# Patient Record
Sex: FEMALE | Race: WHITE | Employment: FULL TIME | URBAN - NONMETROPOLITAN AREA
[De-identification: names, ages, dates, MRNs, and addresses within clinical notes are randomized per-mention and may not be internally consistent; named-entity substitution may affect disease eponyms.]

---

## 2017-04-22 ENCOUNTER — HISTORY (OUTPATIENT)
Dept: EMERGENCY MEDICINE | Facility: OTHER | Age: 47
End: 2017-04-22

## 2017-05-25 ENCOUNTER — OFFICE VISIT - GICH (OUTPATIENT)
Dept: OBGYN | Facility: OTHER | Age: 47
End: 2017-05-25

## 2017-05-25 ENCOUNTER — HISTORY (OUTPATIENT)
Dept: OBGYN | Facility: OTHER | Age: 47
End: 2017-05-25

## 2017-05-25 DIAGNOSIS — R10.2 PELVIC AND PERINEAL PAIN: ICD-10-CM

## 2017-05-25 DIAGNOSIS — N92.0 EXCESSIVE AND FREQUENT MENSTRUATION WITH REGULAR CYCLE: ICD-10-CM

## 2017-05-25 LAB
PROLACTIN - HISTORICAL: 12.19 NG/ML
T3,FREE - HISTORICAL: 3.37 PG/ML (ref 2.5–3.9)
T4 FREE SERPL-MCNC: 1.04 NG/DL (ref 0.58–1.64)
TSH - HISTORICAL: 1.56 UIU/ML (ref 0.34–5.6)

## 2017-06-13 ENCOUNTER — AMBULATORY - GICH (OUTPATIENT)
Dept: FAMILY MEDICINE | Facility: OTHER | Age: 47
End: 2017-06-13

## 2017-06-13 ENCOUNTER — HISTORY (OUTPATIENT)
Dept: FAMILY MEDICINE | Facility: OTHER | Age: 47
End: 2017-06-13

## 2017-06-13 DIAGNOSIS — Z23 ENCOUNTER FOR IMMUNIZATION: ICD-10-CM

## 2017-06-13 LAB
ABSOLUTE BASOPHILS - HISTORICAL: 0 THOU/CU MM
ABSOLUTE EOSINOPHILS - HISTORICAL: 0.2 THOU/CU MM
ABSOLUTE LYMPHOCYTES - HISTORICAL: 1.8 THOU/CU MM (ref 0.9–2.9)
ABSOLUTE MONOCYTES - HISTORICAL: 0.5 THOU/CU MM
ABSOLUTE NEUTROPHILS - HISTORICAL: 4 THOU/CU MM (ref 1.7–7)
BASOPHILS # BLD AUTO: 0.6 %
EOSINOPHIL NFR BLD AUTO: 2.6 %
ERYTHROCYTE [DISTWIDTH] IN BLOOD BY AUTOMATED COUNT: 13.7 % (ref 11.5–15.5)
HCT VFR BLD AUTO: 39.3 % (ref 33–51)
HEMOGLOBIN: 12.9 G/DL (ref 12–16)
LYMPHOCYTES NFR BLD AUTO: 27.7 % (ref 20–44)
MCH RBC QN AUTO: 30 PG (ref 26–34)
MCHC RBC AUTO-ENTMCNC: 32.8 G/DL (ref 32–36)
MCV RBC AUTO: 91 FL (ref 80–100)
MONOCYTES NFR BLD AUTO: 8.1 %
NEUTROPHILS NFR BLD AUTO: 60.8 % (ref 42–72)
PLATELET # BLD AUTO: 189 THOU/CU MM (ref 140–440)
PMV BLD: 11.2 FL (ref 6.5–11)
RED BLOOD COUNT - HISTORICAL: 4.3 MIL/CU MM (ref 4–5.2)
WHITE BLOOD COUNT - HISTORICAL: 6.6 THOU/CU MM (ref 4.5–11)

## 2017-06-14 ENCOUNTER — COMMUNICATION - GICH (OUTPATIENT)
Dept: OBGYN | Facility: OTHER | Age: 47
End: 2017-06-14

## 2017-06-26 ENCOUNTER — COMMUNICATION - GICH (OUTPATIENT)
Dept: OBGYN | Facility: OTHER | Age: 47
End: 2017-06-26

## 2017-06-28 ENCOUNTER — SURGERY (OUTPATIENT)
Dept: SURGERY | Facility: OTHER | Age: 47
End: 2017-06-28

## 2017-06-28 ENCOUNTER — HOSPITAL ENCOUNTER (OUTPATIENT)
Dept: MEDSURG UNIT | Facility: OTHER | Age: 47
Discharge: HOME | End: 2017-06-29
Attending: OBSTETRICS & GYNECOLOGY | Admitting: OBSTETRICS & GYNECOLOGY

## 2017-06-28 ENCOUNTER — HISTORY (OUTPATIENT)
Dept: SURGERY | Facility: OTHER | Age: 47
End: 2017-06-28

## 2017-06-28 DIAGNOSIS — N80.9 ENDOMETRIOSIS: ICD-10-CM

## 2017-06-28 DIAGNOSIS — N92.0 EXCESSIVE AND FREQUENT MENSTRUATION WITH REGULAR CYCLE: ICD-10-CM

## 2017-06-28 LAB
ABORH - HISTORICAL: NORMAL
ANTIBODY SCREEN - HISTORICAL: NEGATIVE
HCG UR QL: NEGATIVE
HEMOGLOBIN: 13.3 G/DL (ref 12–16)
MCV RBC AUTO: 91 FL (ref 80–100)
SPECIMEN EXPIRATION DATE/TIME - HISTORICAL: NORMAL

## 2017-06-29 LAB
CREAT SERPL-MCNC: 0.75 MG/DL (ref 0.7–1.3)
GFR IF NOT AFRICAN AMERICAN - HISTORICAL: >60 ML/MIN/1.73M2
HEMOGLOBIN: 11.6 G/DL (ref 12–16)
MCV RBC AUTO: 89 FL (ref 80–100)

## 2017-06-30 ENCOUNTER — COMMUNICATION - GICH (OUTPATIENT)
Dept: OBGYN | Facility: OTHER | Age: 47
End: 2017-06-30

## 2017-07-05 ENCOUNTER — COMMUNICATION - GICH (OUTPATIENT)
Dept: OBGYN | Facility: OTHER | Age: 47
End: 2017-07-05

## 2017-07-07 ENCOUNTER — COMMUNICATION - GICH (OUTPATIENT)
Dept: OBGYN | Facility: OTHER | Age: 47
End: 2017-07-07

## 2017-07-07 ENCOUNTER — AMBULATORY - GICH (OUTPATIENT)
Dept: FAMILY MEDICINE | Facility: OTHER | Age: 47
End: 2017-07-07

## 2017-07-07 ENCOUNTER — HISTORY (OUTPATIENT)
Dept: EMERGENCY MEDICINE | Facility: OTHER | Age: 47
End: 2017-07-07

## 2017-07-07 DIAGNOSIS — K52.9 NONINFECTIVE GASTROENTERITIS AND COLITIS: ICD-10-CM

## 2017-07-11 ENCOUNTER — HISTORY (OUTPATIENT)
Dept: RADIOLOGY | Facility: OTHER | Age: 47
End: 2017-07-11

## 2017-07-11 ENCOUNTER — HISTORY (OUTPATIENT)
Dept: OBGYN | Facility: OTHER | Age: 47
End: 2017-07-11

## 2017-07-11 ENCOUNTER — OFFICE VISIT - GICH (OUTPATIENT)
Dept: OBGYN | Facility: OTHER | Age: 47
End: 2017-07-11

## 2017-07-11 ENCOUNTER — HOSPITAL ENCOUNTER (OUTPATIENT)
Dept: RADIOLOGY | Facility: OTHER | Age: 47
End: 2017-07-11
Attending: FAMILY MEDICINE

## 2017-07-11 DIAGNOSIS — A09 INFECTIOUS GASTROENTERITIS AND COLITIS: ICD-10-CM

## 2017-07-11 DIAGNOSIS — K52.9 NONINFECTIVE GASTROENTERITIS AND COLITIS: ICD-10-CM

## 2017-07-11 DIAGNOSIS — Z23 ENCOUNTER FOR IMMUNIZATION: ICD-10-CM

## 2017-07-11 LAB
ABSOLUTE BASOPHILS - HISTORICAL: 0.1 THOU/CU MM
ABSOLUTE EOSINOPHILS - HISTORICAL: 0.5 THOU/CU MM
ABSOLUTE IMMATURE GRANULOCYTES(METAS,MYELOS,PROS) - HISTORICAL: 0 THOU/CU MM
ABSOLUTE LYMPHOCYTES - HISTORICAL: 1.9 THOU/CU MM (ref 0.9–2.9)
ABSOLUTE MONOCYTES - HISTORICAL: 0.5 THOU/CU MM
ABSOLUTE NEUTROPHILS - HISTORICAL: 4.3 THOU/CU MM (ref 1.7–7)
BASOPHILS # BLD AUTO: 1.2 %
CAMPYLOBACTER EIA - HISTORICAL: NEGATIVE
EOSINOPHIL NFR BLD AUTO: 6.7 %
ERYTHROCYTE [DISTWIDTH] IN BLOOD BY AUTOMATED COUNT: 13.8 % (ref 11.5–15.5)
HCT VFR BLD AUTO: 38.9 % (ref 33–51)
HEMOGLOBIN: 13.1 G/DL (ref 12–16)
IMMATURE GRANULOCYTES(METAS,MYELOS,PROS) - HISTORICAL: 0.1 %
LYMPHOCYTES NFR BLD AUTO: 26.4 % (ref 20–44)
MCH RBC QN AUTO: 30 PG (ref 26–34)
MCHC RBC AUTO-ENTMCNC: 33.7 G/DL (ref 32–36)
MCV RBC AUTO: 89 FL (ref 80–100)
MONOCYTES NFR BLD AUTO: 6.8 %
NEUTROPHILS NFR BLD AUTO: 58.8 % (ref 42–72)
PLATELET # BLD AUTO: 266 THOU/CU MM (ref 140–440)
PMV BLD: 10.4 FL (ref 6.5–11)
RED BLOOD COUNT - HISTORICAL: 4.37 MIL/CU MM (ref 4–5.2)
SHIGA TOXIN 1 - HISTORICAL: NEGATIVE
SHIGA TOXIN 2 - HISTORICAL: NEGATIVE
WHITE BLOOD COUNT - HISTORICAL: 7.3 THOU/CU MM (ref 4.5–11)

## 2017-07-13 LAB
CULTURE - HISTORICAL: NORMAL
METHOD O&P - HISTORICAL: NORMAL
OVA/PARASITE EXAM - HISTORICAL: NORMAL

## 2017-07-18 ENCOUNTER — OFFICE VISIT - GICH (OUTPATIENT)
Dept: OBGYN | Facility: OTHER | Age: 47
End: 2017-07-18

## 2017-07-18 DIAGNOSIS — Z09 ENCOUNTER FOR FOLLOW-UP EXAMINATION AFTER COMPLETED TREATMENT FOR CONDITIONS OTHER THAN MALIGNANT NEOPLASM: ICD-10-CM

## 2017-08-08 ENCOUNTER — OFFICE VISIT - GICH (OUTPATIENT)
Dept: OBGYN | Facility: OTHER | Age: 47
End: 2017-08-08

## 2017-08-08 ENCOUNTER — HISTORY (OUTPATIENT)
Dept: OBGYN | Facility: OTHER | Age: 47
End: 2017-08-08

## 2017-08-08 DIAGNOSIS — Z09 ENCOUNTER FOR FOLLOW-UP EXAMINATION AFTER COMPLETED TREATMENT FOR CONDITIONS OTHER THAN MALIGNANT NEOPLASM: ICD-10-CM

## 2017-09-18 ENCOUNTER — COMMUNICATION - GICH (OUTPATIENT)
Dept: OBGYN | Facility: OTHER | Age: 47
End: 2017-09-18

## 2017-12-27 NOTE — PROGRESS NOTES
Patient Information     Patient Name MRN Kerrie Barrett [id number] Female 1970      Progress Notes by Mickie Martinez RT at 2017  7:44 AM     Author:  Mickie Martinez RT Service:  (none) Author Type:  RT- Respiratory Therapist     Filed:  2017  7:44 AM Date of Service:  2017  7:44 AM Status:  Signed     :  Mickie Martinez RT (RT- Respiratory Therapist)            Kerrie Patel WAS INSTRUCTED ON THE USE OF IS TODAY.  PATIENT ACHIEVED 1250 MLS OUT OF THE PREDICTED 1750 MLS BASED ON AGE AND HEIGHT.  PATIENT WILL USE IS, 10 BREATHS EVERY HOUR WHILE AWAKE FOLLOWED BY A STRONG COUGH TO KEEP LUNGS OPEN AND CLEAR WHILE HERE IN THE HOSPITAL.  PATIENT WAS ALSO INSTRUCTED TO SPLINT THEIR INCISION IF INDICATED.  PATIENT WILL CONTINUE TO USE IS UNTIL ABLE TO RESUME NORMAL DAILY ACTIVITIES.

## 2017-12-27 NOTE — PROGRESS NOTES
Patient Information     Patient Name MRN Sex Kerrie Sanderson [id number] Female 1970      Progress Notes by Loretta Childs RN at 2017 10:00 AM     Author:  Loretta Childs RN Service:  (none) Author Type:  NURS- Registered Nurse     Filed:  2017 10:01 AM Date of Service:  2017 10:00 AM Status:  Signed     :  Loretta Childs RN (NURS- Registered Nurse)            Problem: PAIN  Goal: VERBALIZES/DISPLAYS ADEQUATE COMFORT LEVEL OR BASELINE COMFORT LEVEL  Pain is well controlled with PRN medications.     Problem: INTEGUMENTARY  Goal: INCISION(S), WOUNDS(S) OR DRAIN SITE(S) HEALING WITHOUT S/S OF INFECTION  2 abdominal incisions to abdomen have steri strips without drainage. Scant dark bloody drainage noted in carlos enrique pad.     Problem: URINARY  Goal: MAINTAIN OR PROMOTE RETURN TO OPTIMAL URINARY FUNCTION  Garcia removed at 0530. Passing urine without issues.      Loretta Childs RN ....................  2017   10:00 AM

## 2017-12-27 NOTE — PROGRESS NOTES
"Patient Information     Patient Name MRN Kerrie Barrett [id number] Female 1970      Progress Notes by Gael Hassan MD at 2017  7:25 AM     Author:  Gael Hassan MD Service:  (none) Author Type:  Physician     Filed:  2017  7:26 AM Date of Service:  2017  7:25 AM Status:  Signed     :  Gael Hassan MD (Physician)            PROGRESS NOTE    SUBJECTIVE:  Doing well, no flatus yet, eating, voiding, pain OK.    OBJECTIVE:  /57  Pulse 87  Temp 99  F (37.2  C)  Resp 16  Ht 1.549 m (5' 1\")  Wt 73.8 kg (162 lb 11.2 oz)  LMP 06/10/2017  SpO2 97%  Breastfeeding? No  BMI 30.74 kg/m2    Temp (24hrs), Av.6  F (37  C), Min:96.6  F (35.9  C), Max:99.5  F (37.5  C)      Patient Vitals for the past 72 hrs:   Weight   17 1100 73.8 kg (162 lb 11.2 oz)     Intake/Output Summary (Last 24 hours) at 17 0725  Last data filed at 17 0644   Gross per 24 hour   Intake             3552 ml   Output             2375 ml   Net             1177 ml       PHYSICAL EXAM:  ABdomen: moderately distended, tympanitic, incisions dressed and dry.  Recent Labs       17   0424   CREATININE  0.75     Recent Labs         17   0424  17   1145  17   1323   WBC   --    --   6.6   HGB  11.6 L  13.3  12.9   PLT   --    --   189     Active Problems:    Menorrhagia with regular cycle    Intramural leiomyoma of uterus    Pelvic pain    Endometriosis determined by laparoscopy    ASSESSMENT & PLAN: Doing very well, home later today.    Gael Hassan MD FACOG  7:26 AM 2017           "

## 2017-12-28 NOTE — PROGRESS NOTES
Patient Information     Patient Name MRN Sex Kerrie Sanderson [id number] Female 1970      Progress Notes by Hope Santos MD at 2017 12:57 PM     Author:  Hope Santos MD Service:  (none) Author Type:  Physician     Filed:  2017  1:44 PM Encounter Date:  2017 Status:  Signed     :  Hope Santos MD (Physician)              PREOPERATIVE CLEARANCE  Date of Exam: 2017    Nursing Notes:   Zaynab Ventura  2017 12:56 PM  Signed  This patient presents today for a Preoperative exam for this procedure: Laparoscopic Hysterectomy   Date of Surgery: 2017   Surgeon:  Dr. Hassan  Facility:  [facility] LPN............................ 2017 12:49 PM           HPI:  Kerrie Patel is a 47 y.o. Female with ongoing menorrhagia and pelvic pain with menses and even between presents for preoperative clearance for above procedure. She has no chronic health issues and is otherwise healthy.  Recent chart notes and procedures as well as US noted. Pap normal and EMB benign.    Currently works out at [employer] and has lost about 30# in the past year.     Problem List:   Patient Active Problem List     Diagnosis  Code     FH DIABETES Z83.3     FIBROCYSTIC BREAST DISEASE N60.19      Histories:  Past Medical History:     Diagnosis  Date     Back pain, thoracic      FH: diabetes mellitus      Fibrocystic breast disease      GERD (gastroesophageal reflux disease)       Past Surgical History:      Procedure  Laterality Date      SECTION  96, 99     CHOLECYSTECTOMY       PREMALIG/BENIGN SKIN LESION EXCISION  2012    BREAST LESION       Social History     Social History        Marital status:       Spouse name: N/A     Number of children:  2     Years of education:  N/A     Occupational History      Not on file.     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    Spouse, Ruperto, teaches people how to fight fires and has even done this internationally.    .    Has 2 sons, Chuckie, 1996, Jordy, 1999.              Obstetric History       T2      L2     SAB0   TAB0   Ectopic0   Multiple0   Live Births2      Family History       Problem   Relation Age of Onset     Diabetes  Father      Type 2        Allergies: Amoxicillin and Lactose   Latex Allergy: no    Current Medications:    Medications have been reviewed by me and are current to the best of my knowledge and ability.    Recent use of: no recent use of aspirin (ASA), NSAIDS or steroids    HABITS:   Social History     Substance Use Topics       Smoking status: Never Smoker     Smokeless tobacco: Never Used     Alcohol use No   Tetanus up to date: Done today.     Proposed anesthesia: General  Anesthesia Complications: None  History of abnormal bleeding : None  History of Blood Transfusions: No    Health Care Directive or Living Will: yes  Preoperative Evaluation: Obstructive Sleep Apnea screening    S: Snore -  Do you snore loudly? (louder than talking or loud enough to be heard through closed doors)(NO)  T: Tired - Do you often feel tired, fatigued, or sleepy during the daytime?(NO)  O: Observed - Has anyone ever observed you stop breathing during your sleep?(NO)  P: Pressure - Do you have or are you being treated for high blood pressure?(NO)  B: BMI - BMI greater than 35kg/m2?(NO)  A: Age - Age over 50 years old?(NO)  N: Neck - Neck  "circumference greater than 40 cm?(NO)  G: Gender - Gender: Male?(NO)    Total number of \"YES\" responses:  1    Scoring: Low risk of AURELIA 0-2  At Risk of AURELIA: >3 High Risk of AURELIA: 5-8    REVIEW OF SYSTEMS:  A comprehensive review of systems was negative except for items noted in HPI/Subjective.    Contraception-vasectomy     EXAM:   /72  Pulse 60  Temp 97.1  F (36.2  C)  Resp 18  Ht 1.549 m (5' 1\")  Wt 73.5 kg (162 lb)  LMP 06/10/2017  SpO2 98%  Breastfeeding? No  BMI 30.61 kg/m2 Body mass index is 30.61 kg/(m^2).  General Appearance: Pleasant, alert, appropriate appearance for age. No acute distress  Head Exam: Normal. Normocephalic, atraumatic.  Ear Exam: Normal TM's bilaterally. Normal auditory canals and external ears. Non-tender.  OroPharynx Exam: Dental hygiene adequate. Normal buccal mucosa. Normal pharynx.  Neck Exam: Supple, no masses or nodes.  Thyroid Exam: No nodules or enlargement.  Chest/Respiratory Exam: Normal chest wall and respirations. Clear to auscultation.  Cardiovascular Exam: Regular rate and rhythm. S1, S2, no murmur, click, gallop, or rubs.  Musculoskeletal Exam: Back is straight and non-tender, full ROM of upper and lower extremities.  Skin: Normal. and no rash or abnormalities  Neurologic Exam: Normal.    DIAGNOSTICS:   1. EKG: EKG FINDINGS - Not indicated  2. CXR: Not indicated  3. Labs:   Results for orders placed or performed in visit on 06/13/17      CBC WITH AUTO DIFFERENTIAL      Result  Value Ref Range    WHITE BLOOD COUNT         6.6 4.5 - 11.0 thou/cu mm    RED BLOOD COUNT           4.30 4.00 - 5.20 mil/cu mm    HEMOGLOBIN                12.9 12.0 - 16.0 g/dL    HEMATOCRIT                39.3 33.0 - 51.0 %    MCV                       91 80 - 100 fL    MCH                       30.0 26.0 - 34.0 pg    MCHC                      32.8 32.0 - 36.0 g/dL    RDW                       13.7 11.5 - 15.5 %    PLATELET COUNT            189 140 - 440 thou/cu mm    MPV               "         11.2 (H) 6.5 - 11.0 fL    NEUTROPHILS               60.8 42.0 - 72.0 %    LYMPHOCYTES               27.7 20.0 - 44.0 %    MONOCYTES                 8.1 <12.0 %    EOSINOPHILS               2.6 <8.0 %    BASOPHILS                 0.6 <3.0 %    ABSOLUTE NEUTROPHILS      4.0 1.7 - 7.0 thou/cu mm    ABSOLUTE LYMPHOCYTES      1.8 0.9 - 2.9 thou/cu mm    ABSOLUTE MONOCYTES        0.5 <0.9 thou/cu mm    ABSOLUTE EOSINOPHILS      0.2 <0.5 thou/cu mm    ABSOLUTE BASOPHILS        0.0 <0.3 thou/cu mm     I have personally reviewed the labs listed above.      4. Pre-op urine for pregnancy (for 12 yrs and older or menstruating): will be done at facility at time of surgery    IMPRESSION:      For above listed surgery and anesthesia:   Patient is low risk for perioperative complications.    RECOMMENDATIONS: proceed without further diagnostic evaluation    Electronically Signed by:    Hope Santos MD  1:32 PM 6/13/2017

## 2017-12-28 NOTE — PROGRESS NOTES
"Patient Information     Patient Name MRKerrie Malloy [id number] Female 1970      Progress Notes by Elfego Krause PharmD at 2017  8:33 AM     Author:  Elfego Krause PharmD Service:  (none) Author Type:  PHARM- Pharmacist     Filed:  2017  8:33 AM Date of Service:  2017  8:33 AM Status:  Signed     :  Elfego Krause PharmD (PHARM- Pharmacist)            Pharmacy: Discharge Counseling and Medication Reconciliation    Kerrie Patel  [address]  [facility] [address]    Home Phone [phone]   Work Phone [phone]     47 y.o. female  PCP:No Pcp    Allergies     Allergen  Reactions     Amoxicillin Nausea And Vomiting     Lactose GI Bleeding       Discharge Counseling:    Pharmacist met with patient (and/or family) today to review the medication portion of the After Visit Summary (with an emphasis on NEW medications) and to address patient's questions/concerns.     Summary of Education: PharmD met with patient to discuss new medications after discharge. Reviewed indication, expected duration, potential side effects and proper use of each.    Materials Provided:   MedCounselor sheets printed from Clinical Pharmacology on: \"Docusate\", \"Oxycodone/APAP\", \"Ibuprofen\", \"Northethindrone\"    Discharge Medication Reconciliation:    Elfego Krause PharmD has reviewed the patient's discharge medication orders and has compared them to the inpatient medication administration record and to what the patient was taking prior to admission- any discrepancies have been resolved.     It has been determined that the patient has an adequate supply of medications available or which can be obtained from the patient's preferred pharmacy.    Thank you for the consult.     Elfego Krause PharmD ....................  2017   8:33 AM          "

## 2017-12-28 NOTE — OR ANESTHESIA
Patient Information     Patient Name MRN Sex Kerrie Sanderson [id number] Female 1970      OR Anesthesia by Justin Herr DO at 2017  1:34 PM     Author:  Justin Herr DO Service:  (none) Author Type:  PHYS- Anesthesiologist     Filed:  2017  1:34 PM Date of Service:  2017  1:34 PM Status:  Signed     :  Justin Herr DO (PHYS- Anesthesiologist)                                                           ANESTHESIA ASSESSMENT    Date: 17 Time: 1:34 PM      Patient:  Kerrie Patel    Procedure(s) (LRB):  LAPAROSCOPIC HYSTERECTOMY (N/A)    Past Medical History:     Diagnosis  Date     Back pain, thoracic      FH: diabetes mellitus      Fibrocystic breast disease      GERD (gastroesophageal reflux disease)        Past Surgical History:      Procedure  Laterality Date      SECTION  96, 99     CHOLECYSTECTOMY       PREMALIG/BENIGN SKIN LESION EXCISION  2012    BREAST LESION         Family History       Problem   Relation Age of Onset     Diabetes  Father      Type 2         Patient Active Problem List     Diagnosis  Code     FH DIABETES Z83.3     FIBROCYSTIC BREAST DISEASE N60.19     Menorrhagia with regular cycle N92.0     Intramural leiomyoma of uterus D25.1     Pelvic pain R10.2       Prescriptions Prior to Admission       Medication  Sig Dispense Refill     ibuprofen (ADVIL; MOTRIN) 200 mg cap Take 200 mg by mouth. Indications: Pain         Allergies:  Allergies     Allergen  Reactions     Amoxicillin Nausea And Vomiting     Lactose GI Bleeding       Review of Systems:  GERD: No  Chest pain: No  Shortness of breath: No  Recent fever: No  Poor exercise tolerance: No  Bleeding tendency: No  Pregnant: No  Anesthesia Complications: None      History    Smoking Status      Never Smoker   Smokeless Tobacco      Never Used     Social History     Social History        Marital status:       Spouse name: N/A     Number of children:  2     Years of education:  N/A  "    Social History Main Topics        Smoking status:  Never Smoker     Smokeless tobacco:  Never Used     Alcohol use  No     Drug use:  No     Sexual activity:  Yes     Partners: Male     Other Topics   Concern      Service  No     Blood Transfusions  No     Caffeine Concern  No      2 cups      Occupational Exposure  No     Hobby Hazards  No     Sleep Concern  No     Stress Concern  No     Weight Concern  Yes     Special Diet  No     Exercise  Yes     Spartan      Seat Belt  Yes     Self-Exams  No     Social History Narrative     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    Spouse, Ruperto, teaches people how to fight fires and has even done this internationally.    .    Has 2 sons, Canaan, 1/25/1996, Jordy, 2/23/1999.               Physical Examination:  /79  Pulse 61  Temp 99  F (37.2  C)  Resp 16  Ht 1.549 m (5' 1\")  Wt 73.8 kg (162 lb 11.2 oz)  LMP 06/10/2017  SpO2 99%  Breastfeeding? No  BMI 30.74 kg/m2 Body mass index is 30.74 kg/(m^2). Body surface area is 1.78 meters squared.  Dental Condition: Good     Mallampati Score (Airway): II  Cardiovascular: Normal  Pulmonary: Normal  Other: N/A    [facility]    Recent Labs        06/28/17   1145  06/28/17   1142   HGB  13.3   --    ABORH  A Rh Positive   --    PREGURINE   --   Negative             Assessment/Plan:  ASA Class: II  Risk of dental injury discussed: Yes  NPO status confirmed: Yes  Anesthetic Plan:  General (TAP Block for POPC)  Risk/Benefit/Alt discussed: Yes  Questions answered: Yes  Emergency Case?: No  Labs/ECG/Radiology Reviewed?: Yes      H&P Reviewed.  Patient Examined.      Provider Electronic Signature:  Justin Herr, DO                "

## 2017-12-28 NOTE — OR ANESTHESIA
Patient Information     Patient Name MRN Sex     Rajendra Starr [id number] Female 1970      OR Anesthesia by Justin Herr DO at 2017  3:54 PM     Author:  Justin Herr DO Service:  (none) Author Type:  PHYS- Anesthesiologist     Filed:  2017  3:56 PM Date of Service:  2017  3:54 PM Status:  Signed     :  Justin Herr DO (PHYS- Anesthesiologist)            Bilateral Transversus abdominus plane (TAP) block for post-op pain management. Ultrasound guidance used and images saved.    Patient: RAJENDRA STARR  Patient : 1970  Date: 2017  Procedure time:  1515 to 1525  Location: Operating Room  Diagnosis: abdominal pain.  Indication: Surgeon requests block for post-op pain management.    The procedure, potential benefits, risks, and alternatives were discussed during the preoperative interview with the patient. The patient voiced understanding of the information and agreed to proceed.    A pause was conducted to verify correct patient ID and surgical site utilizing the written consent form and patient feedback.    Anesthesia: GA  Block was performed pre-op in OR following Induction of GA    The right abdomen in the midaxillary line was prepped with chlorhexidine. A 22G 3.5 inch spinal needle was inserted within the triangle of petit and advanced with ultrasound guidance until the needle tip was visualized between the fascial layers of the internal oblique and transversus abdominus. Injection of a small amount of fluid was utilized to confirm correct placement. 25ml of 0.25% bupivacaine with 2mg of preservative free decadron was injected incrementally, with confirmation of negative aspiration and visualization of hydro-dissection of local anesthetic between the muscle layers.     The left abdomen in the midaxillary line was prepped with chlorhexidine. A 22G 3.5 inch spinal needle was inserted within the triangle of petit and advanced with ultrasound guidance until the  needle tip was visualized between the fascial layers of the internal oblique and transversus abdominus. Injection of a small amount of fluid was utilized to confirm correct placement. 25ml of 0.25% bupivacaine with 2mg of preservative free decadron was injected incrementally, with confirmation of negative aspiration and visualization of hydro-dissection of local anesthetic between the muscle layers.     An image was saved to the ultrasound machine and prints were made for the chart.    Electronically signed by  Justin Herr DO   6/28/2017  3:54 PM

## 2017-12-28 NOTE — PROGRESS NOTES
Patient Information     Patient Name MRN Kerrie Barrett [id number] Female 1970      Progress Notes by Dana Miranda RN at 2017  6:03 AM     Author:  Dana Miranda RN Service:  (none) Author Type:  NURS- Registered Nurse     Filed:  2017  6:04 AM Date of Service:  2017  6:03 AM Status:  Signed     :  Dana Miranda RN (NURS- Registered Nurse)            Patient tolerating oral liquids and pain meds. Patient's IV saline locked. Garcia cath removed. Patient independent in room. Drsgs (3) on abd dry and intact. Minimal vaginal blood on carlos enrique-pad. Dana Miranda RN ....................  2017   6:04 AM

## 2017-12-28 NOTE — OR ANESTHESIA
Patient Information     Patient Name MRN Sex Kerrie Daley [id number] Female 1970      OR Anesthesia by Kofi Herrera CRNA at 2017  6:48 PM     Author:  Kofi Herrera CRNA Service:  (none) Author Type:  NURS- Nurse Anesthetist     Filed:  2017  6:49 PM Date of Service:  2017  6:48 PM Status:  Signed     :  Kofi Herrera CRNA (NURS- Nurse Anesthetist)            Anesthesia Post Operative Care Note    Name: Kerrie Patel  MRN:   [id number]  :    1970       Procedure Done:  See Surgeon Note   Case Cancelled for Anesthetic Reason:  No      Anesthesia Technique    Anesthetic Type:  General       Airway Management:  ET Tube         Intubation:  Easy     Oral Trauma:  No    Intraoperative Course   Hemodynamics:  Stable    Ventilation Normal:  Yes Lung Sounds:  Normal      PACU Course    Airway Status:  Extubated     Nondepolarizer Used: Yes        Reversed: Yes    Reintubation:  No   Hemodynamics:  Stable      Hydration: Euvolemic   Temperature:  36.1 - 38.3      Mental Status:  Awake, alert, follows commands   Pain Management:  Adequate     Regional Block:  Yes     Regional Block Outcome:  Adequate   Anesthesia Complications:  None      Vital Signs:  Temp: 97.7  F (36.5  C)  Pulse: 61  BP: 109/56  Resp: 16  SpO2: 94 %    O2 Device: Room Air    NON-VERBAL PAIN SCALE  Face: No particular expression or smile  Activity (Movement): Lying quietly, normal position  Guarding: Lying quietly  Physiologic I (Vital Signs): Stable vital signs/no change 4 hrs  Physiologic II: Warm, dry, skin  Total Score: 0    Level of Nausea: None        Active Lines:  Patient Lines/Drains/Airways Status    Active Line     Name: Placement date: Placement time: Site: Days:    PERIPHERAL VAD Right Hand 20 17   1230   Hand   less than 1                Intake & Output:  Date  17 1500 - 17 0659(Not Admitted)   17 0700 - 17 0659      Shift  0333-5252 4509-2579 24  Hour Total 5891-5368 6258-5486 9061-1333 24 Hour Total   I  N  T  A  K  E   Intravenous     2000 2000       +I/O+  Maint IV (lactated Ringers infusion)     2000 2000    Shift Total     2000 2000   O  U  T  P  U  T   Shift Total          NET      2000 2000   Weight (kg)     73.8 73.8 73.8 73.8         Labs:  No results for input(s): RF6KLPGNAIO, EFF3TTHWVXYL, PHARTERIAL, VNV4ACVKINTA, V7HBGNZQMKCW in the last 24 hours.    No results for input(s): MAGNESIUM in the last 24 hours.    No results for input(s): GLUCOSEMETER in the last 720 hours.        Kofi Herrera CRNA ....................  6/28/2017   6:48 PM

## 2017-12-28 NOTE — PROGRESS NOTES
Patient Information     Patient Name MRN Sex Kerrie Sanderson [id number] Female 1970      Progress Notes by Dana Miranda RN at 2017  7:19 PM     Author:  Dana Miranda RN Service:  (none) Author Type:  NURS- Registered Nurse     Filed:  2017  7:20 PM Date of Service:  2017  7:19 PM Status:  Signed     :  Dana Miranda RN (NURS- Registered Nurse)            Admission Note    Data:  Kerrie Patel admitted to [facility] from PACU via bed.      Action:  Family and Dr. Hassan have been notified of admission.      Response:  Patient tolerated transfer.      Dana Miranda RN ....................  2017   7:20 PM

## 2017-12-28 NOTE — OR NURSING
Patient Information     Patient Name MRN Sex Kerrie Sanderson [id number] Female 1970      OR Nursing by Denise Farmer RN at 2017  3:53 PM     Author:  Denise Farmer RN Service:  (none) Author Type:  NURS- Registered Nurse     Filed:  2017  3:53 PM Date of Service:  2017  3:53 PM Status:  Signed     :  Denise Farmer RN (NURS- Registered Nurse)            Hands off report received from Mary Cabrera RN before transfer to Operating Room.

## 2017-12-28 NOTE — TELEPHONE ENCOUNTER
"Patient Information     Patient Name MRN Kerrie Barrett [id number] Female 1970      Telephone Encounter by Heidi Bueno RN at 2017  2:25 PM     Author:  Heidi Bueno RN Service:  (none) Author Type:  NURS- Registered Nurse     Filed:  2017  2:28 PM Encounter Date:  2017 Status:  Signed     :  Heidi Bueno RN (NURS- Registered Nurse)            Patient states that she is having \"minimal\" symptoms of hot flashes but these are must worse at night. Patient states she will start her Norethindrone which she has not taken at all and will come in to be seen if this does not suppress the symptoms. Patient will try #90 of the Norethindrone and request a refill if this works well for her otherwise will present to be seen.  Heidi Bueno RN............. 2017 2:28 PM         "

## 2017-12-28 NOTE — TELEPHONE ENCOUNTER
Patient Information     Patient Name MRN Sex Kerrie Sanderson [id number] Female 1970      Telephone Encounter by Johanna Vela at 2017  4:01 PM     Author:  Johanna Vela Service:  (none) Author Type:  NURS- Registered Nurse     Filed:  2017  4:19 PM Encounter Date:  2017 Status:  Signed     :  Johanna Vela (NURS- Registered Nurse)            Patient had a LAPAROSCOPIC HYSTERECTOMY on 2017 is having some discomfort in shoulder and arm right side this could be from the gas from surgery.  Also having some swelling in right side of neck patient will go to ED to have neck check.  Patient understands this.  Johanna Vela RN .............. 2017  4:19 PM

## 2017-12-28 NOTE — TELEPHONE ENCOUNTER
Patient Information     Patient Name MRN Kerrie Barrett [id number] Female 1970      Telephone Encounter by Heidi Bueno RN at 2017  1:12 PM     Author:  Heidi Bueno RN Service:  (none) Author Type:  NURS- Registered Nurse     Filed:  2017  1:17 PM Encounter Date:  2017 Status:  Signed     :  Heidi Bueno RN (NURS- Registered Nurse)            Patient was calling as she could not remember what type of anesthesia was discussed, spinal vs general or if she would have a choice. Patient informed that per scheduling form she would have a choice between both general and spinal during the procedure. Patient informed of this and was happy. Will prepare questions for anesthesia the day of surgery regarding anesthesia type.  Heidi Bueno RN............. 2017 1:17 PM

## 2017-12-28 NOTE — INTERVAL H&P NOTE
Patient Information     Patient Name MRN Kerrie Barrett [id number] Female 1970      Interval H&P Note by Gael Hassan MD at 2017  1:16 PM     Author:  Gael Hassan MD Service:  (none) Author Type:  Physician     Filed:  2017  1:17 PM Date of Service:  2017  1:16 PM Status:  Signed     :  Gael Hassan MD (Physician)            History and Physical Update    The history and physical has been reviewed and the patient has been examined.  There are no interim changes to the patient's history or physical condition.    Gael Hassan MD          Source Note     Author:  Hope Santos MD Service:  (none) Author Type:  Physician    Filed:  2017  1:44 PM Date of Service:  2017 12:45 PM Status:  Signed    :  Hope Santos MD (Physician)                PREOPERATIVE CLEARANCE  Date of Exam: 2017    Nursing Notes:   Zaynab Ventura  2017 12:56 PM  Signed  This patient presents today for a Preoperative exam for this procedure: Laparoscopic Hysterectomy   Date of Surgery: 2017   Surgeon:  Dr. Hassan  Facility:  [facility]............................ 2017 12:49 PM           HPI:  Kerrie Patel is a 47 y.o. Female with ongoing menorrhagia and pelvic pain with menses and even between presents for preoperative clearance for above procedure. She has no chronic health issues and is otherwise healthy.  Recent chart notes and procedures as well as US noted. Pap normal and EMB benign.    Currently works out at [employer] and has lost about 30# in the past year.     Problem List:   Patient Active Problem List     Diagnosis  Code     FH DIABETES Z83.3     FIBROCYSTIC BREAST DISEASE N60.19      Histories:  Past Medical History:     Diagnosis  Date     Back pain, thoracic      FH: diabetes mellitus      Fibrocystic breast disease      GERD (gastroesophageal reflux disease)       Past Surgical History:      Procedure  Laterality Date       SECTION  96, 99     CHOLECYSTECTOMY  2003     PREMALIG/BENIGN SKIN LESION EXCISION  2012    BREAST LESION       Social History     Social History        Marital status:       Spouse name: N/A     Number of children:  2     Years of education:  N/A     Occupational History      Not on file.     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    Spouse, Ruperto, teaches people how to fight fires and has even done this internationally.    .    Has 2 sons, Chuckie, 1996, Jordy, 1999.              Obstetric History       T2      L2     SAB0   TAB0   Ectopic0   Multiple0   Live Births2      Family History       Problem   Relation Age of Onset     Diabetes  Father      Type 2        Allergies: Amoxicillin and Lactose   Latex Allergy: no    Current Medications:    Medications have been reviewed by me and are current to the best of my knowledge and ability.    Recent use of: no recent use of aspirin (ASA), NSAIDS or steroids    HABITS:   Social History     Substance Use Topics       Smoking status: Never Smoker     Smokeless tobacco: Never Used     Alcohol use No   Tetanus up to date: Done today.     Proposed anesthesia: General  Anesthesia Complications: None  History of abnormal bleeding : None  History of Blood Transfusions: No    Health Care Directive or Living Will: yes  Preoperative Evaluation: Obstructive Sleep Apnea  "screening    S: Snore -  Do you snore loudly? (louder than talking or loud enough to be heard through closed doors)(NO)  T: Tired - Do you often feel tired, fatigued, or sleepy during the daytime?(NO)  O: Observed - Has anyone ever observed you stop breathing during your sleep?(NO)  P: Pressure - Do you have or are you being treated for high blood pressure?(NO)  B: BMI - BMI greater than 35kg/m2?(NO)  A: Age - Age over 50 years old?(NO)  N: Neck - Neck circumference greater than 40 cm?(NO)  G: Gender - Gender: Male?(NO)    Total number of \"YES\" responses:  1    Scoring: Low risk of AURELIA 0-2  At Risk of AURELIA: >3 High Risk of AURELIA: 5-8    REVIEW OF SYSTEMS:  A comprehensive review of systems was negative except for items noted in HPI/Subjective.    Contraception-vasectomy     EXAM:   /72  Pulse 60  Temp 97.1  F (36.2  C)  Resp 18  Ht 1.549 m (5' 1\")  Wt 73.5 kg (162 lb)  LMP 06/10/2017  SpO2 98%  Breastfeeding? No  BMI 30.61 kg/m2 Body mass index is 30.61 kg/(m^2).  General Appearance: Pleasant, alert, appropriate appearance for age. No acute distress  Head Exam: Normal. Normocephalic, atraumatic.  Ear Exam: Normal TM's bilaterally. Normal auditory canals and external ears. Non-tender.  OroPharynx Exam: Dental hygiene adequate. Normal buccal mucosa. Normal pharynx.  Neck Exam: Supple, no masses or nodes.  Thyroid Exam: No nodules or enlargement.  Chest/Respiratory Exam: Normal chest wall and respirations. Clear to auscultation.  Cardiovascular Exam: Regular rate and rhythm. S1, S2, no murmur, click, gallop, or rubs.  Musculoskeletal Exam: Back is straight and non-tender, full ROM of upper and lower extremities.  Skin: Normal. and no rash or abnormalities  Neurologic Exam: Normal.    DIAGNOSTICS:   1. EKG: EKG FINDINGS - Not indicated  2. CXR: Not indicated  3. Labs:   Results for orders placed or performed in visit on 06/13/17      CBC WITH AUTO DIFFERENTIAL      Result  Value Ref Range    WHITE BLOOD " COUNT         6.6 4.5 - 11.0 thou/cu mm    RED BLOOD COUNT           4.30 4.00 - 5.20 mil/cu mm    HEMOGLOBIN                12.9 12.0 - 16.0 g/dL    HEMATOCRIT                39.3 33.0 - 51.0 %    MCV                       91 80 - 100 fL    MCH                       30.0 26.0 - 34.0 pg    MCHC                      32.8 32.0 - 36.0 g/dL    RDW                       13.7 11.5 - 15.5 %    PLATELET COUNT            189 140 - 440 thou/cu mm    MPV                       11.2 (H) 6.5 - 11.0 fL    NEUTROPHILS               60.8 42.0 - 72.0 %    LYMPHOCYTES               27.7 20.0 - 44.0 %    MONOCYTES                 8.1 <12.0 %    EOSINOPHILS               2.6 <8.0 %    BASOPHILS                 0.6 <3.0 %    ABSOLUTE NEUTROPHILS      4.0 1.7 - 7.0 thou/cu mm    ABSOLUTE LYMPHOCYTES      1.8 0.9 - 2.9 thou/cu mm    ABSOLUTE MONOCYTES        0.5 <0.9 thou/cu mm    ABSOLUTE EOSINOPHILS      0.2 <0.5 thou/cu mm    ABSOLUTE BASOPHILS        0.0 <0.3 thou/cu mm     I have personally reviewed the labs listed above.      4. Pre-op urine for pregnancy (for 12 yrs and older or menstruating): will be done at facility at time of surgery    IMPRESSION:      For above listed surgery and anesthesia:   Patient is low risk for perioperative complications.    RECOMMENDATIONS: proceed without further diagnostic evaluation    Electronically Signed by:    Hope Santos MD  1:32 PM 6/13/2017

## 2017-12-28 NOTE — PROGRESS NOTES
"Patient Information     Patient Name MRN Sex Kerrie Sanderson [id number] Female 1970      Progress Notes by Gael Hassan MD at 2017 10:00 AM     Author:  Gael Hassan MD Service:  (none) Author Type:  Physician     Filed:  2017 12:44 PM Encounter Date:  2017 Status:  Signed     :  Gael Hassan MD (Physician)            Kerrie Patel presents today for a postop checkup at 6 weeks after total lap hysterectomy and BSO    S: Bowels and bladder are functioning normally, no abnormal bleeding or pain. No concerns about the incision(s). Some residual burning and numbness over her left hip. Very tolerable at this point.    Current Outpatient Prescriptions on File Prior to Visit       Medication  Sig Dispense Refill     norethindrone (NORLUTATE) 5 mg tablet Take 1 tablet by mouth once daily. 90 tablet 0     No current facility-administered medications on file prior to visit.      Allergies     Allergen  Reactions     Amoxicillin Nausea And Vomiting     Lactose GI Bleeding     Past Medical History:     Diagnosis  Date     Back pain, thoracic      FH: diabetes mellitus      Fibrocystic breast disease      GERD (gastroesophageal reflux disease)      Past Surgical History:      Procedure  Laterality Date      SECTION  96, 99     CHOLECYSTECTOMY  2003     [place] TLH W TUBES/OVAR 250 G OR LESS  2017            PREMALIG/BENIGN SKIN LESION EXCISION  2012    BREAST LESION         COMPLETE REVIEW OF SYSTEMS: see HPI        O: /70  Pulse 60  Temp 98.5  F (36.9  C) (Tympanic)   Ht 1.549 m (5' 1\")  Wt 73.8 kg (162 lb 12.8 oz)  LMP 06/10/2017 (Exact Date)  Breastfeeding? No  BMI 30.76 kg/m2 Body mass index is 30.76 kg/(m^2).    EXAM:  General Appearance: Pleasant, alert, appropriate appearance for age. No acute distress  Vaginal cuff well supported. Some v-lock suture still present at the apex.      I/P:  Stable postop    Continue oral estrogen replacement, rtc prn.  No longer " requires screening pelvic exams or pap smears.    Based on what occurred in the visit today:  Previous medication(s) were discontinued or altered? No  Previous medication(s) were suspended pending consultation? No  New medication(s) started? No        Gael Hassan MD FACOG  12:42 PM 8/8/2017

## 2017-12-28 NOTE — TELEPHONE ENCOUNTER
Patient Information     Patient Name MRN Kerrie Barrett [id number] Female 1970      Telephone Encounter by Heidi Bueno RN at 2017  1:55 PM     Author:  Heidi Bueno RN Service:  (none) Author Type:  NURS- Registered Nurse     Filed:  2017  1:57 PM Encounter Date:  2017 Status:  Signed     :  Heidi Bueno RN (NURS- Registered Nurse)            Per [facility] - patient has only been refilling her ([facility]) Docusate 100 mg since her surgery, also was prescribed Norethindrone 5 mg. Patient was given #90 of these however has not been taking them per patient. Await return call from patient to discuss medication and if she is currently symptomatic.  Heidi Bueno RN............. 2017 1:57 PM

## 2017-12-28 NOTE — TELEPHONE ENCOUNTER
Patient Information     Patient Name MRN Sex Kerrie Sanderson [id number] Female 1970      Telephone Encounter by Sue Barton at 2017  3:48 PM     Author:  Sue Barton Service:  (none) Author Type:  (none)     Filed:  2017  3:50 PM Encounter Date:  2017 Status:  Signed     :  Sue Barton called and is wondering if she needs to be seen and have her incisions checked.  Please give her a call.  Sue Barton ....................  2017   3:49 PM

## 2017-12-28 NOTE — PROCEDURES
Patient Information     Patient Name MRN Sex Kerrie Sanderson [id number] Female 1970      Procedures by Gael Hassan MD at 2017  5:39 PM     Author:  Gael Hassan MD Service:  (none) Author Type:  Physician     Filed:  2017  5:47 PM Date of Service:  2017  5:39 PM Status:  Signed     :  Gael Hassan MD (Physician)        Pre-procedure Diagnoses:    1. Menorrhagia with regular cycle [N92.0]    2. Intramural leiomyoma of uterus [D25.1]           Post-procedure Diagnoses:    1. Endometriosis determined by laparoscopy [N80.9]    2. Intramural leiomyoma of uterus [D25.1]           Procedures:    1. [place] TLH W TUBES/OVAR 250 G OR LESS [99384.0]               Gynecological Procedure Note  Preoperative Diagnosis:  Menorrhagia, fibroid uterus  Postoperative Diagnosis:  Same, endometriosis  Procedure:  Total laparoscopic hysterectomy and bilateral salpingo-oophorectomy  Surgeon:  Sonya  Anesthesia:  General Block  Findings:  Endometriosis causing adhesions of left tube and ovary to sigmoid colon and left pelvic sidewall.    Description of Operative Procedure:  After consent was obtained for total laparoscopic hysterectomy and proceed as indicated, she was taken to the operative suite and placed under general anesthesia. She was sterilely prepped and draped in the synchronous position in Faisal stirrups and a time-out was performed. I inserted the VCare uterine manipulator. The balloon was inserted and inflated with 5 cc of air. The VCare cup was sewn to the cervix at 3 o'clock and 9 o'clock and the vaginal occluding cup was positioned and the manipulator tightened. I then changed gloves and proceeded with the laparoscopic portion of the case.     A 10 mm infraumbilical semi-lunar incision was made, the Veress needle was inserted. The abdomen was insufflated with CO2. A 10/12 mm trocar was inserted. The laparoscope was introduced and no internal injuries were noted.  We inserted two  additional 5 mm ports one lateral and left of the infraumbilical port and one in the left lower quadrant, all under direct  visualization, avoiding abdominal vasculature. No injuries were noted after. Marcaine 0.25% with dilute epinephrine 1:200,000 was used at each trocar site prior to insertion of the trocars. There were adhesions of the omentum to the anterior abdominal wall on the left, and the tube and ovary, and sigmoid colon were adherent to one another and both were adherent to the left pelvic sidewall and abdomen. These were taken down with sharp dissection being careful not to damage the sigmoid colon. There were some adhesions anterior to the uterus from a prior  section at the level of the bladder flap. The upper abdomen appeared normal. The uterus was diverted to the right side and LigaSure device  used to cauterize across the left round ligament. The ureter was seen throughout its course and was well clear of the dissection. The IP pedicle on the left was crossclamped, cauterized and divided with the LigaSure device. Sequentially, the cardinal ligament complex was taken down with the LigaSure device  to the level of the bladder flap. The same was carried out on the right, cauterizing and cutting through the right round ligament, exposing the internal portion of the broad ligament. IP ligament on the right at that point was taken down with the LigaSure device. The ureter was seen throughout its pelvic course on the right free of the dissection. Bladder flap was created sharply and with use of monopolar cautery anterior to the uterus. Both cardinal ligaments were taken down sharply as well as with cautery to the level of the internal cervical os. I then entered the vaginal space over the VCare cup and dissecting with monopolar cautery, circumscribed the cervix. This was accomplished with excellent hemostasis, and the uterus was removed vaginally with the VCare cup. No significant bleeding was  noted at that time. A 2-0 V-lock suture was placed within the abdomen through the 10 mm port. We then closed the vaginal cuff incorporating both uterosacral ligaments into the vaginal cuff in a running noninterlocking fashion with use of the Endoclose device. Excellent hemostasis was  noted. The pelvis was irrigated with saline and at that point estimated blood loss was approximately 150 cc. Appendix was visualized and normal.  We desufflated the abdomen. At that point, no further bleeding was noted. All the trocars were removed under direct visualization. The 10 mm infraumbilical port was closed at the level of the fascia with an 0 Vicryl. The skin was closed with 3-0 Monocryl and the additional two 5 mm ports were closed with 3-0 Monocryl as well. The patient was subsequently taken to the postanesthesia care unit in stable condition. There were no  complications. The Garcia catheter was left in place.      Specimen:  Uterus,tubes, and ovaries  EBL:150 ml    Gael Hassan MD FACOG  5:46 PM 6/28/2017

## 2017-12-28 NOTE — PROGRESS NOTES
Patient Information     Patient Name MRN Sex Kerrie Sanderson [id number] Female 1970      Progress Notes by Loretta Childs RN at 2017  1:00 PM     Author:  Loretta Childs RN Service:  (none) Author Type:  NURS- Registered Nurse     Filed:  2017  1:01 PM Date of Service:  2017  1:00 PM Status:  Signed     :  Loretta Childs RN (NURS- Registered Nurse)            Discharge Note    Data:  Kerrie Patel has been discharged home at 1237 via wheelchair accompanied by Nursing Assistant and Family.      Action:  Written discharge/follow-up instructions were provided to patient. Prescriptions were written and sent with patient and were sent to patients pharmacy.  Belongings sent with patient. Equipment none .     Response:  Patient verbalized understanding of discharge instructions, reason for discharge, and necessary follow-up appointments.     Loretta Childs RN ....................  2017   1:01 PM

## 2017-12-28 NOTE — PROGRESS NOTES
Patient Information     Patient Name MRN Sex Kerrie Sanderson [id number] Female 1970      Progress Notes by Aleyda Devries R.T. (ARRT) at 2017  9:41 AM     Author:  Aleyda Devries R.T. (ARRT) Service:  (none) Author Type:  (none)     Filed:  2017  9:41 AM Date of Service:  2017  9:41 AM Status:  Signed     :  Aleyda Devries R.T. (JACKIT) ([facility] - Registered Radiologic Technologist)            Falls Risk Criteria:    Age 65 and older or under age 4        Sensory deficits    Poor vision    Use of ambulatory aides    Impaired judgment    Unable to walk independently    Meets High Risk criteria for falls:  no

## 2017-12-28 NOTE — PATIENT INSTRUCTIONS
Patient Information     Patient Name MRN Kerrie Barrett [id number] Female 1970      Patient Instructions by Hope Santos MD at 2017  1:13 PM     Author:  Hope Santos MD  Service:  (none) Author Type:  Physician     Filed:  2017  1:13 PM  Encounter Date:  2017 Status:  Addendum     :  Hope Santos MD (Physician)        Related Notes: Original Note by Hope Santos MD (Physician) filed at 2017  1:13 PM               Index Indonesian All languages Related topics   Uterus Removal by Laparoscopically Assisted Vaginal Hysterectomy   ________________________________________________________________________  KEY POINTS    A vaginal hysterectomy with laparoscopy is surgery that uses a lighted tube with a camera placed through a small cut near your belly button. This allows your healthcare provider to see organs. The uterus is removed through a cut in the vagina. You may have this surgery to treat fibroids, cancer, pelvic pain, or abnormal vaginal bleeding.    Ask your healthcare provider how long it will take to recover and how to take care of yourself at home.    Make sure you know what symptoms or problems you should watch for and what to do if you have them.  ________________________________________________________________________  What is a laparoscopically assisted vaginal hysterectomy (LAVH)?  A vaginal hysterectomy is surgery to remove the uterus through the vagina. It is a way to take the uterus out through a cut in the vagina instead of through a cut in your belly. The ovaries or fallopian tubes (other female organs) may also be removed when the uterus is removed.  The uterus (womb) is the muscular organ at the top of the vagina. Babies grow in the uterus, and menstrual blood comes from the uterus. If you were having menstrual periods before the surgery, you will no longer have them after the operation. Without your uterus you will not be able to get  pregnant.  When a vaginal hysterectomy is assisted with laparoscopy, your healthcare provider uses a tool called a laparoscope to help with the removal. A laparoscope is a lighted tube with a camera that is placed through a small cut near your belly button. This allows your healthcare provider to see organs. When laparoscopy is used, you will have only small cuts in your belly. This means you will probably have less pain after this operation than if your uterus was removed through a larger cut in your belly, and recovery is usually faster. Some healthcare providers may use a robot to help with this type of hysterectomy.  This procedure does not leave a large visible scar. You may have very small scars from the 2 or 3 tiny cuts in your belly that were used to place tools into your belly.  When is it used?  There are many reasons why your healthcare provider may recommend surgery to remove your uterus. Some of the problems that may be treated with a hysterectomy are:    Abnormal vaginal bleeding that has not been controlled with other treatments    Noncancerous growths in the uterus called fibroids    Abnormal growth of uterine tissue outside the uterus (endometriosis)    A uterus that has dropped down into the vagina and is causing a problem    Precancerous or cancerous cells on the cervix (opening to the womb) or in the uterus    Pelvic pain that has not been controlled with other treatments  Ask your healthcare provider about your choices for treatment and the risks.  How do I prepare for this procedure?     Make plans for your care and recovery after you have the procedure. Find someone to give you a ride home after the procedure. Allow for time to rest and try to find other people to help with your day-to-day tasks while you recover.    Follow your healthcare provider's instructions about not smoking before and after the procedure. Smokers may have more breathing problems during the procedure and heal more  slowly. It is best to quit 6 to 8 weeks before surgery.    You may or may not need to take your regular medicines the day of the procedure. Tell your healthcare provider about all medicines and supplements that you take. Some products may increase your risk of side effects. Ask your healthcare provider if you need to avoid taking any medicine or supplements before the procedure.    Tell your healthcare provider if you have any food, medicine, or other allergies such as latex.    Your healthcare provider will tell you when to stop eating and drinking before the procedure. This helps to keep you from vomiting during the procedure.    Your healthcare provider may ask you to take an enema or medicine to clean out your bowel before the procedure.    Follow any other instructions your healthcare provider gives you.    Ask any questions you have before the procedure. You should understand what your healthcare provider is going to do. You have the right to make decisions about your healthcare and to give permission for any tests or procedures.  What happens during the procedure?  The procedure is usually done at the hospital.  You will be given a regional or general anesthetic to keep you from feeling pain. A regional anesthetic numbs the lower part of your body while you stay awake. General anesthesia relaxes your muscles and puts you into a deep sleep.  Your healthcare provider will make a small cut near your bellybutton. Your belly will be inflated with carbon dioxide gas. This helps your provider see your organs. Your healthcare provider will put a laparoscope through the cut. The scope is used to see the uterus and guide other tools through other small cuts in your belly. Your provider will make a cut in the vagina and remove the uterus through the vagina. The ovaries or fallopian tubes (other female organs) may also be removed. The laparoscope and other tools are then removed and the cuts in your belly are closed.    If ligaments and other tissue around the vagina have stretched from aging or childbearing, your provider may also repair the walls of the vagina. The top of the vagina is then sewn closed.  What happens after the procedure?  You may sometimes go home the same day as your surgery, or you may stay in the hospital for 1 to 3 days.  You may need to go home with a catheter in your bladder until your bladder is working normally again. Your healthcare provider will decide when the catheter can be removed during a follow-up visit.  You may have some pain, nausea, or vomiting right after the procedure. Your healthcare provider may give you medicine to help these problems.  Sometimes the gas used to inflate your belly will cause pain in your right shoulder. It usually goes away after a day or two of bed rest.  Eating fruits and vegetables and drinking extra fluids may help you avoid constipation. Constipation is common after surgery because of some medicines and inactivity. If diet and extra fluids are not enough to avoid constipation, your provider may recommend a stool softener or a laxative. Check with your healthcare provider if constipation keeps being a problem.  If your ovaries are removed, menopause will start right away if you haven t already had menopause. Your healthcare provider may prescribe medicine, such as hormone therapy, to help relieve some of the symptoms of menopause. Be sure to discuss any concerns you have about these effects and treatments with your provider before the surgery.  Ask your healthcare provider:    How long it will take to recover    If there are activities you should avoid, including how much weight you can lift, and when you can return to your normal activities    How to take care of yourself at home    What symptoms or problems you should watch for and what to do if you have them  Make sure you know when you should come back for a checkup. Keep all appointments for provider visits or  tests.  What are the risks of this procedure?  Every procedure or treatment has risks. Some possible risks of this procedure include:    You may have problems with anesthesia.    You may have infection, bleeding, or blood clots.    Other parts of your body may be injured during the procedure.  Ask your healthcare provider how these risks apply to you. Be sure to discuss any other questions or concerns that you may have.  Developed by [employer].  Adult Advisor 2016.3 published by [employer].  Last modified: 2016-03-21  Last reviewed: 2016-03-21  This content is reviewed periodically and is subject to change as new health information becomes available. The information is intended to inform and educate and is not a replacement for medical evaluation, advice, diagnosis or treatment by a healthcare professional.  References   Adult Advisor 2016.3 Index    Copyright   2016 [employer], a division of [employer] All rights reserved.

## 2017-12-28 NOTE — TELEPHONE ENCOUNTER
Patient Information     Patient Name MRN Sex Kerrie Sanderson [id number] Female 1970      Telephone Encounter by Johanna Vela at 2017  4:11 PM     Author:  Johanna Vela Service:  (none) Author Type:  NURS- Registered Nurse     Filed:  2017  4:21 PM Encounter Date:  2017 Status:  Signed     :  Johanna Vela (NURS- Registered Nurse)            Patient incision is good, small amount of blood after shower no concern had stopped after wiping it.  Patient is questioning with bowel movement lower abdomen discomfort with initial start of bowel movement soft stool when bowel movement is done discomfort goes away.  She is wondering if this is normal due to problem with ovary twisted in intestine states patient.  OK for Thursday.  Johanna Vela RN .............. 2017  4:18 PM

## 2017-12-28 NOTE — TELEPHONE ENCOUNTER
Patient Information     Patient Name MRN Sex Kerrie Sanderson [id number] Female 1970      Telephone Encounter by Johanna Vela at 2017 10:18 AM     Author:  Johanna Vela Service:  (none) Author Type:  NURS- Registered Nurse     Filed:  2017 10:22 AM Encounter Date:  2017 Status:  Signed     :  Johanna Vela (NURS- Registered Nurse)            Patient is currently 9 days post-op is having vomiting and diarrhea and sweats.  Patient will go to ED to be evaluated.  Johanna Vela RN .............. 2017  10:22 AM

## 2017-12-28 NOTE — TELEPHONE ENCOUNTER
"Patient Information     Patient Name MRN Kerrie Barrett [id number] Female 1970      Telephone Encounter by Heidi Bueno RN at 2017  1:13 PM     Author:  Heidi Bueno RN Service:  (none) Author Type:  NURS- Registered Nurse     Filed:  2017  1:22 PM Encounter Date:  2017 Status:  Signed     :  Heidi Bueno RN (NURS- Registered Nurse)            Call placed to patient - patient is calling as she is unsure if she should continue taking the prescription that was prescribed by Dr Gael Hassan MD, FACOG. Patient was asked what prescription exactly she is talking about. Patient states that the medication says \" mg tablets\" on the bottle. Currently, patient's medication list only shows her taking Norethindrone 5 mg tablets. Patient states she will go home, collect her medications, and call this nurse back. Nothing needed until patient returns phone call.  Heidi Bueno RN............. 2017 1:22 PM         "

## 2017-12-28 NOTE — PROGRESS NOTES
Patient Information     Patient Name MRN Sex Kerrie Sanderson [id number] Female 1970      Progress Notes by Gael Hassan MD at 2017 10:15 AM     Author:  Gael Hassan MD Service:  (none) Author Type:  Physician     Filed:  2017 12:56 PM Encounter Date:  2017 Status:  Signed     :  Gael Hassan MD (Physician)            SUBJECTIVE:    Kerrie Patel is a 47 y.o. female who presents for postop checkuck    HPI  She is almost two weeks from Total lap hysterectomy and BSO. She developed fever with nausea, vomiting and diarrhea last week. CT was done in ED and showed colitis. She was started on Levaquin and Flagyl. She still has diarrhea. No fever. Bloated and tired.  Some nausea when eating. No vaginal bleeding or foul discharge.    Allergies     Allergen  Reactions     Amoxicillin Nausea And Vomiting     Lactose GI Bleeding   ,   Current Outpatient Prescriptions on File Prior to Visit       Medication  Sig Dispense Refill     docusate (COLACE) 100 mg capsule Take 1 capsule by mouth once daily. 100 capsule 0     ibuprofen (ADVIL; MOTRIN) 200 mg tablet Take 1-3 tablets by mouth every 6 hours if needed for Pain. 100 tablet 0     levoFLOXacin (LEVAQUIN) 750 mg tablet Take 1 tablet by mouth once daily for 10 days. 10 tablet 0     metroNIDAZOLE (FLAGYL) 500 mg tablet Take 1 tablet by mouth 2 times daily for 10 days. 20 tablet 0     norethindrone (NORLUTATE) 5 mg tablet Take 1 tablet by mouth once daily. 90 tablet 0     ondansetron (ZOFRAN ODT) 4 mg disintegrating tablet Place 1 tablet on the tongue every 8 hours if needed for Nausea/Vomiting. 20 tablet 0     No current facility-administered medications on file prior to visit.    ,   Past Medical History:     Diagnosis  Date     Back pain, thoracic      FH: diabetes mellitus      Fibrocystic breast disease      GERD (gastroesophageal reflux disease)    ,   Past Surgical History:      Procedure  Laterality Date      SECTION  96, 99      CHOLECYSTECTOMY  2003     HI TLH W TUBES/OVAR 250 G OR LESS  6/28/2017            PREMALIG/BENIGN SKIN LESION EXCISION  8/24/2012    BREAST LESION      and   Social History     Substance Use Topics       Smoking status: Never Smoker     Smokeless tobacco: Never Used     Alcohol use No       REVIEW OF SYSTEMS:  Review of Systems   Constitutional: Positive for malaise/fatigue. Negative for chills and fever.   Respiratory: Negative.    Cardiovascular: Negative.    Gastrointestinal: Positive for abdominal pain, diarrhea and nausea. Negative for blood in stool, constipation and vomiting.   Genitourinary: Negative for dysuria, frequency, hematuria and urgency.   Endo/Heme/Allergies: Negative.        OBJECTIVE:  /82  Pulse 64  Temp 98.4  F (36.9  C) (Tympanic)  Wt 71.8 kg (158 lb 6.4 oz)  LMP 06/10/2017  BMI 29.93 kg/m2    EXAM:   Physical Exam   Abdominal: There is no tenderness.   Incisions intact and dry. Small blister on the LLQ incision where steri strip was removed.  Mildly distended abdomen.   Vitals reviewed.      ASSESSMENT/PLAN:    ICD-10-CM    1. Infectious diarrhea A09 CLOSTRIDIUM DIFFICILE TOXIN PCR      STOOL CULTURE PANEL      GIARDIA ANTIGEN      OVA / PARASITE EXAM      CBC WITH DIFFERENTIAL      CBC WITH DIFFERENTIAL      CBC WITH AUTO DIFFERENTIAL      Results for orders placed or performed in visit on 07/11/17      CBC WITH AUTO DIFFERENTIAL      Result  Value Ref Range    WHITE BLOOD COUNT         7.3 4.5 - 11.0 thou/cu mm    RED BLOOD COUNT           4.37 4.00 - 5.20 mil/cu mm    HEMOGLOBIN                13.1 12.0 - 16.0 g/dL    HEMATOCRIT                38.9 33.0 - 51.0 %    MCV                       89 80 - 100 fL    MCH                       30.0 26.0 - 34.0 pg    MCHC                      33.7 32.0 - 36.0 g/dL    RDW                       13.8 11.5 - 15.5 %    PLATELET COUNT            266 140 - 440 thou/cu mm    MPV                       10.4 6.5 - 11.0 fL    NEUTROPHILS                58.8 42.0 - 72.0 %    LYMPHOCYTES               26.4 20.0 - 44.0 %    MONOCYTES                 6.8 <12.0 %    EOSINOPHILS               6.7 <8.0 %    BASOPHILS                 1.2 <3.0 %    IMMATURE GRANULOCYTES(METAS,MYELOS,PROS) 0.1 %    ABSOLUTE NEUTROPHILS      4.3 1.7 - 7.0 thou/cu mm    ABSOLUTE LYMPHOCYTES      1.9 0.9 - 2.9 thou/cu mm    ABSOLUTE MONOCYTES        0.5 <0.9 thou/cu mm    ABSOLUTE EOSINOPHILS      0.5 (H) <0.5 thou/cu mm    ABSOLUTE BASOPHILS        0.1 <0.3 thou/cu mm    ABSOLUTE IMMATURE GRANULOCYTES(METAS,MYELOS,PROS) 0.0 <=0.3 thou/cu mm       Plan:  Will work her up for infectious diarrhea, favor C. Diff.  Continue po flagyl and Levaquin. Recheck in a week.    Gael Hassan MD FACOG  12:55 PM 7/11/2017

## 2017-12-28 NOTE — H&P
Patient Information     Patient Name MRN Sex Kerrie Sanderson [id number] Female 1970      H&P (View-Only) by Hope Santos MD at 2017 12:45 PM     Author:  Hope Santos MD Service:  (none) Author Type:  Physician     Filed:  2017  1:44 PM Date of Service:  2017 12:45 PM Status:  Signed     :  Hope Santos MD (Physician)              PREOPERATIVE CLEARANCE  Date of Exam: 2017    Nursing Notes:   Zaynab Ventura  2017 12:56 PM  Signed  This patient presents today for a Preoperative exam for this procedure: Laparoscopic Hysterectomy   Date of Surgery: 2017   Surgeon:  Dr. Hassan  Facility:  [facility]............................ 2017 12:49 PM           HPI:  Kerrie Patel is a 47 y.o. Female with ongoing menorrhagia and pelvic pain with menses and even between presents for preoperative clearance for above procedure. She has no chronic health issues and is otherwise healthy.  Recent chart notes and procedures as well as US noted. Pap normal and EMB benign.    Currently works out at [employer] and has lost about 30# in the past year.     Problem List:   Patient Active Problem List     Diagnosis  Code     FH DIABETES Z83.3     FIBROCYSTIC BREAST DISEASE N60.19      Histories:  Past Medical History:     Diagnosis  Date     Back pain, thoracic      FH: diabetes mellitus      Fibrocystic breast disease      GERD (gastroesophageal reflux disease)       Past Surgical History:      Procedure  Laterality Date      SECTION  96, 99     CHOLECYSTECTOMY       PREMALIG/BENIGN SKIN LESION EXCISION  2012    BREAST LESION       Social History     Social History        Marital status:       Spouse name: N/A     Number of children:  2     Years of education:  N/A     Occupational History      Not on file.     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    Spouse, Ruperto, teaches people how to fight fires and has even done this internationally.    .    Has 2 sons, Chuckie, 1996, Jordy, 1999.              Obstetric History       T2      L2     SAB0   TAB0   Ectopic0   Multiple0   Live Births2      Family History       Problem   Relation Age of Onset     Diabetes  Father      Type 2        Allergies: Amoxicillin and Lactose   Latex Allergy: no    Current Medications:    Medications have been reviewed by me and are current to the best of my knowledge and ability.    Recent use of: no recent use of aspirin (ASA), NSAIDS or steroids    HABITS:   Social History     Substance Use Topics       Smoking status: Never Smoker     Smokeless tobacco: Never Used     Alcohol use No   Tetanus up to date: Done today.     Proposed anesthesia: General  Anesthesia Complications: None  History of abnormal bleeding : None  History of Blood Transfusions: No    Health Care Directive or Living Will: yes  Preoperative Evaluation: Obstructive Sleep Apnea screening    S: Snore -  Do you snore loudly? (louder than talking or loud enough to be heard through closed doors)(NO)  T: Tired - Do you often feel tired, fatigued, or sleepy during the daytime?(NO)  O: Observed - Has anyone ever observed you stop breathing during your sleep?(NO)  P: Pressure - Do you have or are you being treated for high blood pressure?(NO)  B: BMI - BMI greater than 35kg/m2?(NO)  A: Age - Age over 50 years old?(NO)  N:  "Neck - Neck circumference greater than 40 cm?(NO)  G: Gender - Gender: Male?(NO)    Total number of \"YES\" responses:  1    Scoring: Low risk of AURELIA 0-2  At Risk of AURELIA: >3 High Risk of AURELIA: 5-8    REVIEW OF SYSTEMS:  A comprehensive review of systems was negative except for items noted in HPI/Subjective.    Contraception-vasectomy     EXAM:   /72  Pulse 60  Temp 97.1  F (36.2  C)  Resp 18  Ht 1.549 m (5' 1\")  Wt 73.5 kg (162 lb)  LMP 06/10/2017  SpO2 98%  Breastfeeding? No  BMI 30.61 kg/m2 Body mass index is 30.61 kg/(m^2).  General Appearance: Pleasant, alert, appropriate appearance for age. No acute distress  Head Exam: Normal. Normocephalic, atraumatic.  Ear Exam: Normal TM's bilaterally. Normal auditory canals and external ears. Non-tender.  OroPharynx Exam: Dental hygiene adequate. Normal buccal mucosa. Normal pharynx.  Neck Exam: Supple, no masses or nodes.  Thyroid Exam: No nodules or enlargement.  Chest/Respiratory Exam: Normal chest wall and respirations. Clear to auscultation.  Cardiovascular Exam: Regular rate and rhythm. S1, S2, no murmur, click, gallop, or rubs.  Musculoskeletal Exam: Back is straight and non-tender, full ROM of upper and lower extremities.  Skin: Normal. and no rash or abnormalities  Neurologic Exam: Normal.    DIAGNOSTICS:   1. EKG: EKG FINDINGS - Not indicated  2. CXR: Not indicated  3. Labs:   Results for orders placed or performed in visit on 06/13/17      CBC WITH AUTO DIFFERENTIAL      Result  Value Ref Range    WHITE BLOOD COUNT         6.6 4.5 - 11.0 thou/cu mm    RED BLOOD COUNT           4.30 4.00 - 5.20 mil/cu mm    HEMOGLOBIN                12.9 12.0 - 16.0 g/dL    HEMATOCRIT                39.3 33.0 - 51.0 %    MCV                       91 80 - 100 fL    MCH                       30.0 26.0 - 34.0 pg    MCHC                      32.8 32.0 - 36.0 g/dL    RDW                       13.7 11.5 - 15.5 %    PLATELET COUNT            189 140 - 440 thou/cu mm    MPV   "                     11.2 (H) 6.5 - 11.0 fL    NEUTROPHILS               60.8 42.0 - 72.0 %    LYMPHOCYTES               27.7 20.0 - 44.0 %    MONOCYTES                 8.1 <12.0 %    EOSINOPHILS               2.6 <8.0 %    BASOPHILS                 0.6 <3.0 %    ABSOLUTE NEUTROPHILS      4.0 1.7 - 7.0 thou/cu mm    ABSOLUTE LYMPHOCYTES      1.8 0.9 - 2.9 thou/cu mm    ABSOLUTE MONOCYTES        0.5 <0.9 thou/cu mm    ABSOLUTE EOSINOPHILS      0.2 <0.5 thou/cu mm    ABSOLUTE BASOPHILS        0.0 <0.3 thou/cu mm     I have personally reviewed the labs listed above.      4. Pre-op urine for pregnancy (for 12 yrs and older or menstruating): will be done at facility at time of surgery    IMPRESSION:      For above listed surgery and anesthesia:   Patient is low risk for perioperative complications.    RECOMMENDATIONS: proceed without further diagnostic evaluation    Electronically Signed by:    Hope Santos MD  1:32 PM 6/13/2017

## 2017-12-28 NOTE — OR NURSING
Patient Information     Patient Name MRN Sex Kerrie Sanderson [id number] Female 1970      OR Nursing by Ilsa Shah RN at 2017  7:07 PM     Author:  Ilsa Shah RN Service:  (none) Author Type:  NURS- Registered Nurse     Filed:  2017  7:09 PM Date of Service:  2017  7:07 PM Status:  Signed     :  Ilsa Shah RN (NURS- Registered Nurse)            PACU Respiratory Event Documentation     1) Episodes of Apnea greater than or equal to 10 seconds: no    2) Bradypnea - less than 8 breaths per minute: no    3) Pain score on 0 to 10 scale: 0/10    4) Pain-sedation mismatch (yes or no): no    5) Repeated 02 desaturation less than 90% (yes or no): no    Anesthesia notified? (yes or no): no    Any of the above events occuring repeatedly in separate 30 minute intervals may be considered recurrent PACU respiratory events.    PACU Transfer Note    Kerrie Patel transferred to [facility] via bed.  Equipment used for transport:  Oxygen, Nasal Cannula.  Accompanied by:  Registered Nurse report to Dana ALCANTAR    Patient stable and meets phase 1 discharge criteria for transport from PACU.

## 2017-12-28 NOTE — TELEPHONE ENCOUNTER
Patient Information     Patient Name MRN Sex Kerrie Sanderson [id number] Female 1970      Telephone Encounter by Johanna Vela at 2017  8:12 AM     Author:  Johanna Vela Service:  (none) Author Type:  NURS- Registered Nurse     Filed:  2017  8:14 AM Encounter Date:  2017 Status:  Signed     :  Johanna Vela (NURS- Registered Nurse)            Called patient per Dr. Hassan in regards to discomfort probably related to surgery and pain medications if increased pain patient will come in.  Johanna Vela RN .............. 2017  8:14 AM

## 2017-12-28 NOTE — TELEPHONE ENCOUNTER
Patient Information     Patient Name MRN Kerrie Barrett [id number] Female 1970      Telephone Encounter by Heidi Bueno RN at 2017  8:22 AM     Author:  Heidi Bueno RN Service:  (none) Author Type:  NURS- Registered Nurse     Filed:  2017  8:29 AM Encounter Date:  2017 Status:  Signed     :  Heidi Bueno RN (NURS- Registered Nurse)            Notified patient that FMLA paperwork is completed and at the [address] window for .  Heidi Bueno RN............. 2017 8:29 AM

## 2017-12-28 NOTE — PROGRESS NOTES
Patient Information     Patient Name MRN Sex Kerrie Sanderson [id number] Female 1970      Progress Notes by Gael Hassan MD at 2017  1:15 PM     Author:  Gael Hassan MD Service:  (none) Author Type:  Physician     Filed:  2017  2:20 PM Encounter Date:  2017 Status:  Signed     :  Gael Hassan MD (Physician)            Kerrie Patel presents today for a postop checkup at 3 weeks after total lap hysterectomy    S: Bowels and bladder are functioning normally, no abnormal bleeding or pain. No concerns about the incision(s). She had recovered from her colitis, finished antibiotics this past weekend.  Has some numbness over her left hip, suspicious for nerve injury, or entrapment from laparoscopic incision site.    Current Outpatient Prescriptions on File Prior to Visit       Medication  Sig Dispense Refill     norethindrone (NORLUTATE) 5 mg tablet Take 1 tablet by mouth once daily. 90 tablet 0     No current facility-administered medications on file prior to visit.      Allergies     Allergen  Reactions     Amoxicillin Nausea And Vomiting     Lactose GI Bleeding     Past Medical History:     Diagnosis  Date     Back pain, thoracic      FH: diabetes mellitus      Fibrocystic breast disease      GERD (gastroesophageal reflux disease)      Past Surgical History:      Procedure  Laterality Date      SECTION  96, 99     CHOLECYSTECTOMY       CO TLH W TUBES/OVAR 250 G OR LESS  2017            PREMALIG/BENIGN SKIN LESION EXCISION  2012    BREAST LESION         COMPLETE REVIEW OF SYSTEMS: see HPI        O: /88  Temp 99.6  F (37.6  C) (Tympanic)   Wt 72.3 kg (159 lb 6.4 oz)  LMP 06/10/2017  BMI 30.12 kg/m2 Body mass index is 30.12 kg/(m^2).    EXAM:  General Appearance: Pleasant, alert, appropriate appearance for age. No acute distress      I/P:  Stable postop  Recheck in one month.    Based on what occurred in the visit today:  Previous medication(s) were  discontinued or altered? No  Previous medication(s) were suspended pending consultation? No  New medication(s) started? No        Gael Hassan MD FACOG  2:18 PM 7/18/2017

## 2017-12-29 NOTE — DISCHARGE SUMMARY
"Patient Information     Patient Name MRN Kerrie Barrett [id number] Female 1970      Discharge Summaries by Gael Hassan MD at 2017  7:29 AM     Author:  Gael Hassan MD Service:  (none) Author Type:  Physician     Filed:  2017  7:30 AM Date of Service:  2017  7:29 AM Status:  Signed     :  Gael Hassan MD (Physician)            HOSPITAL DISCHARGE SUMMARY    Patient Name: Kerrie Patel  YOB: 1970  Age: 47 y.o.  Medical Record Number: [id number]  Primary Physician: No Pcp  Phone: None  Admission Date: 2017  Discharge Date: 2017    She will be discharged from [facility] to home.    PRINCIPAL DISCHARGE DIAGNOSIS: total lap hysterectomy and bilateral salpingo-oophorectomy    Active Problems:    Menorrhagia with regular cycle    Intramural leiomyoma of uterus    Pelvic pain    Endometriosis determined by laparoscopy    BRIEF HOSPITAL COURSE: This 47 y.o. female had an uncomplicated procedure and postop course.    PROCEDURES PERFORMED DURING HOSPITALIZATION: Total lap hysterectomy with salpingo-oophorectomy bilaterally    COMPLICATIONS IN HOSPITAL: None    PERTINENT FINDINGS/RESULTS AT DISCHARGE: /57  Pulse 87  Temp 99  F (37.2  C)  Resp 16  Ht 1.549 m (5' 1\")  Wt 73.8 kg (162 lb 11.2 oz)  LMP 06/10/2017  SpO2 97%  Breastfeeding? No  BMI 30.74 kg/m2   Patient Vitals for the past 72 hrs:   Weight   17 1100 73.8 kg (162 lb 11.2 oz)    None    Latest Laboratory Results:  Chem:  Recent Labs       17   0424   CREATININE  0.75     WBC/Hgb:  Recent Labs         17   0424  17   1145  17   1323   WBC   --    --   6.6   HGB  11.6 L  13.3  12.9     INR:  No results for input(s): INR in the last 720 hours.      IMPORTANT PENDING TEST RESULTS:       These Lab Items may not have been resulted by the time the patient was discharged:   (Through now)            Start     Ordered    17 1730  " PATH TISSUE EXAM  ONE TIME,   TODAY        Question Answer Comment   Enter clinical information pelvic pain, irregular menses    Enter Specimen Source A Uterus, Cervix, Bilateral Fallopian Tubes and Ovaries    Enter time tissue removed specimen A 5:18 PM    Enter specimen A instructions IntraOP        06/28/17 1718          CONDITION AT DISCHARGE:    Improving    DISCHARGE ORDERS     Your Home Medicines      START taking these medicines       Instructions    docusate 100 mg capsule   For diagnoses:  Menorrhagia with regular cycle   Commonly known as:  COLACE    Take 1 capsule by mouth once daily.       ibuprofen 200 mg tablet   For diagnoses:  Menorrhagia with regular cycle   Commonly known as:  ADVIL; MOTRIN   Replaces:  ibuprofen 200 mg Cap    Take 1-3 tablets by mouth every 6 hours if needed for Pain.       norethindrone 5 mg tablet   For diagnoses:  Endometriosis determined by laparoscopy   Commonly known as:  NORLUTATE    Take 1 tablet by mouth once daily.       oxyCODONE-acetaminophen (5-325 mg) 5-325 mg per tablet   For diagnoses:  Menorrhagia with regular cycle   Commonly known as:  PERCOCET    Take 1-2 tablets by mouth every 4 hours if needed  for Pain Max acetaminophen dose: 4000mg in 24 hrs.         STOP taking these medicines          ibuprofen 200 mg Cap   Commonly known as:  ADVIL; MOTRIN   Replaced by:  ibuprofen 200 mg tablet            Where to get your medicines      These medications were sent to [id number] - [place], [place] - [address] - [phone]  [address] [facility] [address]     Phone:  [phone]      docusate 100 mg capsule     ibuprofen 200 mg tablet     norethindrone 5 mg tablet          You have received printed prescription(s) for these medicines or supplies. Take these to your preferred pharmacy.     Bring a paper prescription for each of these medications      oxyCODONE-acetaminophen (5-325 mg) 5-325 mg per tablet            After Discharge Orders and Instructions     Additional information about your medicines:       You have been prescribed pain medicine;   - Do not drive any motor vehicles while taking pain medicines that make you sleepy.   - Do not mix any prescribed pain medicine with alcohol.  ACETOMINOPHEN SAFETY:  -  Do not take more than 4,000 milligrams (4 grams) of acetaminophen in 24 hours.  More than that could damage your liver.  - Acetaminophen is also found in cough and cold medicines.         Caring for your incision:       If you have STERI STRIPS , leave the bandages on for one week.  After that, just keep the area clean and dry.          Follow up appointment:       Return to clinic for follow up in 2 weeks.       Moving around after your hospital visit:       Get regular activity and try to walk for a total of 30 minutes per day.  Start by walking for 5 to 10 minutes at one time and slowly build to walking for 30 minutes one time.    Slowly return to your regular level of activity. Save your energy by spreading out activities that make you tired.  Rest as needed.         Other activities - Total  laparoscopic or vaginal hysterectomy:       -  you may resume driving in 3 to 7 days.  Before you drive again, be sure you do not have severe pain and you are no longer taking prescription pain medicines  -  you can return to work in 6 weeks  -  you can resume sexual activity in 8 weeks  -  you may shower  -  you may take a tub bath after 1 week  -  do not lift more than 20 pounds for 6 weeks (a full gallon of milk is 9 lbs).       Primary Care Provider Follow-Up Appointment Within 5 Days or Less       No Pcp.              Readmission Score:        32                    What you may eat and drink after your hospital stay:       Resume your regular diet.       When should you be concerned?       Call your doctor if:  - you develop a temperature of 101 degrees Fahrenheit  - you have nausea and vomiting that will not  stop  - you have increased pain that cannot be relieved with rest or pain medicine  - you have bright red vaginal bleeding that saturates one pad or more per hour. (It is normal to have some vaginal discharge for several weeks. It may vary in color from red to pink to brown to tan.)  -  your incision becomes red, more tender, has increased drainage, or signs of infection (pain, swelling, redness, odor, warmth, green or yellow discharge)  - you have hives (itchy raised rash)  - you have any new pain or swelling in your legs  - you have problems breathing  - you have chest pain that gets worse with deep breathing or coughing  - you have any change in movement (such as new weakness or inability to move as usual)   - you are unable to urinate or have pain or burning when you urinate  - you have any questions of concerns.  In an emergency, call 911 or have someone take you to the nearest hospital Emergency Department.       Why were you at the hospital?       Total Laparoscopic Hysterectomy                 FOLLOW-UP: She should see Dr. Hassan in 2 weeks.    Specialty follow-up: None    Total time spent for discharge on date of discharge: 10 minutes  I saw the patient on the date of discharge.

## 2017-12-29 NOTE — H&P
Patient Information     Patient Name MRN Sex Kerrie Sanderson [id number] Female 1970      H&P by Hope Santos MD at 2017 12:45 PM     Author:  Hope Santos MD Service:  (none) Author Type:  Physician     Filed:  2017  1:44 PM Encounter Date:  2017 Status:  Signed     :  Hope Santos MD (Physician)              PREOPERATIVE CLEARANCE  Date of Exam: 2017    Nursing Notes:   Zaynab Ventura  2017 12:56 PM  Signed  This patient presents today for a Preoperative exam for this procedure: Laparoscopic Hysterectomy   Date of Surgery: 2017   Surgeon:  Dr. Hassan  Facility:  [facility] L[facility]............................ 2017 12:49 PM           HPI:  Kerrie Patel is a 47 y.o. Female with ongoing menorrhagia and pelvic pain with menses and even between presents for preoperative clearance for above procedure. She has no chronic health issues and is otherwise healthy.  Recent chart notes and procedures as well as US noted. Pap normal and EMB benign.    Currently works out at [employer] and has lost about 30# in the past year.     Problem List:   Patient Active Problem List     Diagnosis  Code     FH DIABETES Z83.3     FIBROCYSTIC BREAST DISEASE N60.19      Histories:  Past Medical History:     Diagnosis  Date     Back pain, thoracic      FH: diabetes mellitus      Fibrocystic breast disease      GERD (gastroesophageal reflux disease)       Past Surgical History:      Procedure  Laterality Date      SECTION  96, 99     CHOLECYSTECTOMY       PREMALIG/BENIGN SKIN LESION EXCISION  2012    BREAST LESION       Social History     Social History        Marital status:       Spouse name: N/A     Number of children:  2     Years of education:  N/A     Occupational History      Not on file.     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    Spouse, Ruperto, teaches people how to fight fires and has even done this internationally.    .    Has 2 sons, Chuckie, 1996, Jordy, 1999.              Obstetric History       T2      L2     SAB0   TAB0   Ectopic0   Multiple0   Live Births2      Family History       Problem   Relation Age of Onset     Diabetes  Father      Type 2        Allergies: Amoxicillin and Lactose   Latex Allergy: no    Current Medications:    Medications have been reviewed by me and are current to the best of my knowledge and ability.    Recent use of: no recent use of aspirin (ASA), NSAIDS or steroids    HABITS:   Social History     Substance Use Topics       Smoking status: Never Smoker     Smokeless tobacco: Never Used     Alcohol use No   Tetanus up to date: Done today.     Proposed anesthesia: General  Anesthesia Complications: None  History of abnormal bleeding : None  History of Blood Transfusions: No    Health Care Directive or Living Will: yes  Preoperative Evaluation: Obstructive Sleep Apnea screening    S: Snore -  Do you snore loudly? (louder than talking or loud enough to be heard through closed doors)(NO)  T: Tired - Do you often feel tired, fatigued, or sleepy during the daytime?(NO)  O: Observed - Has anyone ever observed you stop breathing during your sleep?(NO)  P: Pressure - Do you have or are you being treated for high blood pressure?(NO)  B: BMI - BMI greater than 35kg/m2?(NO)  A: Age - Age over 50 years old?(NO)  N: Neck - Neck  "circumference greater than 40 cm?(NO)  G: Gender - Gender: Male?(NO)    Total number of \"YES\" responses:  1    Scoring: Low risk of AURELIA 0-2  At Risk of AURELIA: >3 High Risk of AURELIA: 5-8    REVIEW OF SYSTEMS:  A comprehensive review of systems was negative except for items noted in HPI/Subjective.    Contraception-vasectomy     EXAM:   /72  Pulse 60  Temp 97.1  F (36.2  C)  Resp 18  Ht 1.549 m (5' 1\")  Wt 73.5 kg (162 lb)  LMP 06/10/2017  SpO2 98%  Breastfeeding? No  BMI 30.61 kg/m2 Body mass index is 30.61 kg/(m^2).  General Appearance: Pleasant, alert, appropriate appearance for age. No acute distress  Head Exam: Normal. Normocephalic, atraumatic.  Ear Exam: Normal TM's bilaterally. Normal auditory canals and external ears. Non-tender.  OroPharynx Exam: Dental hygiene adequate. Normal buccal mucosa. Normal pharynx.  Neck Exam: Supple, no masses or nodes.  Thyroid Exam: No nodules or enlargement.  Chest/Respiratory Exam: Normal chest wall and respirations. Clear to auscultation.  Cardiovascular Exam: Regular rate and rhythm. S1, S2, no murmur, click, gallop, or rubs.  Musculoskeletal Exam: Back is straight and non-tender, full ROM of upper and lower extremities.  Skin: Normal. and no rash or abnormalities  Neurologic Exam: Normal.    DIAGNOSTICS:   1. EKG: EKG FINDINGS - Not indicated  2. CXR: Not indicated  3. Labs:   Results for orders placed or performed in visit on 06/13/17      CBC WITH AUTO DIFFERENTIAL      Result  Value Ref Range    WHITE BLOOD COUNT         6.6 4.5 - 11.0 thou/cu mm    RED BLOOD COUNT           4.30 4.00 - 5.20 mil/cu mm    HEMOGLOBIN                12.9 12.0 - 16.0 g/dL    HEMATOCRIT                39.3 33.0 - 51.0 %    MCV                       91 80 - 100 fL    MCH                       30.0 26.0 - 34.0 pg    MCHC                      32.8 32.0 - 36.0 g/dL    RDW                       13.7 11.5 - 15.5 %    PLATELET COUNT            189 140 - 440 thou/cu mm    MPV               "         11.2 (H) 6.5 - 11.0 fL    NEUTROPHILS               60.8 42.0 - 72.0 %    LYMPHOCYTES               27.7 20.0 - 44.0 %    MONOCYTES                 8.1 <12.0 %    EOSINOPHILS               2.6 <8.0 %    BASOPHILS                 0.6 <3.0 %    ABSOLUTE NEUTROPHILS      4.0 1.7 - 7.0 thou/cu mm    ABSOLUTE LYMPHOCYTES      1.8 0.9 - 2.9 thou/cu mm    ABSOLUTE MONOCYTES        0.5 <0.9 thou/cu mm    ABSOLUTE EOSINOPHILS      0.2 <0.5 thou/cu mm    ABSOLUTE BASOPHILS        0.0 <0.3 thou/cu mm     I have personally reviewed the labs listed above.      4. Pre-op urine for pregnancy (for 12 yrs and older or menstruating): will be done at facility at time of surgery    IMPRESSION:      For above listed surgery and anesthesia:   Patient is low risk for perioperative complications.    RECOMMENDATIONS: proceed without further diagnostic evaluation    Electronically Signed by:    Hope Santos MD  1:32 PM 6/13/2017

## 2018-01-05 ENCOUNTER — OFFICE VISIT - GICH (OUTPATIENT)
Dept: OBGYN | Facility: OTHER | Age: 48
End: 2018-01-05

## 2018-01-05 DIAGNOSIS — Z92.29 PERSONAL HISTORY OF OTHER DRUG THERAPY (CODE): ICD-10-CM

## 2018-01-05 NOTE — ADDENDUM NOTE
Patient Information     Patient Name MRN Sex Kerrie Sanderson [id number] Female 1970      Addendum Note by Candace Teague at 2017  4:32 PM     Author:  Candace Teague Service:  (none) Author Type:  (none)     Filed:  2017  4:32 PM Encounter Date:  2017 Status:  Signed     :  Candace Teague       Addended by: CANDACE TEAGUE on: 2017 04:32 PM        Modules accepted: Orders

## 2018-01-05 NOTE — PROGRESS NOTES
Patient Information     Patient Name MRN Kerrie Barrett [id number] Female 1970      Progress Notes by Gael Hassan MD at 2017  1:00 PM     Author:  Gael Hassan MD Service:  (none) Author Type:  Physician     Filed:  2017  4:23 PM Encounter Date:  2017 Status:  Signed     :  Gael Hassan MD (Physician)            SUBJECTIVE:    Kerrie Patel is a 47 y.o. female who presents for cyclical pelvic pain and heavy periods.    Vaginal Bleeding   The patient's primary symptoms include pelvic pain and vaginal bleeding. This is a chronic problem. The current episode started more than 1 month ago. The problem occurs intermittently. The problem has been gradually worsening. Associated symptoms include abdominal pain and joint pain. Pertinent negatives include no constipation, dysuria, fever, flank pain or nausea. The vaginal bleeding is heavier than menses. She has been passing clots. She is sexually active. No, her partner does not have an STD. She uses vasectomy for contraception. Her menstrual history has been regular. Her past medical history is significant for an abdominal surgery, a  section, endometriosis and menorrhagia.       Allergies     Allergen  Reactions     Amoxicillin Nausea And Vomiting     Lactose GI Bleeding   ,   Family History      Problem  Relation Age of Onset     Diabetes Father      Heart Disease Maternal Grandfather    ,   No current outpatient prescriptions on file prior to visit.     No current facility-administered medications on file prior to visit.    ,   Past Medical History:     Diagnosis  Date     Back pain, thoracic      FH: diabetes mellitus      Fibrocystic breast disease      GERD (gastroesophageal reflux disease)    ,   Past Surgical History:      Procedure  Laterality Date      SECTION  96, 99     CHOLECYSTECTOMY       PREMALIG/BENIGN SKIN LESION EXCISION  2012    BREAST LESION      and   Social History     Substance Use  "Topics       Smoking status: Never Smoker     Smokeless tobacco: Never Used     Alcohol use No       REVIEW OF SYSTEMS:  Review of Systems   Constitutional: Negative.  Negative for fever and weight loss.   Respiratory: Negative.    Cardiovascular: Negative.    Gastrointestinal: Positive for abdominal pain. Negative for constipation and nausea.   Genitourinary: Positive for menorrhagia, pelvic pain and vaginal bleeding. Negative for dysuria and flank pain.   Musculoskeletal: Positive for joint pain.   Endo/Heme/Allergies: Negative.        OBJECTIVE:  /90  Pulse 88  Ht 1.549 m (5' 1\")  Wt 73.5 kg (162 lb)  LMP 05/16/2017  Breastfeeding? No  BMI 30.61 kg/m2    EXAM:   Physical Exam   Constitutional: She is well-developed, well-nourished, and in no distress.   Cardiovascular: Normal rate and regular rhythm.    Pulmonary/Chest: Effort normal and breath sounds normal.   Abdominal: Soft. She exhibits no distension. There is no tenderness. There is no rebound and no guarding.   Genitourinary: Vulva normal. Uterus is enlarged and tender. Uterus is not fixed. Right adnexum displays no mass and no tenderness. Left adnexum displays no mass and no tenderness.   Genitourinary Comments: After consent was obtained, the cervix was prepped with betadine. The cervix was grasped with a tenaculum. She sounded to 8 cm and three passes were performed productive of modest amount of tissue. The cervix was released and no bleeding noted. Patient tolerated the procedure well.     US reviewed. Fibroid uterus noted.  Results for orders placed or performed in visit on 05/25/17      TSH      Result  Value Ref Range    TSH 1.56 0.34 - 5.60 uIU/mL   T3,FREE      Result  Value Ref Range    T3,FREE 3.37 2.50 - 3.90 pg/mL   T4,FREE      Result  Value Ref Range    T4,FREE 1.04 0.58 - 1.64 ng/dL   PROLACTIN      Result  Value Ref Range    PROLACTIN 12.19 ng/mL         ASSESSMENT/PLAN:    ICD-10-CM    1. Menorrhagia with regular cycle N92.0 " TSH      T3,FREE      T4,FREE      PROLACTIN      pathology specimen      AL BIOPSY OF UTERUS LINING      TSH      T3,FREE      T4,FREE      PROLACTIN      pathology specimen   2. Pelvic pain R10.2         Plan:  Discussed options to manage heavy bleeding and fibroids. This includes hormonal manipulation, resection of fibroids, use of Lupron, US ablation, uterine artery embolization, laparoscopic hysterectomy.  She is leaning toward total lap hysterectomy. We discussed possible removal of ovaries and tubes if she has recurrent endometriosis for definitive management.  WIll inform her of biopsy results when available. May proceed with surgery if benign.

## 2018-01-27 VITALS
SYSTOLIC BLOOD PRESSURE: 134 MMHG | WEIGHT: 158.4 LBS | HEART RATE: 64 BPM | BODY MASS INDEX: 29.93 KG/M2 | DIASTOLIC BLOOD PRESSURE: 82 MMHG | TEMPERATURE: 98.4 F

## 2018-01-27 VITALS
SYSTOLIC BLOOD PRESSURE: 112 MMHG | WEIGHT: 162 LBS | BODY MASS INDEX: 30.58 KG/M2 | BODY MASS INDEX: 30.12 KG/M2 | BODY MASS INDEX: 30.58 KG/M2 | TEMPERATURE: 99.6 F | WEIGHT: 162 LBS | HEIGHT: 61 IN | HEART RATE: 88 BPM | DIASTOLIC BLOOD PRESSURE: 88 MMHG | TEMPERATURE: 97.1 F | OXYGEN SATURATION: 98 % | DIASTOLIC BLOOD PRESSURE: 72 MMHG | SYSTOLIC BLOOD PRESSURE: 148 MMHG | DIASTOLIC BLOOD PRESSURE: 90 MMHG | RESPIRATION RATE: 18 BRPM | SYSTOLIC BLOOD PRESSURE: 138 MMHG | WEIGHT: 159.4 LBS | HEIGHT: 61 IN | HEART RATE: 60 BPM

## 2018-01-27 VITALS
HEIGHT: 61 IN | BODY MASS INDEX: 30.73 KG/M2 | DIASTOLIC BLOOD PRESSURE: 70 MMHG | SYSTOLIC BLOOD PRESSURE: 128 MMHG | WEIGHT: 162.8 LBS | HEART RATE: 60 BPM | TEMPERATURE: 98.5 F

## 2018-01-28 ENCOUNTER — TRANSFERRED RECORDS (OUTPATIENT)
Dept: HEALTH INFORMATION MANAGEMENT | Facility: CLINIC | Age: 48
End: 2018-01-28

## 2018-02-07 ENCOUNTER — DOCUMENTATION ONLY (OUTPATIENT)
Dept: FAMILY MEDICINE | Facility: OTHER | Age: 48
End: 2018-02-07

## 2018-02-07 PROBLEM — Z83.3 FAMILY HISTORY OF DIABETES MELLITUS: Status: ACTIVE | Noted: 2018-02-07

## 2018-02-07 PROBLEM — R10.2 PELVIC PAIN: Status: ACTIVE | Noted: 2017-06-28

## 2018-02-09 VITALS
BODY MASS INDEX: 31.25 KG/M2 | SYSTOLIC BLOOD PRESSURE: 132 MMHG | WEIGHT: 165.4 LBS | HEART RATE: 74 BPM | DIASTOLIC BLOOD PRESSURE: 86 MMHG

## 2018-02-12 NOTE — PROGRESS NOTES
Patient Information     Patient Name MRN Sex Kerrie Sanderson [id number] Female 1970      Progress Notes by Gael Hassan MD at 2018 10:00 AM     Author:  Gael Hassan MD Service:  (none) Author Type:  Physician     Filed:  2018 11:00 AM Encounter Date:  2018 Status:  Signed     :  Gael Hassan MD (Physician)            SUBJECTIVE:    Kerrie Patel is a 47 y.o. female who presents for discussion of hormone replacement.    ZEYAD Sy had a hysterectomy and BSO in  of this past year. She has stopped having hot flashes and is no longer taking any hormones. She gets about one hot flash a month at this point. She has questions regarding safety of going on estrogen and what reason she should consider it. She has no other concerns and is otherwise in good health.    Allergies     Allergen  Reactions     Amoxicillin Nausea And Vomiting     Lactose GI Bleeding   ,   No current outpatient prescriptions on file prior to visit.     No current facility-administered medications on file prior to visit.    ,   Past Medical History:     Diagnosis  Date     Back pain, thoracic      FH: diabetes mellitus      Fibrocystic breast disease      GERD (gastroesophageal reflux disease)     and   Past Surgical History:      Procedure  Laterality Date      SECTION  96, 99     CHOLECYSTECTOMY       AR TLH W TUBES/OVAR 250 G OR LESS  2017            PREMALIG/BENIGN SKIN LESION EXCISION  2012    BREAST LESION         REVIEW OF SYSTEMS:  Review of Systems   All other systems reviewed and are negative.      OBJECTIVE:  /86 (Cuff Site: Right Arm, Position: Sitting, Cuff Size: Adult Large)  Pulse 74  Wt 75 kg (165 lb 6.4 oz)  LMP 06/10/2017 (Exact Date)  BMI 31.25 kg/m2    EXAM:   Physical Exam   Constitutional: She is well-developed, well-nourished, and in no distress.       ASSESSMENT/PLAN:  No diagnosis found.     Plan:  We had a discussion regarding risks benefits and alternatives  to hormone therapy for hot flashes versus preventative treatment. For her eye would not recommend estrogen at this time and she is very minimally bothered by hot flashes and no other hormonal type concerns today. We did discuss having her get a baseline bone density study within the next few years as well as watching her cholesterol. If she develops genitourinary atrophic symptoms then could consider resumption of low-dose estrogen or topical estrogen.

## 2018-07-23 NOTE — PROGRESS NOTES
Patient Information     Patient Name  Kerrie Patel MRN  [id number] Sex  Female   1970      Letter by Gael Hassan MD at      Author:  Gael Hassan MD Service:  (none) Author Type:  (none)    Filed:   Encounter Date:  2017 Status:  (Other)           Kerrie Patel  [address]  [facility] [address]          May 31, 2017    Dear Ms. Patel:    The result from the Pap test(s) you had done at your recent clinic visit came back as normal.     We recommend that you have an adult physical exam each year. Depending on your Pap test history, you may or may not need a Pap test at these visits.  You and your health care provider will decide what is right for you.    If you have any further questions or concerns, please call [phone]. You may also contact us by using medical messaging if you have MyChart.    Thank you for choosing [facility] to participate in your healthcare needs.    Sincerely,    Dr Gael Hassan MD, FACOG  Johanna Vela RN        The [place] Screening Program is a statewide comprehensive breast and cervical cancer screening program that provides free screening and follow-up services (including colposcopy) to uninsured and underinsured women. For more information call toll free [phone] ([phone])

## 2018-07-23 NOTE — PROGRESS NOTES
Patient Information     Patient Name  Kerrie Patel MRN  [id number] Sex  Female   1970      Letter by Gael Hassan MD at      Author:  Gael Hassan MD Service:  (none) Author Type:  (none)    Filed:   Encounter Date:  2017 Status:  (Other)           Kerrie Patel  [address]  [facility] [address]          2017      CERTIFICATE TO RETURN TO WORK OR SCHOOL      Kerrie Patel has been under my care through 2017 and is able to return to work / school on 17.      Remarks: No lifting over 20 lbs until six weeks after surgery.    Sincerely,      Gael Hassan MD FACOG  1:33 PM 2017

## 2018-11-01 ENCOUNTER — OFFICE VISIT (OUTPATIENT)
Dept: FAMILY MEDICINE | Facility: OTHER | Age: 48
End: 2018-11-01
Attending: NURSE PRACTITIONER
Payer: COMMERCIAL

## 2018-11-01 VITALS
WEIGHT: 172.8 LBS | TEMPERATURE: 98.1 F | HEART RATE: 84 BPM | BODY MASS INDEX: 32.65 KG/M2 | SYSTOLIC BLOOD PRESSURE: 138 MMHG | DIASTOLIC BLOOD PRESSURE: 82 MMHG | RESPIRATION RATE: 18 BRPM

## 2018-11-01 DIAGNOSIS — R10.32 LLQ ABDOMINAL PAIN: Primary | ICD-10-CM

## 2018-11-01 LAB
ALBUMIN SERPL-MCNC: 4.3 G/DL (ref 3.5–5.7)
ALP SERPL-CCNC: 73 U/L (ref 34–104)
ALT SERPL W P-5'-P-CCNC: 42 U/L (ref 7–52)
ANION GAP SERPL CALCULATED.3IONS-SCNC: 6 MMOL/L (ref 3–14)
AST SERPL W P-5'-P-CCNC: 26 U/L (ref 13–39)
BASOPHILS # BLD AUTO: 0.1 10E9/L (ref 0–0.2)
BASOPHILS NFR BLD AUTO: 0.7 %
BILIRUB SERPL-MCNC: 0.3 MG/DL (ref 0.3–1)
BUN SERPL-MCNC: 18 MG/DL (ref 7–25)
CALCIUM SERPL-MCNC: 9.7 MG/DL (ref 8.6–10.3)
CHLORIDE SERPL-SCNC: 106 MMOL/L (ref 98–107)
CO2 SERPL-SCNC: 29 MMOL/L (ref 21–31)
CREAT SERPL-MCNC: 0.93 MG/DL (ref 0.6–1.2)
CRP SERPL HS-MCNC: 1.39 MG/L (ref 0.2–160)
DIFFERENTIAL METHOD BLD: NORMAL
EOSINOPHIL # BLD AUTO: 0.3 10E9/L (ref 0–0.7)
EOSINOPHIL NFR BLD AUTO: 3.6 %
ERYTHROCYTE [DISTWIDTH] IN BLOOD BY AUTOMATED COUNT: 13.1 % (ref 10–15)
ERYTHROCYTE [SEDIMENTATION RATE] IN BLOOD BY WESTERGREN METHOD: 9 MM/H (ref 1–15)
GFR SERPL CREATININE-BSD FRML MDRD: 65 ML/MIN/1.7M2
GLUCOSE SERPL-MCNC: 87 MG/DL (ref 70–105)
HCT VFR BLD AUTO: 44.9 % (ref 35–47)
HGB BLD-MCNC: 14.6 G/DL (ref 11.7–15.7)
IMM GRANULOCYTES # BLD: 0 10E9/L (ref 0–0.4)
IMM GRANULOCYTES NFR BLD: 0.1 %
LYMPHOCYTES # BLD AUTO: 2.1 10E9/L (ref 0.8–5.3)
LYMPHOCYTES NFR BLD AUTO: 28.2 %
MCH RBC QN AUTO: 30.5 PG (ref 26.5–33)
MCHC RBC AUTO-ENTMCNC: 32.5 G/DL (ref 31.5–36.5)
MCV RBC AUTO: 94 FL (ref 78–100)
MONOCYTES # BLD AUTO: 0.5 10E9/L (ref 0–1.3)
MONOCYTES NFR BLD AUTO: 7.3 %
NEUTROPHILS # BLD AUTO: 4.4 10E9/L (ref 1.6–8.3)
NEUTROPHILS NFR BLD AUTO: 60.1 %
PLATELET # BLD AUTO: 198 10E9/L (ref 150–450)
POTASSIUM SERPL-SCNC: 4.7 MMOL/L (ref 3.5–5.1)
PROT SERPL-MCNC: 7.1 G/DL (ref 6.4–8.9)
RBC # BLD AUTO: 4.79 10E12/L (ref 3.8–5.2)
SODIUM SERPL-SCNC: 141 MMOL/L (ref 134–144)
WBC # BLD AUTO: 7.4 10E9/L (ref 4–11)

## 2018-11-01 PROCEDURE — 36415 COLL VENOUS BLD VENIPUNCTURE: CPT | Performed by: NURSE PRACTITIONER

## 2018-11-01 PROCEDURE — 86141 C-REACTIVE PROTEIN HS: CPT | Performed by: NURSE PRACTITIONER

## 2018-11-01 PROCEDURE — 85025 COMPLETE CBC W/AUTO DIFF WBC: CPT | Performed by: NURSE PRACTITIONER

## 2018-11-01 PROCEDURE — 85652 RBC SED RATE AUTOMATED: CPT | Performed by: NURSE PRACTITIONER

## 2018-11-01 PROCEDURE — 83516 IMMUNOASSAY NONANTIBODY: CPT | Performed by: NURSE PRACTITIONER

## 2018-11-01 PROCEDURE — 80053 COMPREHEN METABOLIC PANEL: CPT | Performed by: NURSE PRACTITIONER

## 2018-11-01 PROCEDURE — 99214 OFFICE O/P EST MOD 30 MIN: CPT | Performed by: NURSE PRACTITIONER

## 2018-11-01 RX ORDER — CALCIUM CARBONATE 300MG(750)
TABLET,CHEWABLE ORAL
COMMUNITY
End: 2019-01-23

## 2018-11-01 ASSESSMENT — PAIN SCALES - GENERAL: PAINLEVEL: MILD PAIN (3)

## 2018-11-01 NOTE — PATIENT INSTRUCTIONS
ASSESSMENT/PLAN:     1. LLQ abdominal pain  Uncertain etiology at this time, will obtain labs today and referral placed for Gastroenterology for further workup as deemed necessary.     - CBC with platelets differential; Future  - Comprehensive metabolic panel; Future  - C-Reactive Protein high sensitivity GH; Future  - Sedimentation Rate (ESR); Future  - GASTROENTEROLOGY ADULT REF CONSULT ONLY  - Tissue transglutaminase Ab IgA and IgG; Future    Complete workup with gastroenterology. If no improvement or worsening of symptoms, return to clinic.     Belkys Hernandez NP  [facility]

## 2018-11-01 NOTE — NURSING NOTE
Patient is here for female issues. States had hysterectomy a year ago in June, during proceduce had colon damage, since then is having pain, burning feeling, food sensitivities. Patient states recently noticed an exercise she was doing was triggering pain. Patient states during spartan never lost any weight.  Tara Kasper LPN .............11/1/2018     1:53 PM      No LMP recorded. Patient has had a hysterectomy.  Medication Reconciliation: complete    Tara Kasper LPN  11/1/2018 2:01 PM

## 2018-11-01 NOTE — MR AVS SNAPSHOT
After Visit Summary   11/1/2018    Kerrie Patel    MRN: [id number]           Patient Information     Date Of Birth          1970        Visit Information        Provider Department      11/1/2018 2:00 PM Belkys Hernandez NP [facility]        Today's Diagnoses     LLQ abdominal pain    -  1      Care Instructions    ASSESSMENT/PLAN:     1. LLQ abdominal pain  Uncertain etiology at this time, will obtain labs today and referral placed for Gastroenterology for further workup as deemed necessary.     - CBC with platelets differential; Future  - Comprehensive metabolic panel; Future  - C-Reactive Protein high sensitivity GH; Future  - Sedimentation Rate (ESR); Future  - GASTROENTEROLOGY ADULT REF CONSULT ONLY  - Tissue transglutaminase Ab IgA and IgG; Future    Complete workup with gastroenterology. If no improvement or worsening of symptoms, return to clinic.     Belkys Hernandez NP  [facility]          Follow-ups after your visit        Additional Services     GASTROENTEROLOGY ADULT REF CONSULT ONLY       Preferred Location: [facility] [phone]      Please be aware that coverage of these services is subject to the terms and limitations of your health insurance plan.  Call member services at your health plan with any benefit or coverage questions.  Any procedures must be performed at a [facility] facility OR coordinated by your clinic's referral office.    Please bring the following with you to your appointment:    (1) Any X-Rays, CTs or MRIs which have been performed.  Contact the facility where they were done to arrange for  prior to your scheduled appointment.    (2) List of current medications   (3) This referral request   (4) Any documents/labs given to you for this referral                  Future tests that were ordered for you today     Open Future Orders        Priority Expected Expires Ordered    CBC with platelets differential Routine   11/1/2019 11/1/2018    Comprehensive metabolic panel Routine 11/1/2018 1/30/2019 11/1/2018    C-Reactive Protein high sensitivity GH Routine  11/1/2019 11/1/2018    Sedimentation Rate (ESR) Routine  11/1/2019 11/1/2018    Tissue transglutaminase Ab IgA and IgG Routine  11/1/2019 11/1/2018            Who to contact     If you have questions or need follow up information about today's clinic visit or your schedule please contact [facility] directly at [phone].  Normal or non-critical lab and imaging results will be communicated to you by MyChart, letter or phone within 4 business days after the clinic has received the results. If you do not hear from us within 7 days, please contact the clinic through MyChart or phone. If you have a critical or abnormal lab result, we will notify you by phone as soon as possible.  Submit refill requests through "[employer] or call your pharmacy and they will forward the refill request to us. Please allow 3 business days for your refill to be completed.          Additional Information About Your Visit        Care EveryWhere ID     This is your Care EveryWhere ID. This could be used by other organizations to access your [facility] medical records  [id number]        Your Vitals Were     Pulse Temperature Respirations Breastfeeding? BMI (Body Mass Index)       84 98.1  F (36.7  C) (Tympanic) 18 No 32.65 kg/m2        Blood Pressure from Last 3 Encounters:   11/01/18 138/82   01/05/18 132/86   08/08/17 128/70    Weight from Last 3 Encounters:   11/01/18 172 lb 12.8 oz (78.4 kg)   01/05/18 165 lb 6.4 oz (75 kg)   08/08/17 162 lb 12.8 oz (73.8 kg)              We Performed the Following     GASTROENTEROLOGY ADULT REF CONSULT ONLY        Primary Care Provider Fax #    Physician No Ref-Primary [phone]       No address on file        Equal Access to Services     SARA GARCIA : Gagan Chandler, segunod delgadillo, letha hunt  brenda socorrosherri mary annreagan laabimael ah. So [facility] [phone].    ATENCIÓN: Si merila dasia, tiene a james disposición servicios gratuitos de asistencia lingüística. Gerald al [phone].    We comply with applicable federal civil rights laws and [place] laws. We do not discriminate on the basis of race, color, national origin, age, disability, sex, sexual orientation, or gender identity.            Thank you!     Thank you for choosing [facility]  for your care. Our goal is always to provide you with excellent care. Hearing back from our patients is one way we can continue to improve our services. Please take a few minutes to complete the written survey that you may receive in the mail after your visit with us. Thank you!             Your Updated Medication List - Protect others around you: Learn how to safely use, store and throw away your medicines at www.disposemymeds.org.          This list is accurate as of 11/1/18  3:00 PM.  Always use your most recent med list.                   Brand Name Dispense Instructions for use Diagnosis    D 88317 52287 units Caps   Generic drug:  Cholecalciferol      Take 10,000 Units by mouth daily        Magnesium 400 MG Tabs           UNABLE TO FIND      MEDICATION NAME: seravital : youthfulness, wrinkles, mood, weight loss        UNABLE TO FIND      MEDICATION NAME: triphala guggula : immune system 600 mg 1 at night

## 2018-11-01 NOTE — LETTER
November 2, 2018      Kerrie Patel  [address]  [facility] [address]        Dear ,    We are writing to inform you of your test results.    All of your test results are in the normal range, indicating there is no inflammatory or infectious process that is the cause of your symptoms at this time. The test for celiac disease has not been returned as of this time. This will give gastroenterology a starting place for what testing they may want to do at your referral. I sure hope they will be able to shed some light on what you are experiencing!    Resulted Orders   CBC with platelets differential   Result Value Ref Range    WBC 7.4 4.0 - 11.0 10e9/L    RBC Count 4.79 3.8 - 5.2 10e12/L    Hemoglobin 14.6 11.7 - 15.7 g/dL    Hematocrit 44.9 35.0 - 47.0 %    MCV 94 78 - 100 fl    MCH 30.5 26.5 - 33.0 pg    MCHC 32.5 31.5 - 36.5 g/dL    RDW 13.1 10.0 - 15.0 %    Platelet Count 198 150 - 450 10e9/L    Diff Method Automated Method     % Neutrophils 60.1 %    % Lymphocytes 28.2 %    % Monocytes 7.3 %    % Eosinophils 3.6 %    % Basophils 0.7 %    % Immature Granulocytes 0.1 %    Absolute Neutrophil 4.4 1.6 - 8.3 10e9/L    Absolute Lymphocytes 2.1 0.8 - 5.3 10e9/L    Absolute Monocytes 0.5 0.0 - 1.3 10e9/L    Absolute Eosinophils 0.3 0.0 - 0.7 10e9/L    Absolute Basophils 0.1 0.0 - 0.2 10e9/L    Abs Immature Granulocytes 0.0 0 - 0.4 10e9/L   Comprehensive metabolic panel   Result Value Ref Range    Sodium 141 134 - 144 mmol/L    Potassium 4.7 3.5 - 5.1 mmol/L    Chloride 106 98 - 107 mmol/L    Carbon Dioxide 29 21 - 31 mmol/L    Anion Gap 6 3 - 14 mmol/L    Glucose 87 70 - 105 mg/dL    Urea Nitrogen 18 7 - 25 mg/dL    Creatinine 0.93 0.60 - 1.20 mg/dL    GFR Estimate 65 >60 mL/min/1.7m2    GFR Estimate If Black 78 >60 mL/min/1.7m2    Calcium 9.7 8.6 - 10.3 mg/dL    Bilirubin Total 0.3 0.3 - 1.0 mg/dL    Albumin 4.3 3.5 - 5.7 g/dL    Protein Total 7.1 6.4 - 8.9 g/dL    Alkaline Phosphatase 73 34 - 104 U/L    ALT 42  7 - 52 U/L    AST 26 13 - 39 U/L   C-Reactive Protein high sensitivity GH   Result Value Ref Range    C-Reactive Protein High Sensitivity 1.385 0.200 - 160.000 mg/L   Sedimentation Rate (ESR)   Result Value Ref Range    Sed Rate 9 1 - 15 mm/h       If you have any questions or concerns, please call the clinic at the number listed above.       Sincerely,        Belkys Hernandez NP

## 2018-11-01 NOTE — PROGRESS NOTES
SUBJECTIVE:   Kerrie Patel is a 48 year old female who presents to clinic today for the following health issues:    ABDOMINAL PAIN     Onset: 2017    Description:   Character: Burning and throbbing  Location: left lower quadrant  Radiation: Did radiate around to lower/mid back, does not any longer    Intensity: severe    Progression of Symptoms:  Intermittent, dependant upon food and activity though does come at random times as well    Accompanying Signs & Symptoms:  Fever/Chills?: no   Gas/Bloating: YES- both, extremely bloated at times  Nausea: YES- occassional  Vomitting: no   Diarrhea?: no   Constipation:YES- once in a great while  Dysuria or Hematuria: no    History:   Trauma: no   Previous similar pain: YES- has been ongoing since lap hyst with pt reported complications regarding L tube and ovary wrapped around intestine that caused some sort of intestinal repair in 2017, OB aware and attributed to scar tissue stretching   Previous tests done: none    Precipitating factors:   Does the pain change with:     Food: YES- has not consumed gluten, corn or soy for the last 6 years; this past July noted pain precipitated by dairy, coffee, oats, rice, peanut butter, chickpea/lentil pasta, and pineapple, has since avoided; in the last 3 months, noted red meat, greens, bananas, and red wine is also a trigger     BM: YES- upon first sensations of needing to have a BM, burning will start and intensify as the urge to defecate builds, after defecation the burning stops    Urination: no     Alleviating factors:  time    Therapies Tried and outcome: supplements, heating pad, ice pack - no difference at all    LMP:  not applicable       Problem list and histories reviewed & adjusted, as indicated.  Additional history: none    Patient Active Problem List   Diagnosis     Family history of diabetes mellitus     Fibrocystic breast disease     Pelvic pain     Past Surgical History:   Procedure Laterality Date       SECTION      96, 99     CHOLECYSTECTOMY      2003     OTHER SURGICAL HISTORY      8/24/2012,PEY292,PREMALIG/BENIGN SKIN LESION EXCISION,BREAST LESION     OTHER SURGICAL HISTORY      6/28/2017,96344.0,ME TLH W TUBES/OVAR 250 G OR LESS       Social History   Substance Use Topics     Smoking status: Never Smoker     Smokeless tobacco: Never Used     Alcohol use No      Comment: rare     Family History   Problem Relation Age of Onset     Diabetes Father      Diabetes,Type 2     Breast Cancer No family hx of      Cancer-breast         Current Outpatient Prescriptions   Medication Sig Dispense Refill     Cholecalciferol (D 53934) 45392 UNITS CAPS Take 10,000 Units by mouth daily       Magnesium 400 MG TABS        UNABLE TO FIND MEDICATION NAME: seravital : youthfulness, wrinkles, mood, weight loss       UNABLE TO FIND MEDICATION NAME: triphala guggula : immune system 600 mg 1 at night       Allergies   Allergen Reactions     Amoxicillin Nausea and Vomiting     Lactose      Other reaction(s): GI Bleeding       Reviewed and updated as needed this visit by clinical staff  Tobacco  Allergies  Meds  Med Hx  Surg Hx  Fam Hx  Soc Hx      Reviewed and updated as needed this visit by Provider  Meds         ROS:  CONSTITUTIONAL:POSITIVE  for fatigue and weight gain and NEGATIVE  for anorexia, arthralgias and myalgias    OBJECTIVE:     /82 (BP Location: Right arm, Patient Position: Sitting, Cuff Size: Adult Regular)  Pulse 84  Temp 98.1  F (36.7  C) (Tympanic)  Resp 18  Wt 172 lb 12.8 oz (78.4 kg)  LMP   Breastfeeding? No  BMI 32.65 kg/m2  Body mass index is 32.65 kg/(m^2).  GENERAL: healthy, alert and no distress  RESP: lungs clear to auscultation - no rales, rhonchi or wheezes  CV: regular rate and rhythm, normal S1 S2, no S3 or S4, no murmur, click or rub, no peripheral edema and peripheral pulses strong  ABDOMEN: tenderness LLQ, bowel sounds normal, no palpable or pulsatile masses and well-healed incision in  umbilicus and RUQ    Diagnostic Test Results:  No results found for this or any previous visit (from the past 24 hour(s)).    ASSESSMENT/PLAN:     1. LLQ abdominal pain  Uncertain etiology at this time, will obtain labs today and referral placed for Gastroenterology for further workup as deemed necessary.     - CBC with platelets differential; Future  - Comprehensive metabolic panel; Future  - C-Reactive Protein high sensitivity GH; Future  - Sedimentation Rate (ESR); Future  - GASTROENTEROLOGY ADULT REF CONSULT ONLY  - Tissue transglutaminase Ab IgA and IgG; Future    Complete workup with gastroenterology. If no improvement or worsening of symptoms, return to clinic.     Belkys Hernandez NP  [facility]

## 2018-11-05 ENCOUNTER — NURSE TRIAGE (OUTPATIENT)
Dept: OBGYN | Facility: OTHER | Age: 48
End: 2018-11-05

## 2018-11-05 ENCOUNTER — TELEPHONE (OUTPATIENT)
Dept: OBGYN | Facility: OTHER | Age: 48
End: 2018-11-05

## 2018-11-05 ENCOUNTER — TELEPHONE (OUTPATIENT)
Dept: FAMILY MEDICINE | Facility: OTHER | Age: 48
End: 2018-11-05

## 2018-11-05 LAB
TTG IGA SER-ACNC: <1 U/ML
TTG IGG SER-ACNC: 1 U/ML

## 2018-11-05 NOTE — TELEPHONE ENCOUNTER
Please call patient and let her know that all of her lab results are normal, I did send out a letter with all results listed that she should be receiving soon. Thank her for history clarification, but at this time I want her to keep her appt with gastroenterology. I do not feel she has diverticulitis (an inflammation of an outpouching of her colon) at this time due to length of time of symptoms, lack of increased white blood cell count (which would indicate infection) and physical exam without very concerning findings. If any other concerning symptoms should develop, she should return to clinic or present to [facility] or ER for evaluation. Thanks!!    Belkys

## 2018-11-05 NOTE — TELEPHONE ENCOUNTER
Kerrie called and said that she is having some complications from her surgery last year and would like to see Dr. Hassan.  She is having quite a bit of pain and is not sure she can wait until his next opening on 11/19/18.  She said that she did see a nurse practitioner and that did not go well.  She would like to talk to the nurse about her symptoms and see if she can get in sooner.  Sue Barton on 11/5/2018 at 3:34 PM

## 2018-11-05 NOTE — TELEPHONE ENCOUNTER
"Spoke to patient and she relayed the following information below:  Experiencing left abd pain 7 tolerable bloating up her back right shoulder blade and chills, Constipation, Nausea, Frequent urination, Decrease appetite because of bloating,eliminated  Dairy out of her diet, denies fever, only able to eat scrambled eggs and chicken soup, and Hands/feet swollen. Patient just saw Belkys Hernandez NP on 11/01/18 and was referred to a gastroenterologist in [place]. States she is having same symptoms as when she saw NP. Since the clinic is almost closed she can either go to the emergency room if her symptoms worsen or since there is a plan in place with Belkys Hernandez NP that would be the best route to take. We are unable to work her in our schedule at this time because her symptoms sound more so GI related. Patient is able to wait and be seen at next available appointment as well. The patient indicates understanding of these issues and agrees with the plan.    Latha Lewis RN on 11/5/2018 at 4:30 PM    Reason for Disposition    [1] MILD (e.g., does not interfere with normal activities) AND [2] pain comes and goes (cramps) AND [3] present > 48 hours    Additional Information    Negative: Shock suspected (e.g., cold/pale/clammy skin, too weak to stand, low BP, rapid pulse)    Negative: Difficult to awaken or acting confused  (e.g., disoriented, slurred speech)    Negative: Passed out (i.e., lost consciousness, collapsed and was not responding)    Negative: Sounds like a life-threatening emergency to the triager    Negative: Chest pain    Negative: Pain is mainly in upper abdomen  (if needed ask: \"is it mainly above the belly button?\")    Negative: Followed an abdomen (stomach) injury    Negative: [1] Abdominal pain AND [2] pregnant < 20 weeks    Negative: [1] Abdominal pain AND [2] pregnant > 20 weeks    Negative: [1] Abdominal pain AND [2] postpartum < 1 month since delivery    Negative: [1] SEVERE pain (e.g., " "excruciating) AND [2] present > 1 hour    Negative: [1] SEVERE pain AND [2] age > 60    Negative: [1] Vomiting AND [2] contains red blood or black (\"coffee ground\") material  (Exception: few red streaks in vomit that only happened once)    Negative: Blood in bowel movements   (Exception: blood on surface of BM with constipation)    Negative: Black or tarry bowel movements  (Exception: chronic-unchanged  black-grey bowel movements AND is taking iron pills or Pepto-bismol)    Negative: Patient sounds very sick or weak to the triager    Negative: [1] MILD-MODERATE pain AND [2] constant AND [3] present > 2 hours    Negative: [1] Vomiting AND [2] abdomen looks much more swollen than usual    Negative: [1] Vomiting AND [2] contains bile (green color)    Negative: White of the eyes have turned yellow (i.e., jaundice)    Negative: Fever > 103 F (39.4 C)    Negative: [1] Fever > 101 F (38.3 C) AND [2] age > 60    Negative: [1] Fever > 101 F (38.3 C) AND [2] bedridden (e.g., nursing home patient, CVA, chronic illness, recovering from surgery)    Negative: [1] Fever > 100.5 F (38.1 C) AND [2] diabetes mellitus or weak immune system (e.g., HIV positive, cancer chemo, splenectomy, organ transplant, chronic steroids)    Negative: [1] SEVERE abdominal pain AND [2] present < 1 hour  (all triage questions negative)    Negative: [1] MODERATE (e.g., interferes with normal activities) AND [2] pain comes and goes (cramps) AND [3] present > 24 hours  (Exception: pain with Vomiting or Diarrhea - see that Guideline)    Protocols used: ABDOMINAL PAIN - FEMALE-ADULT-      "

## 2018-11-05 NOTE — TELEPHONE ENCOUNTER
Family history of father had Diverticulitis age 47 yrs old.  He had a weight gain same as Kerrie.   She is also asking for her lab results.  Norma J. Gosselin LPN......  11/5/2018   9:10 AM

## 2018-11-05 NOTE — TELEPHONE ENCOUNTER
After verifying patient's name and date of birth, patient was given the below information.  She has not heard from gastroenterology yet. New cell number [phone].  Norma J. Gosselin....11/5/2018 10:52 AM

## 2018-11-29 ENCOUNTER — TRANSFERRED RECORDS (OUTPATIENT)
Dept: HEALTH INFORMATION MANAGEMENT | Facility: OTHER | Age: 48
End: 2018-11-29

## 2019-01-17 ENCOUNTER — TRANSFERRED RECORDS (OUTPATIENT)
Dept: HEALTH INFORMATION MANAGEMENT | Facility: CLINIC | Age: 49
End: 2019-01-17

## 2019-01-18 ENCOUNTER — APPOINTMENT (OUTPATIENT)
Dept: MRI IMAGING | Facility: OTHER | Age: 49
End: 2019-01-18
Payer: COMMERCIAL

## 2019-01-18 ENCOUNTER — HOSPITAL ENCOUNTER (EMERGENCY)
Facility: OTHER | Age: 49
Discharge: ANOTHER HOSPITAL | End: 2019-01-18
Attending: PHYSICIAN ASSISTANT | Admitting: PHYSICIAN ASSISTANT
Payer: COMMERCIAL

## 2019-01-18 ENCOUNTER — TRANSFERRED RECORDS (OUTPATIENT)
Dept: HEALTH INFORMATION MANAGEMENT | Facility: OTHER | Age: 49
End: 2019-01-18

## 2019-01-18 VITALS
HEIGHT: 61 IN | TEMPERATURE: 98.2 F | BODY MASS INDEX: 32.77 KG/M2 | WEIGHT: 173.6 LBS | SYSTOLIC BLOOD PRESSURE: 173 MMHG | DIASTOLIC BLOOD PRESSURE: 82 MMHG | OXYGEN SATURATION: 100 % | RESPIRATION RATE: 14 BRPM

## 2019-01-18 DIAGNOSIS — H46.9 OPTIC NEURITIS: ICD-10-CM

## 2019-01-18 LAB
ALBUMIN SERPL-MCNC: 4.1 G/DL (ref 3.5–5.7)
ALP SERPL-CCNC: 68 U/L (ref 34–104)
ALT SERPL W P-5'-P-CCNC: 21 U/L (ref 7–52)
ANION GAP SERPL CALCULATED.3IONS-SCNC: 6 MMOL/L (ref 3–14)
APTT PPP: 34 SEC (ref 26–39)
AST SERPL W P-5'-P-CCNC: 16 U/L (ref 13–39)
BASOPHILS # BLD AUTO: 0 10E9/L (ref 0–0.2)
BASOPHILS NFR BLD AUTO: 0.6 %
BILIRUB SERPL-MCNC: 0.3 MG/DL (ref 0.3–1)
BUN SERPL-MCNC: 17 MG/DL (ref 7–25)
CALCIUM SERPL-MCNC: 9.5 MG/DL (ref 8.6–10.3)
CHLORIDE SERPL-SCNC: 108 MMOL/L (ref 98–107)
CO2 SERPL-SCNC: 28 MMOL/L (ref 21–31)
CREAT SERPL-MCNC: 0.84 MG/DL (ref 0.6–1.2)
DIFFERENTIAL METHOD BLD: NORMAL
EOSINOPHIL # BLD AUTO: 0.2 10E9/L (ref 0–0.7)
EOSINOPHIL NFR BLD AUTO: 2.5 %
ERYTHROCYTE [DISTWIDTH] IN BLOOD BY AUTOMATED COUNT: 13 % (ref 10–15)
ERYTHROCYTE [SEDIMENTATION RATE] IN BLOOD BY WESTERGREN METHOD: 18 MM/H (ref 1–15)
GFR SERPL CREATININE-BSD FRML MDRD: 73 ML/MIN/{1.73_M2}
GLUCOSE SERPL-MCNC: 102 MG/DL (ref 70–105)
HCT VFR BLD AUTO: 44.6 % (ref 35–47)
HGB BLD-MCNC: 14.6 G/DL (ref 11.7–15.7)
IMM GRANULOCYTES # BLD: 0 10E9/L (ref 0–0.4)
IMM GRANULOCYTES NFR BLD: 0.1 %
INR PPP: 1 (ref 0–1.3)
LYMPHOCYTES # BLD AUTO: 1.8 10E9/L (ref 0.8–5.3)
LYMPHOCYTES NFR BLD AUTO: 26.8 %
MCH RBC QN AUTO: 30.4 PG (ref 26.5–33)
MCHC RBC AUTO-ENTMCNC: 32.7 G/DL (ref 31.5–36.5)
MCV RBC AUTO: 93 FL (ref 78–100)
MONOCYTES # BLD AUTO: 0.5 10E9/L (ref 0–1.3)
MONOCYTES NFR BLD AUTO: 7.6 %
NEUTROPHILS # BLD AUTO: 4.2 10E9/L (ref 1.6–8.3)
NEUTROPHILS NFR BLD AUTO: 62.4 %
PLATELET # BLD AUTO: 197 10E9/L (ref 150–450)
POTASSIUM SERPL-SCNC: 4.4 MMOL/L (ref 3.5–5.1)
PROT SERPL-MCNC: 7.4 G/DL (ref 6.4–8.9)
RBC # BLD AUTO: 4.81 10E12/L (ref 3.8–5.2)
SODIUM SERPL-SCNC: 142 MMOL/L (ref 134–144)
WBC # BLD AUTO: 6.7 10E9/L (ref 4–11)

## 2019-01-18 PROCEDURE — 36415 COLL VENOUS BLD VENIPUNCTURE: CPT | Performed by: PHYSICIAN ASSISTANT

## 2019-01-18 PROCEDURE — 96375 TX/PRO/DX INJ NEW DRUG ADDON: CPT | Mod: XU | Performed by: PHYSICIAN ASSISTANT

## 2019-01-18 PROCEDURE — 25000128 H RX IP 250 OP 636: Performed by: PHYSICIAN ASSISTANT

## 2019-01-18 PROCEDURE — 25500064 ZZH RX 255 OP 636: Performed by: PHYSICIAN ASSISTANT

## 2019-01-18 PROCEDURE — 85652 RBC SED RATE AUTOMATED: CPT | Performed by: PHYSICIAN ASSISTANT

## 2019-01-18 PROCEDURE — A9575 INJ GADOTERATE MEGLUMI 0.1ML: HCPCS | Performed by: PHYSICIAN ASSISTANT

## 2019-01-18 PROCEDURE — 85025 COMPLETE CBC W/AUTO DIFF WBC: CPT | Performed by: PHYSICIAN ASSISTANT

## 2019-01-18 PROCEDURE — 85730 THROMBOPLASTIN TIME PARTIAL: CPT | Performed by: PHYSICIAN ASSISTANT

## 2019-01-18 PROCEDURE — 85610 PROTHROMBIN TIME: CPT | Performed by: PHYSICIAN ASSISTANT

## 2019-01-18 PROCEDURE — 96374 THER/PROPH/DIAG INJ IV PUSH: CPT | Performed by: PHYSICIAN ASSISTANT

## 2019-01-18 PROCEDURE — 99285 EMERGENCY DEPT VISIT HI MDM: CPT | Mod: 25 | Performed by: PHYSICIAN ASSISTANT

## 2019-01-18 PROCEDURE — 70553 MRI BRAIN STEM W/O & W/DYE: CPT

## 2019-01-18 PROCEDURE — 80053 COMPREHEN METABOLIC PANEL: CPT | Performed by: PHYSICIAN ASSISTANT

## 2019-01-18 PROCEDURE — 96376 TX/PRO/DX INJ SAME DRUG ADON: CPT | Performed by: PHYSICIAN ASSISTANT

## 2019-01-18 PROCEDURE — 99285 EMERGENCY DEPT VISIT HI MDM: CPT | Mod: Z6 | Performed by: PHYSICIAN ASSISTANT

## 2019-01-18 RX ORDER — METHYLPREDNISOLONE SODIUM SUCCINATE 125 MG/2ML
125 INJECTION, POWDER, LYOPHILIZED, FOR SOLUTION INTRAMUSCULAR; INTRAVENOUS ONCE
Status: COMPLETED | OUTPATIENT
Start: 2019-01-18 | End: 2019-01-18

## 2019-01-18 RX ORDER — DIPHENHYDRAMINE HYDROCHLORIDE 50 MG/ML
25 INJECTION INTRAMUSCULAR; INTRAVENOUS ONCE
Status: COMPLETED | OUTPATIENT
Start: 2019-01-18 | End: 2019-01-18

## 2019-01-18 RX ORDER — TETRACAINE HYDROCHLORIDE 5 MG/ML
1-2 SOLUTION OPHTHALMIC ONCE
Status: DISCONTINUED | OUTPATIENT
Start: 2019-01-18 | End: 2019-01-18 | Stop reason: HOSPADM

## 2019-01-18 RX ORDER — METOCLOPRAMIDE HYDROCHLORIDE 5 MG/ML
10 INJECTION INTRAMUSCULAR; INTRAVENOUS ONCE
Status: COMPLETED | OUTPATIENT
Start: 2019-01-18 | End: 2019-01-18

## 2019-01-18 RX ADMIN — METHYLPREDNISOLONE SODIUM SUCCINATE 125 MG: 125 INJECTION, POWDER, FOR SOLUTION INTRAMUSCULAR; INTRAVENOUS at 11:16

## 2019-01-18 RX ADMIN — METHYLPREDNISOLONE SODIUM SUCCINATE 125 MG: 125 INJECTION, POWDER, FOR SOLUTION INTRAMUSCULAR; INTRAVENOUS at 14:10

## 2019-01-18 RX ADMIN — GADOTERATE MEGLUMINE 15 ML: 376.9 INJECTION INTRAVENOUS at 12:43

## 2019-01-18 RX ADMIN — DIPHENHYDRAMINE HYDROCHLORIDE 25 MG: 50 INJECTION INTRAMUSCULAR; INTRAVENOUS at 11:41

## 2019-01-18 RX ADMIN — METOCLOPRAMIDE 10 MG: 5 INJECTION, SOLUTION INTRAMUSCULAR; INTRAVENOUS at 11:34

## 2019-01-18 ASSESSMENT — ENCOUNTER SYMPTOMS
NUMBNESS: 1
HEADACHES: 1

## 2019-01-18 ASSESSMENT — MIFFLIN-ST. JEOR: SCORE: 1359.82

## 2019-01-18 NOTE — PROGRESS NOTES
[facility] @ [facility] Called to say that there should be an ambulance to our facility in a little over an hour.

## 2019-01-18 NOTE — ED NOTES
Received call from Dr Jonas. Pt is being referred here for a headache for 2 days, and a R pupil is reacting but has RAPD. Pt was not dilated today, but was dilated yesterday. Dr Jonas is concerned about optic neuritis.

## 2019-01-18 NOTE — ED NOTES
Pt with Meds 1 to [facility]. Phone call to significant other to notify him. Ruperto [phone]

## 2019-01-18 NOTE — ED TRIAGE NOTES
Pt reports really intense headache behind R eye and along R temple. She woke up with it Wednesday morning. She has tried 4 Ibuprofen, Excedrin migraine and nothing is helping her pain. Denies recent falls or being on blood thinners. She also has no vision in her R eye. This started Wednesday evening. She was seen by an opthamologist yesterday and he dilated both eyes and referred her to [facility]. She was seen at [facility] today and they referred her to the ER.

## 2019-01-18 NOTE — ED PROVIDER NOTES
History     Chief Complaint   Patient presents with     Loss of Vision     Headache     HPI  Kerrie Patel is a 48 year old female who presents to the ED today with a chief complaint of loss of vision and headache.  Patient reports approximately 2 days ago she woke up with what felt like was a migraine headache.  Short time thereafter she developed loss of vision in her right eye.  Patient also reports some numbness in her right arm and right leg.  Patient has no other complaints.    Allergies:  Allergies   Allergen Reactions     Amoxicillin Nausea and Vomiting     Lactose      Other reaction(s): GI Bleeding       Problem List:    Patient Active Problem List    Diagnosis Date Noted     Family history of diabetes mellitus 2018     Priority: Medium     Pelvic pain 2017     Priority: Medium     Fibrocystic breast disease 2012     Priority: Medium        Past Medical History:    Past Medical History:   Diagnosis Date     Diffuse cystic mastopathy of breast      Family history of diabetes mellitus      Gastro-esophageal reflux disease without esophagitis      Pain in thoracic spine        Past Surgical History:    Past Surgical History:   Procedure Laterality Date      SECTION      96, 99     CHOLECYSTECTOMY      2003     OTHER SURGICAL HISTORY      2012,DWM327,PREMALIG/BENIGN SKIN LESION EXCISION,BREAST LESION     OTHER SURGICAL HISTORY      2017,65001.0,[place] [facility] W TUBES/OVAR 250 G OR LESS       Family History:    Family History   Problem Relation Age of Onset     Diabetes Father         Diabetes,Type 2, Diverticulitis age 47      Breast Cancer No family hx of         Cancer-breast       Social History:  Marital Status:   [2]  Social History     Tobacco Use     Smoking status: Never Smoker     Smokeless tobacco: Never Used   Substance Use Topics     Alcohol use: No     Alcohol/week: 0.0 oz     Comment: rare     Drug use: No     Comment: Drug use: No        Medications:   "    Cholecalciferol (D 43763) 50596 UNITS CAPS   Magnesium 400 MG TABS   UNABLE TO FIND         Review of Systems   Eyes: Positive for visual disturbance.   Neurological: Positive for numbness and headaches.   All other systems reviewed and are negative.      Physical Exam   BP: 173/82  Heart Rate: 65  Temp: 98.2  F (36.8  C)  Resp: 18  Height: 154.9 cm (5' 1\")  Weight: 78.7 kg (173 lb 9.6 oz)  SpO2: 100 %      Physical Exam   Constitutional: She is oriented to person, place, and time. She appears well-developed and well-nourished. No distress.   HENT:   Head: Normocephalic and atraumatic.   Eyes: Conjunctivae are normal. No scleral icterus.   Afferent pupillary defect right eye   Neck: Normal range of motion. Neck supple.   Cardiovascular: Normal rate, regular rhythm and normal heart sounds.   No murmur heard.  Pulmonary/Chest: Effort normal and breath sounds normal. No respiratory distress.   Abdominal: Soft. There is no tenderness.   Musculoskeletal: Normal range of motion. She exhibits no tenderness or deformity.   Lymphadenopathy:     She has no cervical adenopathy.   Neurological: She is alert and oriented to person, place, and time. Coordination normal.   Skin: Skin is warm and dry. No rash noted. She is not diaphoretic.   Psychiatric: She has a normal mood and affect. Her behavior is normal. Judgment and thought content normal.       ED Course        Procedures               Critical Care time:  none               Results for orders placed or performed during the hospital encounter of 01/18/19 (from the past 24 hour(s))   CBC with platelets differential   Result Value Ref Range    WBC 6.7 4.0 - 11.0 10e9/L    RBC Count 4.81 3.8 - 5.2 10e12/L    Hemoglobin 14.6 11.7 - 15.7 g/dL    Hematocrit 44.6 35.0 - 47.0 %    MCV 93 78 - 100 fl    MCH 30.4 26.5 - 33.0 pg    MCHC 32.7 31.5 - 36.5 g/dL    RDW 13.0 10.0 - 15.0 %    Platelet Count 197 150 - 450 10e9/L    Diff Method Automated Method     % Neutrophils 62.4 % "    % Lymphocytes 26.8 %    % Monocytes 7.6 %    % Eosinophils 2.5 %    % Basophils 0.6 %    % Immature Granulocytes 0.1 %    Absolute Neutrophil 4.2 1.6 - 8.3 10e9/L    Absolute Lymphocytes 1.8 0.8 - 5.3 10e9/L    Absolute Monocytes 0.5 0.0 - 1.3 10e9/L    Absolute Eosinophils 0.2 0.0 - 0.7 10e9/L    Absolute Basophils 0.0 0.0 - 0.2 10e9/L    Abs Immature Granulocytes 0.0 0 - 0.4 10e9/L   Comprehensive metabolic panel   Result Value Ref Range    Sodium 142 134 - 144 mmol/L    Potassium 4.4 3.5 - 5.1 mmol/L    Chloride 108 (H) 98 - 107 mmol/L    Carbon Dioxide 28 21 - 31 mmol/L    Anion Gap 6 3 - 14 mmol/L    Glucose 102 70 - 105 mg/dL    Urea Nitrogen 17 7 - 25 mg/dL    Creatinine 0.84 0.60 - 1.20 mg/dL    GFR Estimate 73 >60 mL/min/[1.73_m2]    GFR Estimate If Black 88 >60 mL/min/[1.73_m2]    Calcium 9.5 8.6 - 10.3 mg/dL    Bilirubin Total 0.3 0.3 - 1.0 mg/dL    Albumin 4.1 3.5 - 5.7 g/dL    Protein Total 7.4 6.4 - 8.9 g/dL    Alkaline Phosphatase 68 34 - 104 U/L    ALT 21 7 - 52 U/L    AST 16 13 - 39 U/L   Erythrocyte sedimentation rate auto   Result Value Ref Range    Sed Rate 18 (H) 1 - 15 mm/h   INR   Result Value Ref Range    INR 1.00 0 - 1.3   Partial thromboplastin time   Result Value Ref Range    PTT 34 26 - 39 sec   MR Brain w/o & w Contrast    Narrative    MR BRAIN W/O & W CONTRAST    HISTORY: 48 years Female Multiple sclerosis, new neurological event;  headache/vision loss of right eye, afferent pupillary defect, optic  neuritis? MS plaques?    COMPARISON: None    TECHNIQUE: Routine multisequence and multiplanar MRI imaging of the  brain was performed with contrast. Multisequence multi planar MR  imaging of the orbits was performed.    FINDINGS    The cervical medullary junction is normal in position. There is no  evidence of a sellar or suprasellar mass. There is no mass effect upon  the optic chiasm.  No diffusion signal abnormalities are present suggest an acute or  subacute infarct.  Ventricles and  sulci are symmetric without midline shift or mass  effect.  There is no evidence of intracranial mass or mass effect.  A few very  small nonspecific white matter signal abnormalities are seen. Most of  these are in the frontal lobes.    There is enhancement within the right optic nerve, see series 20  images 8 through 10 and series 22 images 10 through 15 there is slight  perineural edema within the retrobulbar fat.    The extraocular eye muscles are symmetric.    No abnormal enhancement is otherwise seen in the brain or extra-axial  spaces.      Impression    IMPRESSION: Abnormal enhancement of the retrobulbar, intraorbital  segment of the right optic nerve compatible with optic neuritis.    JAMES BROWN MD       Medications   tetracaine (PONTOCAINE) 0.5 % ophthalmic solution 1-2 drop (1 drop Both Eyes Not Given 1/18/19 1130)   methylPREDNISolone sodium succinate (solu-MEDROL) injection 125 mg (125 mg Intravenous Given 1/18/19 1116)   metoclopramide (REGLAN) injection 10 mg (10 mg Intravenous Given 1/18/19 1134)   diphenhydrAMINE (BENADRYL) injection 25 mg (25 mg Intravenous Given 1/18/19 1141)   gadoterate meglumine (DOTAREM) injection 15 mL (15 mLs Intravenous Given 1/18/19 1243)   methylPREDNISolone sodium succinate (solu-MEDROL) injection 125 mg (125 mg Intravenous Given 1/18/19 1410)       Assessments & Plan (with Medical Decision Making)   Patient seen and examined.  Patient is nontoxic-appearing but in slight distress due to discomfort.  Heart, lung, bowel sounds normal.  Abdomen soft nontender palpation, nondistended.  Patient had a positive a Farrand pupillary defect on the right side.  Patient could see only hand motions at 6 inches from her right eye.  Patient had good range of motion in all directions of her eyes.  Patient was neurologically intact but complained of right arm and right leg numbness.  At this time I am concerned for possible giant cell arteritis, optic neuritis, and although  considering acute angle-closure glaucoma I have less concern about this.  Patient will be treated with Solu-Medrol, Reglan, Benadryl for headache.  Patient will be taken for an MRI of the brain and orbits.    MRI shows probable optic neuritis.  Dr. De La Cruz, neurology at [facility] was consulted.  He recommended the patient be admitted for high-dose IV steroid treatment.  I discussed the patient with Dr. Doan, [facility] hospitalist.  He would prefer the patient be in a facility with neurology capabilities.  I discussed options with the patient, she agreed that she would like to be treated as an inpatient at [facility].  Patient remained hemodynamically stable while in the ED.  Patient was transferred.    David Zepeda PA-C        I have reviewed the nursing notes.    I have reviewed the findings, diagnosis, plan and need for follow up with the patient.          Medication List      There are no discharge medications for this visit.         Final diagnoses:   Optic neuritis       1/18/2019   [facility]     David Zepeda PA  01/18/19 1852

## 2019-01-19 ENCOUNTER — TRANSFERRED RECORDS (OUTPATIENT)
Dept: HEALTH INFORMATION MANAGEMENT | Facility: OTHER | Age: 49
End: 2019-01-19

## 2019-01-23 ENCOUNTER — APPOINTMENT (OUTPATIENT)
Dept: ULTRASOUND IMAGING | Facility: OTHER | Age: 49
End: 2019-01-23
Payer: COMMERCIAL

## 2019-01-23 ENCOUNTER — APPOINTMENT (OUTPATIENT)
Dept: PHYSICAL THERAPY | Facility: OTHER | Age: 49
End: 2019-01-23
Payer: COMMERCIAL

## 2019-01-23 ENCOUNTER — APPOINTMENT (OUTPATIENT)
Dept: CT IMAGING | Facility: OTHER | Age: 49
End: 2019-01-23
Payer: COMMERCIAL

## 2019-01-23 ENCOUNTER — HOSPITAL ENCOUNTER (INPATIENT)
Facility: OTHER | Age: 49
LOS: 2 days | Discharge: HOME | End: 2019-01-25
Attending: STUDENT IN AN ORGANIZED HEALTH CARE EDUCATION/TRAINING PROGRAM | Admitting: FAMILY MEDICINE
Payer: COMMERCIAL

## 2019-01-23 DIAGNOSIS — K85.30 DRUG INDUCED ACUTE PANCREATITIS WITHOUT NECROSIS OR INFECTION: ICD-10-CM

## 2019-01-23 DIAGNOSIS — K29.70 GASTRITIS WITHOUT BLEEDING, UNSPECIFIED CHRONICITY, UNSPECIFIED GASTRITIS TYPE: ICD-10-CM

## 2019-01-23 DIAGNOSIS — K29.00 ACUTE GASTRITIS WITHOUT HEMORRHAGE, UNSPECIFIED GASTRITIS TYPE: ICD-10-CM

## 2019-01-23 DIAGNOSIS — H46.9 OPTIC NEURITIS: ICD-10-CM

## 2019-01-23 PROBLEM — K85.90 ACUTE PANCREATITIS: Status: ACTIVE | Noted: 2019-01-23

## 2019-01-23 PROBLEM — E73.9 LACTOSE INTOLERANCE IN ADULT: Status: ACTIVE | Noted: 2019-01-23

## 2019-01-23 PROBLEM — K59.01 SLOW TRANSIT CONSTIPATION: Status: ACTIVE | Noted: 2019-01-23

## 2019-01-23 LAB
ALBUMIN SERPL-MCNC: 3.4 G/DL (ref 3.5–5.7)
ALP SERPL-CCNC: 57 U/L (ref 34–104)
ALT SERPL W P-5'-P-CCNC: 14 U/L (ref 7–52)
AMYLASE SERPL-CCNC: 252 U/L (ref 29–103)
ANION GAP SERPL CALCULATED.3IONS-SCNC: 8 MMOL/L (ref 3–14)
AST SERPL W P-5'-P-CCNC: 10 U/L (ref 13–39)
BILIRUB SERPL-MCNC: 0.4 MG/DL (ref 0.3–1)
BUN SERPL-MCNC: 20 MG/DL (ref 7–25)
CALCIUM SERPL-MCNC: 8.8 MG/DL (ref 8.6–10.3)
CHLORIDE SERPL-SCNC: 104 MMOL/L (ref 98–107)
CHOLEST SERPL-MCNC: 131 MG/DL
CO2 SERPL-SCNC: 24 MMOL/L (ref 21–31)
CREAT SERPL-MCNC: 0.68 MG/DL (ref 0.6–1.2)
DIFFERENTIAL METHOD BLD: ABNORMAL
ERYTHROCYTE [DISTWIDTH] IN BLOOD BY AUTOMATED COUNT: 12.7 % (ref 10–15)
GFR SERPL CREATININE-BSD FRML MDRD: >90 ML/MIN/{1.73_M2}
GLUCOSE SERPL-MCNC: 131 MG/DL (ref 70–105)
HBA1C MFR BLD: 5.7 % (ref 4–6)
HCT VFR BLD AUTO: 39.1 % (ref 35–47)
HDLC SERPL-MCNC: 46 MG/DL (ref 23–92)
HGB BLD-MCNC: 13.6 G/DL (ref 11.7–15.7)
LACTATE SERPL-SCNC: 0.5 MMOL/L (ref 0.5–2.2)
LDLC SERPL CALC-MCNC: 68 MG/DL
LIPASE SERPL-CCNC: 1323 U/L (ref 11–82)
MCH RBC QN AUTO: 31 PG (ref 26.5–33)
MCHC RBC AUTO-ENTMCNC: 34.8 G/DL (ref 31.5–36.5)
MCV RBC AUTO: 89 FL (ref 78–100)
NONHDLC SERPL-MCNC: 85 MG/DL
PLATELET # BLD AUTO: 173 10E9/L (ref 150–450)
POTASSIUM SERPL-SCNC: 4.4 MMOL/L (ref 3.5–5.1)
PROT SERPL-MCNC: 5.9 G/DL (ref 6.4–8.9)
RBC # BLD AUTO: 4.39 10E12/L (ref 3.8–5.2)
SODIUM SERPL-SCNC: 136 MMOL/L (ref 134–144)
TRIGL SERPL-MCNC: 85 MG/DL
WBC # BLD AUTO: 11.4 10E9/L (ref 4–11)

## 2019-01-23 PROCEDURE — 80053 COMPREHEN METABOLIC PANEL: CPT | Performed by: STUDENT IN AN ORGANIZED HEALTH CARE EDUCATION/TRAINING PROGRAM

## 2019-01-23 PROCEDURE — 25000128 H RX IP 250 OP 636: Performed by: FAMILY MEDICINE

## 2019-01-23 PROCEDURE — 97161 PT EVAL LOW COMPLEX 20 MIN: CPT | Mod: GP

## 2019-01-23 PROCEDURE — 12000000 ZZH R&B MED SURG/OB

## 2019-01-23 PROCEDURE — 74176 CT ABD & PELVIS W/O CONTRAST: CPT | Mod: TC

## 2019-01-23 PROCEDURE — 96374 THER/PROPH/DIAG INJ IV PUSH: CPT | Performed by: STUDENT IN AN ORGANIZED HEALTH CARE EDUCATION/TRAINING PROGRAM

## 2019-01-23 PROCEDURE — 97530 THERAPEUTIC ACTIVITIES: CPT | Mod: GP

## 2019-01-23 PROCEDURE — 85025 COMPLETE CBC W/AUTO DIFF WBC: CPT | Performed by: STUDENT IN AN ORGANIZED HEALTH CARE EDUCATION/TRAINING PROGRAM

## 2019-01-23 PROCEDURE — 82150 ASSAY OF AMYLASE: CPT | Performed by: STUDENT IN AN ORGANIZED HEALTH CARE EDUCATION/TRAINING PROGRAM

## 2019-01-23 PROCEDURE — 96361 HYDRATE IV INFUSION ADD-ON: CPT | Performed by: STUDENT IN AN ORGANIZED HEALTH CARE EDUCATION/TRAINING PROGRAM

## 2019-01-23 PROCEDURE — 25000132 ZZH RX MED GY IP 250 OP 250 PS 637: Performed by: STUDENT IN AN ORGANIZED HEALTH CARE EDUCATION/TRAINING PROGRAM

## 2019-01-23 PROCEDURE — 25000132 ZZH RX MED GY IP 250 OP 250 PS 637: Performed by: FAMILY MEDICINE

## 2019-01-23 PROCEDURE — 99222 1ST HOSP IP/OBS MODERATE 55: CPT | Mod: AI | Performed by: FAMILY MEDICINE

## 2019-01-23 PROCEDURE — 36415 COLL VENOUS BLD VENIPUNCTURE: CPT | Performed by: STUDENT IN AN ORGANIZED HEALTH CARE EDUCATION/TRAINING PROGRAM

## 2019-01-23 PROCEDURE — 74177 CT ABD & PELVIS W/CONTRAST: CPT | Mod: TC

## 2019-01-23 PROCEDURE — 80061 LIPID PANEL: CPT | Performed by: STUDENT IN AN ORGANIZED HEALTH CARE EDUCATION/TRAINING PROGRAM

## 2019-01-23 PROCEDURE — 83036 HEMOGLOBIN GLYCOSYLATED A1C: CPT | Performed by: FAMILY MEDICINE

## 2019-01-23 PROCEDURE — 76705 ECHO EXAM OF ABDOMEN: CPT | Mod: TC

## 2019-01-23 PROCEDURE — 25000128 H RX IP 250 OP 636: Performed by: STUDENT IN AN ORGANIZED HEALTH CARE EDUCATION/TRAINING PROGRAM

## 2019-01-23 PROCEDURE — 99285 EMERGENCY DEPT VISIT HI MDM: CPT | Mod: 25 | Performed by: STUDENT IN AN ORGANIZED HEALTH CARE EDUCATION/TRAINING PROGRAM

## 2019-01-23 PROCEDURE — 83605 ASSAY OF LACTIC ACID: CPT | Performed by: STUDENT IN AN ORGANIZED HEALTH CARE EDUCATION/TRAINING PROGRAM

## 2019-01-23 PROCEDURE — 40000193 ZZH STATISTIC PT WARD VISIT

## 2019-01-23 PROCEDURE — 25000131 ZZH RX MED GY IP 250 OP 636 PS 637: Performed by: STUDENT IN AN ORGANIZED HEALTH CARE EDUCATION/TRAINING PROGRAM

## 2019-01-23 PROCEDURE — 96372 THER/PROPH/DIAG INJ SC/IM: CPT | Performed by: STUDENT IN AN ORGANIZED HEALTH CARE EDUCATION/TRAINING PROGRAM

## 2019-01-23 PROCEDURE — 83690 ASSAY OF LIPASE: CPT | Performed by: STUDENT IN AN ORGANIZED HEALTH CARE EDUCATION/TRAINING PROGRAM

## 2019-01-23 PROCEDURE — 99285 EMERGENCY DEPT VISIT HI MDM: CPT | Mod: Z6 | Performed by: STUDENT IN AN ORGANIZED HEALTH CARE EDUCATION/TRAINING PROGRAM

## 2019-01-23 RX ORDER — SODIUM CHLORIDE 9 MG/ML
1000 INJECTION, SOLUTION INTRAVENOUS CONTINUOUS
Status: DISCONTINUED | OUTPATIENT
Start: 2019-01-23 | End: 2019-01-23

## 2019-01-23 RX ORDER — PROCHLORPERAZINE 25 MG
25 SUPPOSITORY, RECTAL RECTAL EVERY 12 HOURS PRN
Status: DISCONTINUED | OUTPATIENT
Start: 2019-01-23 | End: 2019-01-25 | Stop reason: HOSPADM

## 2019-01-23 RX ORDER — SODIUM CHLORIDE AND POTASSIUM CHLORIDE 150; 900 MG/100ML; MG/100ML
INJECTION, SOLUTION INTRAVENOUS CONTINUOUS
Status: DISCONTINUED | OUTPATIENT
Start: 2019-01-23 | End: 2019-01-25

## 2019-01-23 RX ORDER — ONDANSETRON 2 MG/ML
4 INJECTION INTRAMUSCULAR; INTRAVENOUS EVERY 6 HOURS PRN
Status: DISCONTINUED | OUTPATIENT
Start: 2019-01-23 | End: 2019-01-25 | Stop reason: HOSPADM

## 2019-01-23 RX ORDER — MORPHINE SULFATE 2 MG/ML
2 INJECTION, SOLUTION INTRAMUSCULAR; INTRAVENOUS
Status: DISCONTINUED | OUTPATIENT
Start: 2019-01-23 | End: 2019-01-23

## 2019-01-23 RX ORDER — METOCLOPRAMIDE HYDROCHLORIDE 5 MG/ML
10 INJECTION INTRAMUSCULAR; INTRAVENOUS EVERY 6 HOURS PRN
Status: DISCONTINUED | OUTPATIENT
Start: 2019-01-23 | End: 2019-01-25 | Stop reason: HOSPADM

## 2019-01-23 RX ORDER — HYDROMORPHONE HYDROCHLORIDE 1 MG/ML
0.5 INJECTION, SOLUTION INTRAMUSCULAR; INTRAVENOUS; SUBCUTANEOUS
Status: DISCONTINUED | OUTPATIENT
Start: 2019-01-23 | End: 2019-01-25

## 2019-01-23 RX ORDER — METOCLOPRAMIDE 10 MG/1
10 TABLET ORAL EVERY 6 HOURS PRN
Status: DISCONTINUED | OUTPATIENT
Start: 2019-01-23 | End: 2019-01-25 | Stop reason: HOSPADM

## 2019-01-23 RX ORDER — OXYCODONE AND ACETAMINOPHEN 5; 325 MG/1; MG/1
1-2 TABLET ORAL EVERY 4 HOURS PRN
Status: DISCONTINUED | OUTPATIENT
Start: 2019-01-23 | End: 2019-01-25

## 2019-01-23 RX ORDER — PANTOPRAZOLE SODIUM 40 MG/1
40 TABLET, DELAYED RELEASE ORAL
Status: DISCONTINUED | OUTPATIENT
Start: 2019-01-23 | End: 2019-01-25 | Stop reason: HOSPADM

## 2019-01-23 RX ORDER — NALOXONE HYDROCHLORIDE 0.4 MG/ML
.1-.4 INJECTION, SOLUTION INTRAMUSCULAR; INTRAVENOUS; SUBCUTANEOUS
Status: DISCONTINUED | OUTPATIENT
Start: 2019-01-23 | End: 2019-01-25 | Stop reason: HOSPADM

## 2019-01-23 RX ORDER — PROCHLORPERAZINE MALEATE 10 MG
10 TABLET ORAL EVERY 6 HOURS PRN
Status: DISCONTINUED | OUTPATIENT
Start: 2019-01-23 | End: 2019-01-25 | Stop reason: HOSPADM

## 2019-01-23 RX ORDER — POLYETHYLENE GLYCOL 3350 17 G/17G
1 POWDER, FOR SOLUTION ORAL 2 TIMES DAILY
COMMUNITY
End: 2019-02-06

## 2019-01-23 RX ORDER — ONDANSETRON 4 MG/1
4 TABLET, ORALLY DISINTEGRATING ORAL ONCE
Status: COMPLETED | OUTPATIENT
Start: 2019-01-23 | End: 2019-01-23

## 2019-01-23 RX ORDER — ONDANSETRON 2 MG/ML
4 INJECTION INTRAMUSCULAR; INTRAVENOUS ONCE
Status: COMPLETED | OUTPATIENT
Start: 2019-01-23 | End: 2019-01-23

## 2019-01-23 RX ORDER — KETOROLAC TROMETHAMINE 30 MG/ML
30 INJECTION, SOLUTION INTRAMUSCULAR; INTRAVENOUS EVERY 6 HOURS PRN
Status: DISCONTINUED | OUTPATIENT
Start: 2019-01-23 | End: 2019-01-25 | Stop reason: HOSPADM

## 2019-01-23 RX ORDER — ONDANSETRON 4 MG/1
4 TABLET, ORALLY DISINTEGRATING ORAL EVERY 6 HOURS PRN
Status: DISCONTINUED | OUTPATIENT
Start: 2019-01-23 | End: 2019-01-25 | Stop reason: HOSPADM

## 2019-01-23 RX ORDER — KETOROLAC TROMETHAMINE 30 MG/ML
30 INJECTION, SOLUTION INTRAMUSCULAR; INTRAVENOUS ONCE
Status: COMPLETED | OUTPATIENT
Start: 2019-01-23 | End: 2019-01-23

## 2019-01-23 RX ORDER — LIDOCAINE 40 MG/G
CREAM TOPICAL
Status: DISCONTINUED | OUTPATIENT
Start: 2019-01-23 | End: 2019-01-25 | Stop reason: HOSPADM

## 2019-01-23 RX ORDER — POLYETHYLENE GLYCOL 3350 17 G/17G
17 POWDER, FOR SOLUTION ORAL 2 TIMES DAILY
Status: DISCONTINUED | OUTPATIENT
Start: 2019-01-23 | End: 2019-01-25 | Stop reason: HOSPADM

## 2019-01-23 RX ADMIN — OXYCODONE HYDROCHLORIDE AND ACETAMINOPHEN 2 TABLET: 5; 325 TABLET ORAL at 21:30

## 2019-01-23 RX ADMIN — OXYCODONE HYDROCHLORIDE AND ACETAMINOPHEN 1 TABLET: 5; 325 TABLET ORAL at 11:44

## 2019-01-23 RX ADMIN — PANTOPRAZOLE SODIUM 40 MG: 40 TABLET, DELAYED RELEASE ORAL at 09:23

## 2019-01-23 RX ADMIN — ONDANSETRON 4 MG: 4 TABLET, ORALLY DISINTEGRATING ORAL at 01:48

## 2019-01-23 RX ADMIN — KETOROLAC TROMETHAMINE 30 MG: 30 INJECTION, SOLUTION INTRAMUSCULAR at 12:49

## 2019-01-23 RX ADMIN — SODIUM CHLORIDE 1000 ML: 900 INJECTION, SOLUTION INTRAVENOUS at 05:37

## 2019-01-23 RX ADMIN — SODIUM CHLORIDE AND POTASSIUM CHLORIDE: 9; 1.49 INJECTION, SOLUTION INTRAVENOUS at 19:28

## 2019-01-23 RX ADMIN — POLYETHYLENE GLYCOL (3350) 17 G: 17 POWDER, FOR SOLUTION ORAL at 21:30

## 2019-01-23 RX ADMIN — POLYETHYLENE GLYCOL (3350) 17 G: 17 POWDER, FOR SOLUTION ORAL at 09:23

## 2019-01-23 RX ADMIN — SODIUM CHLORIDE AND POTASSIUM CHLORIDE: 9; 1.49 INJECTION, SOLUTION INTRAVENOUS at 09:22

## 2019-01-23 RX ADMIN — SALINE 1 ENEMA: 7; 19 ENEMA RECTAL at 02:58

## 2019-01-23 RX ADMIN — MAGNESIUM HYDROXIDE 30 ML: 400 SUSPENSION ORAL at 01:48

## 2019-01-23 RX ADMIN — SODIUM CHLORIDE 1000 ML: 900 INJECTION, SOLUTION INTRAVENOUS at 06:43

## 2019-01-23 RX ADMIN — KETOROLAC TROMETHAMINE 30 MG: 30 INJECTION, SOLUTION INTRAMUSCULAR at 03:33

## 2019-01-23 RX ADMIN — OXYCODONE HYDROCHLORIDE AND ACETAMINOPHEN 1 TABLET: 5; 325 TABLET ORAL at 09:23

## 2019-01-23 RX ADMIN — ONDANSETRON 4 MG: 2 INJECTION INTRAMUSCULAR; INTRAVENOUS at 05:41

## 2019-01-23 RX ADMIN — HYDROMORPHONE HYDROCHLORIDE 0.5 MG: 1 INJECTION, SOLUTION INTRAMUSCULAR; INTRAVENOUS; SUBCUTANEOUS at 12:09

## 2019-01-23 ASSESSMENT — MIFFLIN-ST. JEOR
SCORE: 1413.38
SCORE: 1359.82

## 2019-01-23 ASSESSMENT — ACTIVITIES OF DAILY LIVING (ADL)
AMBULATION: 0-->INDEPENDENT
FALL_HISTORY_WITHIN_LAST_SIX_MONTHS: NO
TOILETING: 0-->INDEPENDENT
SWALLOWING: 0-->SWALLOWS FOODS/LIQUIDS WITHOUT DIFFICULTY
RETIRED_COMMUNICATION: 0-->UNDERSTANDS/COMMUNICATES WITHOUT DIFFICULTY
DRESS: 0-->INDEPENDENT
ADLS_ACUITY_SCORE: 11
COGNITION: 0 - NO COGNITION ISSUES REPORTED
TRANSFERRING: 0-->INDEPENDENT
BATHING: 0-->INDEPENDENT
ADLS_ACUITY_SCORE: 11
RETIRED_EATING: 0-->INDEPENDENT
ADLS_ACUITY_SCORE: 11

## 2019-01-23 ASSESSMENT — ENCOUNTER SYMPTOMS
BLOOD IN STOOL: 0
CONSTIPATION: 1
FEVER: 0
ABDOMINAL PAIN: 1
DYSURIA: 0
ABDOMINAL DISTENTION: 1
SHORTNESS OF BREATH: 0

## 2019-01-23 NOTE — PHARMACY-ADMISSION MEDICATION HISTORY
Pharmacy -- Admission Medication Reconciliation    Prior to admission (PTA) medications were reviewed and the patient's PTA medication list was updated.    Sources Consulted: patient    The reliability of this Medication Reconciliation is: Reliability: Reliable    The following significant changes were made:  Only home medication is Miralax. No longer using vitamins/supplements.    In addition, the patient's allergies were reviewed with the patient and updated as follows:   Allergies: Amoxicillin and Lactose    The pharmacist has reviewed with the patient that all personal medications should be removed from the building or locked in the belongings safe.  Patient shall only take medications ordered by the physician and administered by the nursing staff.       Medication barriers identified: none   Medication adherence concerns: none   Understanding of emergency medications: n/a    Laurie Smith [facility], 1/23/2019,  8:02 AM

## 2019-01-23 NOTE — ED PROVIDER NOTES
History     Chief Complaint   Patient presents with     Constipation     Abdominal Pain     HPI  Kerrie Patel is a 48 year old female presents with constipation associated with abdominal pain, nausea and vomiting. She was recently discharged form [facility] 3 days ago where she was admitted and managed for optic neuritis, using high dose steroids. She reports improved vision but not completely back to base line. She developed difficulty passing stool while on admission and was prescribed MiraLAX and Colace before discharge. She however has only been taking a spoon of MiraLAX in 1 liter of water daily. Her last bowel movement was 6 days ago but she had 4 pallets of stool today after straining for a while. She denies fever or chills. She had one episode of vomiting on her way to the ER and reports nausea. She reports feeling bloated and has generalized abdominal pain which she states radiates to her back and her legs.      Allergies:  Allergies   Allergen Reactions     Amoxicillin Nausea and Vomiting     Lactose      Other reaction(s): GI Bleeding       Problem List:    Patient Active Problem List    Diagnosis Date Noted     Family history of diabetes mellitus 2018     Priority: Medium     Pelvic pain 2017     Priority: Medium     Fibrocystic breast disease 2012     Priority: Medium        Past Medical History:    Past Medical History:   Diagnosis Date     Diffuse cystic mastopathy of breast      Family history of diabetes mellitus      Gastro-esophageal reflux disease without esophagitis      Pain in thoracic spine        Past Surgical History:    Past Surgical History:   Procedure Laterality Date      SECTION      96, 99     CHOLECYSTECTOMY           OTHER SURGICAL HISTORY      2012,AMX861,PREMALIG/BENIGN SKIN LESION EXCISION,BREAST LESION     OTHER SURGICAL HISTORY      2017,72581.0,[place] TLH W TUBES/OVAR 250 G OR LESS       Family History:    Family History   Problem  "Relation Age of Onset     Diabetes Father         Diabetes,Type 2, Diverticulitis age 47      Breast Cancer No family hx of         Cancer-breast       Social History:  Marital Status:   [2]  Social History     Tobacco Use     Smoking status: Never Smoker     Smokeless tobacco: Never Used   Substance Use Topics     Alcohol use: No     Alcohol/week: 0.0 oz     Comment: rare     Drug use: No     Comment: Drug use: No        Medications:      Cholecalciferol (D 42118) 63367 UNITS CAPS   Magnesium 400 MG TABS   UNABLE TO FIND         Review of Systems   Constitutional: Negative for fever.   HENT: Negative for congestion.    Eyes: Positive for visual disturbance.   Respiratory: Negative for shortness of breath.    Cardiovascular: Negative for chest pain.   Gastrointestinal: Positive for abdominal distention, abdominal pain and constipation. Negative for blood in stool.   Genitourinary: Negative for dysuria.       Physical Exam   BP: 102/49  Pulse: 78  Temp: 99.8  F (37.7  C)  Resp: 16  Height: 154.9 cm (5' 1\")  Weight: 78.7 kg (173 lb 9.6 oz)  SpO2: 97 %      Physical Exam   Constitutional: She is oriented to person, place, and time. She appears well-developed. She appears distressed.   HENT:   Head: Normocephalic.   Eyes: Pupils are equal, round, and reactive to light.   Cardiovascular: Normal rate and regular rhythm.   Pulmonary/Chest: Effort normal.   Abdominal: Soft. She exhibits no distension. There is tenderness. There is no rebound and no guarding.   Generalized tenderness worse on LLQ without rebound or guarding.    Neurological: She is alert and oriented to person, place, and time.       ED Course     Procedures    Critical Care time:  none  Results for orders placed or performed during the hospital encounter of 01/23/19 (from the past 24 hour(s))   CT Abdomen Pelvis w Contrast    Narrative    EXAM:    CT Abdomen and Pelvis With Contrast     EXAM DATE/TIME:    1/23/2019 3:35 AM     CLINICAL HISTORY:    48 " years old, female; Pain; Abdominal pain; Generalized; Prior surgery;   Surgery date: 6+ months; Surgery type: Cholecystectomy. ; Patient HX:   Abdominal pain and nausea and vomiting. Has HX of colitis and bowel issues.   Hasn't had a good bm since last week. ** patient was scanned approximately 40   minutes after drinking the oral contrast per request of ordering physician. ;   Additional info: Abd pain, gastroenteritis or colitis suspected     TECHNIQUE:    Axial computed tomography images of the abdomen and pelvis with intravenous   contrast.    All CT scans at this facility use at least one of these dose optimization   techniques: automated exposure control; mA and/or kV adjustment per patient   size (includes targeted exams where dose is matched to clinical indication); or   iterative reconstruction.    Coronal and sagittal reformatted images were created and reviewed.     CONTRAST:    50 ml of Omni 240 administered intravenously.     COMPARISON:    CT ABDOMEN PELVIS W 2017 2:55 PM     FINDINGS:     PANCREATICOHEPATOBILIARY: The liver is normal without a focal intrahepatic   mass or abnormality. No significant intra-or extrahepatic ductal dilation.   Spleen is unremarkable. Cholecystectomy.     Mild diffuse enlargement of the pancreas with mild peripancreatic fat   stranding extending into the anterior pararenal space without evidence of a   pseudocyst.     GENITOURINARY: No adrenal mass. Kidneys are unremarkable without obstructive   renal/ureteric calculi and no hydronephrosis. Urinary bladder is within normal   limits. Uterus is not visualized consistent with hysterectomy. No free fluid in   the pelvis.     GASTROINTESTINAL: A few colonic diverticula. Colon contains moderate amount   of fecal matter suggestive of constipation. Wall thickening of the distal   thoracic esophagus suggestive of esophagitis/reflux. Wall-mucosal thickening of   the stomach. No free intraperitoneal air or fluid  collection. A normal APPENDIX   is visualized.     OTHER STRUCTURES: Aorta appears unremarkable without evidence of aortic   aneurysm. No bulky lymph node enlargement.       Impression    IMPRESSION:   1. Findings consistent with ACUTE PANCREATITIS without evidence of pseudocyst.   Recommend correlation with pancreatic enzymes.   2. Wall-mucosal thickening of the stomach suspicious for gastritis/peptic ulcer   disease.   3. Colon contains moderate amount of fecal matter suggestive of constipation.   4. Other nonemergent/incidental findings as described.     COMMENT:   Suboptimal evaluation of bowel loops and abdominal organs due to lack of   intravenous contrast.     THIS DOCUMENT HAS BEEN ELECTRONICALLY SIGNED BY MILKA MCFARLAND MD   CBC with platelets differential   Result Value Ref Range    WBC 11.4 (H) 4.0 - 11.0 10e9/L    RBC Count 4.39 3.8 - 5.2 10e12/L    Hemoglobin 13.6 11.7 - 15.7 g/dL    Hematocrit 39.1 35.0 - 47.0 %    MCV 89 78 - 100 fl    MCH 31.0 26.5 - 33.0 pg    MCHC 34.8 31.5 - 36.5 g/dL    RDW 12.7 10.0 - 15.0 %    Platelet Count 173 150 - 450 10e9/L    Diff Method Automated Method    Comprehensive metabolic panel   Result Value Ref Range    Sodium 136 134 - 144 mmol/L    Potassium 4.4 3.5 - 5.1 mmol/L    Chloride 104 98 - 107 mmol/L    Carbon Dioxide 24 21 - 31 mmol/L    Anion Gap 8 3 - 14 mmol/L    Glucose 131 (H) 70 - 105 mg/dL    Urea Nitrogen 20 7 - 25 mg/dL    Creatinine 0.68 0.60 - 1.20 mg/dL    GFR Estimate >90 >60 mL/min/[1.73_m2]    GFR Estimate If Black >90 >60 mL/min/[1.73_m2]    Calcium 8.8 8.6 - 10.3 mg/dL    Bilirubin Total 0.4 0.3 - 1.0 mg/dL    Albumin 3.4 (L) 3.5 - 5.7 g/dL    Protein Total 5.9 (L) 6.4 - 8.9 g/dL    Alkaline Phosphatase 57 34 - 104 U/L    ALT 14 7 - 52 U/L    AST 10 (L) 13 - 39 U/L   Amylase   Result Value Ref Range    Amylase 252 (H) 29 - 103 U/L   Lipase   Result Value Ref Range    Lipase 1,323 (H) 11 - 82 U/L   Lactic acid   Result Value Ref Range    Lactic Acid  0.5 0.5 - 2.2 mmol/L   Lipid profile   Result Value Ref Range    Cholesterol 131 <200 mg/dL    Triglycerides 85 <150 mg/dL    HDL Cholesterol 46 23 - 92 mg/dL    LDL Cholesterol Calculated 68 <100 mg/dL    Non HDL Cholesterol 85 <130 mg/dL   US Abdomen Limited    Narrative    EXAM:    US Abdomen Limited, Right Upper Quadrant     EXAM DATE/TIME:    1/23/2019 6:00 AM     CLINICAL HISTORY:    48 years old, female; Pain; Abdominal pain; Localized; Right upper quadrant   (ruq); Prior surgery; Surgery date: 6+ months; Surgery type: Cholecytectomy. ;   Additional info: Unexplained acute pancreatitis. R/O gal; L blaader stones     TECHNIQUE:    Real-time ultrasound of the abdomen with image documentation. Examination was   focused on the right upper quadrant.     COMPARISON:    CT ABDOMEN PELVIS W CONTRAST 1/23/2019 4:27 AM     FINDINGS:    Liver:  The liver measures 13.5 cm in sagittal dimensions. Normal directional   flow of the portal vein.    Gallbladder:  Status post cholecystectomy.    Common bile duct:  The common bile duct measures 3 mm.    Pancreas:  Pancreas is not well-seen due to overlying bowel gas.    Right kidney:  The right kidney measures 9.8 x 5.4 cm.    Aorta:  The aorta is normal in caliber.       Impression    IMPRESSION:   Status post cholecystectomy.  Otherwise unremarkable right upper quadrant   ultrasound.    THIS DOCUMENT HAS BEEN ELECTRONICALLY SIGNED BY KI SANTIAGO MD       Medications   0.9% sodium chloride BOLUS (1,000 mLs Intravenous New Bag 1/23/19 2387)     Followed by   sodium chloride 0.9% infusion (not administered)   morphine (PF) injection 2 mg (not administered)   magnesium hydroxide (MILK OF MAGNESIA) suspension 30 mL (30 mLs Oral Given 1/23/19 0148)   ondansetron (ZOFRAN-ODT) ODT tab 4 mg (4 mg Oral Given 1/23/19 0148)   sodium phosphate (FLEET ENEMA) 1 enema (1 enema Rectal Given 1/23/19 7563)   ketorolac (TORADOL) injection 30 mg (30 mg Intramuscular Given 1/23/19 2699)    iohexol (OMNIPAQUE) 240 mg/mL solution 50 mL (50 mLs Oral Given 1/23/19 0350)   ondansetron (ZOFRAN) injection 4 mg (4 mg Intravenous Given 1/23/19 0541)       3:30 AM  Patient is status post milk of magnesia and fleet enema with moderate to large amount of non bloody semi solid stool however she continues to complain of feeling bloated and having generalized abdominal pain radiating to her legs and back which she describes as similar to pain she had when she was diagnosed with colitis. She is currently afebrile. At this point will obtain a CT abdomen and pelvis with oral contrast.     5:40 AM  Abdominal CT scan reveal acute pancreatitis, gastric mucosal wall thickening and moderate stool in colon. Patient continue to complain of pain, hence she was made NPO. IV fluid ordered, CBC, Lipase, Amylase, CMP, Lipid profile and gall bladder US ordered to evaluate for dilated common bile duct s/p cholecystectomy which was done in 2005. High dose steroid can also be a culprit.     7:02 AM  Patient has a markedly elevated lipase of 1300.  Amylase of 250.  Lipid profile within normal limits as triglyceride is only 85.  Gallbladder ultrasound reveals common bile duct of 3 mm status post cholecystectomy hence gallstone not responsible for acute pancreatitis.  Given that patient was on high-dose steroid this is most likely the cause of her acute pancreatitis.  She will be  admitted on n.p.o.  IV pain medications and fluids.    Assessments & Plan (with Medical Decision Making)   This 48-year-old female presents with generalized abdominal pain and constipation after being treated for optic neuritis with high-dose steroids.  She received milk of magnesia and Fleet enema and had significant semisolid stool however she continued to have generalized abdominal pain.  CT scan obtained revealed acute pancreatitis as well as gastritis.  Blood work revealed markedly elevated lipase and amylase.  Lipid profile was within normal limits and  gallbladder ultrasound reviewed common bile duct of 3 mm not suggestive for gallstone induced pancreatitis.  Patient's acute pink otitis is likely due to high-dose steroids which was using treatment for optic neuritis.  She has been made n.p.o. will be admitted for IV fluids and IV pain medication.  We will gradually advance her diet as tolerated.  Patient signed out to the hospitalist, Dr. Villa who will be managing patient on the medical floor.    I have reviewed the nursing notes.    I have reviewed the findings, diagnosis, plan and need for follow up with the patient.       Medication List      There are no discharge medications for this visit.         Final diagnoses:   Drug induced acute pancreatitis without necrosis or infection   Acute gastritis without hemorrhage, unspecified gastritis type       1/23/2019   [facility]     Cecy Malik MD  01/23/19 4129

## 2019-01-23 NOTE — ED TRIAGE NOTES
Patient was just released from [facility] on Monday. Patient was transferred their from [facility] due to loosing vision in R eye and neuro issues. Patient states she got a ton of steriods and testing done and had her spinal fluid tested. Patient states she is feeling like she is constipated, gave herself a suppository 1930 and had small results. Also tried miralax. Is now having abdominal pain and nausea and vomiting. Has hx of colitis and bowel issues. Hasn't had a good BM since last week. Very uncomfortable, 10/10 pain.  Mona Oleary RN on 1/23/2019 at 1:26 AM

## 2019-01-23 NOTE — PLAN OF CARE
Discharge Planner PT   Patient plan for discharge: return home  Current status: Pt has very mild unsteadiness when ambulating. Pt ambulated in hallway >250' with SBA/CGA and no assistive device. Pt is independent with bed mobility and transferring.  Barriers to return to prior living situation: abdominal pain  Recommendations for discharge: Pt will likely discharge home with  support when medically cleared.  Rationale for recommendations: see above.       Entered by: Jason Jack 01/23/2019 3:12 PM

## 2019-01-23 NOTE — PROGRESS NOTES
" NSG ADMISSION NOTE    Patient admitted to room 333 at approximately 0840 via cart from emergency room. Patient was accompanied by transport tech.     Verbal SBAR report received from Bridgette ALCANTAR prior to patient arrival.     Patient ambulated to bed independently. Patient alert and oriented X 3. Pain is controlled with current analgesics.  Medication(s) being used: Meds given in ER. 0-10 Pain Scale: 10. Admission vital signs: Blood pressure 107/55, pulse 71, temperature 98.6  F (37  C), temperature source Tympanic, resp. rate 16, height 1.549 m (5' 1\"), weight 84.1 kg (185 lb 6.5 oz), SpO2 94 %, not currently breastfeeding. Patient was oriented to plan of care, call light, bed controls, tv, telephone, bathroom and visiting hours.     Risk Assessment    The following safety risks were identified during admission: none. Yellow risk band applied: NO.     Skin Initial Assessment    This writer admitted this patient and completed a full skin assessment and Shaw score in the Adult PCS flowsheet. Appropriate interventions initiated as needed.   .    Shaw Risk Assessment  Sensory Perception: 4-->no impairment  Moisture: 4-->rarely moist  Activity: 4-->walks frequently  Mobility: 3-->slightly limited  Nutrition: 3-->adequate  Friction and Shear: 3-->no apparent problem  Shaw Score: 21    Elisabet Champagne    "

## 2019-01-23 NOTE — H&P
[facility]    History and Physical  Hospitalist       Date of Admission:  1/23/2019    Assessment & Plan   Kerrie Patel is a 48 year old female who presents with abdominal pain.    Principal Problem:    Acute pancreatitis    Assessment: Patient is quite stable at this time, n.p.o., IV fluids, and her pain has been fairly well controlled    Plan: Admit, treat as above, and recheck labs in the morning  Active Problems:    Optic neuritis    Assessment: Extensive evaluation was done at [facility] she has idiopathic optic neuritis, no evidence of multiple sclerosis.      Plan: Continue current treatments    Slow transit constipation    Assessment: Resolving    Plan: Anticonstipation medications as needed    Lactose intolerance in adult    Assessment: Stable-patient avoids dairy products    Plan: She will be n.p.o. but once diet is advanced she will need to avoid dairy products    DVT Prophylaxis: Pneumatic Compression Devices  Code Status: Full Code    JUAREZ ENRIQUE    Primary Care Physician   Physician No Ref-Primary -patient's  has been transferred to [facility], and they are in the process of moving  Chief Complaint   Abdominal pain    History is obtained from the patient and chart review.    History of Present Illness   Kerrie Patel is a 48 year old female who presents with abdominal pain.  She states that the pain started roughly 24 hours ago and had gotten progressively worse.  She thought she was constipated and so took MiraLAX and suppository with minimal results.  As the day progressed, the pain became worse and she became quite nauseated.  She vomited once prior to coming to the ED.    She was discharged from [facility] in [place] just 2 days ago.  She was transferred there because of loss of vision in the right eye and was diagnosed with optic neuritis.  She had extensive evaluation done and was treated with high-dose steroids.  Evaluation for the possibility of  multiple sclerosis was done and no other evidence was found of this disease.  She states that the pain is now gone, and her vision is gradually clearing.  She was told that the vision should clear over the course of the next 2 weeks or so.  She was treated with the steroids in the hospital but is no longer on any steroids.    She otherwise has been in good general health.  She has no other problems or complications.  She does not drink any alcohol and is a non-smoker.  She has not previously had any problems such as this.  She denies urinary tract symptoms.  Pain is localized mostly in the upper abdomen.  The pain is been well controlled since she received morphine in the ED.    Past Medical History    I have reviewed this patient's medical history and updated it with pertinent information if needed.   Past Medical History:   Diagnosis Date     Diffuse cystic mastopathy of breast     No Comments Provided     Family history of diabetes mellitus     No Comments Provided     Gastro-esophageal reflux disease without esophagitis     No Comments Provided     Pain in thoracic spine     No Comments Provided       Past Surgical History   I have reviewed this patient's surgical history and updated it with pertinent information if needed.  Past Surgical History:   Procedure Laterality Date      SECTION      96, 99     CHOLECYSTECTOMY           OTHER SURGICAL HISTORY      2012,SGW322,PREMALIG/BENIGN SKIN LESION EXCISION,BREAST LESION     OTHER SURGICAL HISTORY      2017,81807.0,[place] [facility] W TUBES/OVAR 250 G OR LESS       Prior to Admission Medications   Prior to Admission Medications   Prescriptions Last Dose Informant Patient Reported? Taking?   polyethylene glycol (MIRALAX/GLYCOLAX) powder 2019 at PM  Yes Yes   Sig: Take 1 capful by mouth 2 times daily      Facility-Administered Medications: None     Allergies   Allergies   Allergen Reactions     Amoxicillin Nausea and Vomiting     Lactose      Other  reaction(s): GI Bleeding       Social History   I have reviewed this patient's social history and updated it with pertinent information if needed. Kerrie Patel  reports that  has never smoked. she has never used smokeless tobacco. She reports that she does not drink alcohol or use drugs.    Family History   I have reviewed this patient's family history and updated it with pertinent information if needed.   Family History   Problem Relation Age of Onset     Diabetes Father         Diabetes,Type 2, Diverticulitis age 47      Breast Cancer No family hx of         Cancer-breast       Review of Systems     REVIEW OF SYSTEMS:    Constitutional: normal energy and appetite, no recent sick contacts  Eyes: See HPI  Ears, nose, mouth, throat, and face: no mouth sores, dysphagia, or odynophagia  Respiratory: no shortness of breath, cough, or wheezing. No aspiration symptoms.   Cardiovascular: no chest pain, palpitations, orthopnea, increased lower extremity edema, or syncope.   Gastrointestinal: See HPI  Genitourinary: no dysuria, hematuria, urgency or frequency.   Hematologic/lymphatic: no unintentional weight loss or night sweats.  Musculoskeletal: no pain to extremities or falls.   Neurological: no new weakness, tingling, numbness.   Psychiatric: no hallucinations ordelusions.  Endocrine: She is not a known diabetic.      Physical Exam   Temp: 98.6  F (37  C) Temp src: Tympanic BP: 107/55 Pulse: 71   Resp: 16 SpO2: 94 % O2 Device: None (Room air)    Vital Signs with Ranges  Temp:  [98.5  F (36.9  C)-99.8  F (37.7  C)] 98.6  F (37  C)  Pulse:  [59-81] 71  Resp:  [16] 16  BP: ()/(40-68) 107/55  SpO2:  [91 %-98 %] 94 %  185 lbs 6.51 oz    Constitutional: She is awake and alert, lying in the ED cart, pleasant and cooperative, answering questions appropriately.  She is in minimal pain at this time having received IV morphine.  Eyes: Pupils are equal round reactive and extraocular movements intact.  HEENT: Within normal  limits, neck is supple with no adenopathy  Respiratory: Lungs are clear  Cardiovascular: Regular rhythm with no murmur gallop.  No peripheral edema.  Good distal pulses.  GI: Soft, mild epigastric tenderness, no organomegaly.  No masses, bowel sounds are normal.  No bruits are heard.  Lymph/Hematologic: No adenopathy, no bruising  Skin: Minimal diaphoresis.  No rashes noted  Musculoskeletal: Moves all extremities, no muscle atrophy  Neurologic: Intact and nonfocal  Psychiatric: Alert and oriented with normal mood and affect    Data   Data reviewed today:  I personally reviewed the The CT and ultrasound reports, not the image(s) showing Evidence of pancreatitis and gastritis, no obstruction noted..  White count is minimally elevated at 11.4 otherwise CBC and metabolic panel are normal, other than a slightly low potassium.  Liver functions and renal functions are normal.  Glucose is 131.  Lipase is markedly elevated at over 1300, and amylase slightly elevated.  Recent Labs   Lab 19  0528 19  1120   WBC 11.4* 6.7   HGB 13.6 14.6   MCV 89 93    197   INR  --  1.00    142   POTASSIUM 4.4 4.4   CHLORIDE 104 108*   CO2 24 28   BUN 20 17   CR 0.68 0.84   ANIONGAP 8 6   LISA 8.8 9.5   * 102   ALBUMIN 3.4* 4.1   PROTTOTAL 5.9* 7.4   BILITOTAL 0.4 0.3   ALKPHOS 57 68   ALT 14 21   AST 10* 16   LIPASE 1,323*  --        Recent Results (from the past 24 hour(s))   CT Abdomen Pelvis w Contrast    Narrative    EXAM:    CT Abdomen and Pelvis With Contrast     EXAM DATE/TIME:    2019 3:35 AM     CLINICAL HISTORY:    48 years old, female; Pain; Abdominal pain; Generalized; Prior surgery;   Surgery date: 6+ months; Surgery type: Cholecystectomy. ; Patient HX:   Abdominal pain and nausea and vomiting. Has HX of colitis and bowel issues.   Hasn't had a good bm since last week. ** patient was scanned approximately 40   minutes after drinking the oral contrast per request of ordering physician.  ;   Additional info: Abd pain, gastroenteritis or colitis suspected     TECHNIQUE:    Axial computed tomography images of the abdomen and pelvis with intravenous   contrast.    All CT scans at this facility use at least one of these dose optimization   techniques: automated exposure control; mA and/or kV adjustment per patient   size (includes targeted exams where dose is matched to clinical indication); or   iterative reconstruction.    Coronal and sagittal reformatted images were created and reviewed.     CONTRAST:    50 ml of Omni 240 administered intravenously.     COMPARISON:    CT ABDOMEN PELVIS W 7/7/2017 2:55 PM     FINDINGS:     PANCREATICOHEPATOBILIARY: The liver is normal without a focal intrahepatic   mass or abnormality. No significant intra-or extrahepatic ductal dilation.   Spleen is unremarkable. Cholecystectomy.     Mild diffuse enlargement of the pancreas with mild peripancreatic fat   stranding extending into the anterior pararenal space without evidence of a   pseudocyst.     GENITOURINARY: No adrenal mass. Kidneys are unremarkable without obstructive   renal/ureteric calculi and no hydronephrosis. Urinary bladder is within normal   limits. Uterus is not visualized consistent with hysterectomy. No free fluid in   the pelvis.     GASTROINTESTINAL: A few colonic diverticula. Colon contains moderate amount   of fecal matter suggestive of constipation. Wall thickening of the distal   thoracic esophagus suggestive of esophagitis/reflux. Wall-mucosal thickening of   the stomach. No free intraperitoneal air or fluid collection. A normal APPENDIX   is visualized.     OTHER STRUCTURES: Aorta appears unremarkable without evidence of aortic   aneurysm. No bulky lymph node enlargement.       Impression    IMPRESSION:   1. Findings consistent with ACUTE PANCREATITIS without evidence of pseudocyst.   Recommend correlation with pancreatic enzymes.   2. Wall-mucosal thickening of the stomach suspicious for  gastritis/peptic ulcer   disease.   3. Colon contains moderate amount of fecal matter suggestive of constipation.   4. Other nonemergent/incidental findings as described.     COMMENT:   Suboptimal evaluation of bowel loops and abdominal organs due to lack of   intravenous contrast.     THIS DOCUMENT HAS BEEN ELECTRONICALLY SIGNED BY MILKA MCFARLAND MD   US Abdomen Limited    Narrative    EXAM:    US Abdomen Limited, Right Upper Quadrant     EXAM DATE/TIME:    1/23/2019 6:00 AM     CLINICAL HISTORY:    48 years old, female; Pain; Abdominal pain; Localized; Right upper quadrant   (ruq); Prior surgery; Surgery date: 6+ months; Surgery type: Cholecytectomy. ;   Additional info: Unexplained acute pancreatitis. R/O gal; L blaader stones     TECHNIQUE:    Real-time ultrasound of the abdomen with image documentation. Examination was   focused on the right upper quadrant.     COMPARISON:    CT ABDOMEN PELVIS W CONTRAST 1/23/2019 4:27 AM     FINDINGS:    Liver:  The liver measures 13.5 cm in sagittal dimensions. Normal directional   flow of the portal vein.    Gallbladder:  Status post cholecystectomy.    Common bile duct:  The common bile duct measures 3 mm.    Pancreas:  Pancreas is not well-seen due to overlying bowel gas.    Right kidney:  The right kidney measures 9.8 x 5.4 cm.    Aorta:  The aorta is normal in caliber.       Impression    IMPRESSION:   Status post cholecystectomy.  Otherwise unremarkable right upper quadrant   ultrasound.    THIS DOCUMENT HAS BEEN ELECTRONICALLY SIGNED BY KI SANTIAGO MD

## 2019-01-23 NOTE — PROGRESS NOTES
:     will continue to follow for discharge planning needs as needed.    KIRAN Craig on 1/23/2019 at 2:17 PM

## 2019-01-23 NOTE — PROGRESS NOTES
"   01/23/19 1400   Quick Adds   Type of Visit Initial PT Evaluation   Living Environment   Lives With spouse   Living Arrangements house   Home Accessibility stairs to enter home;stairs within home   Living Environment Comment Lives with  in a multi-level home. Pt states that kitchen, bathroom, and master bedroom are all on the main floor. Pt doesn't need to go upstairs.   Self-Care   Usual Activity Tolerance excellent   Current Activity Tolerance good   Equipment Currently Used at Home none   Activity/Exercise/Self-Care Comment Pt was independent in all activities prior to vision impairment and current dx   Functional Level Prior   Ambulation 0-->independent   Transferring 0-->independent   Toileting 0-->independent   Bathing 0-->independent   Communication 0-->understands/communicates without difficulty   Swallowing 0-->swallows foods/liquids without difficulty   Cognition 0 - no cognition issues reported   Fall history within last six months no   General Information   Onset of Illness/Injury or Date of Surgery - Date 01/22/19   Precautions/Limitations no known precautions/limitations   Weight-Bearing Status - LUE full weight-bearing   Weight-Bearing Status - RUE full weight-bearing   Weight-Bearing Status - LLE full weight-bearing   Weight-Bearing Status - RLE full weight-bearing   General Info Comments Pt notes her legs feel \"off\" and a little uneasy    Cognitive Status Examination   Orientation orientation to person, place and time   Level of Consciousness alert   Follows Commands and Answers Questions 100% of the time   Personal Safety and Judgment intact   Memory intact   Pain Assessment   Patient Currently in Pain Yes, see Vital Sign flowsheet   Integumentary/Edema   Integumentary/Edema no deficits were identifed   Bed Mobility   Bed Mobility Comments Pt utilizes bedrails to roll but completes supine to sit without any physical assist.   Transfer Skills   Transfer Comments Pt independent with " transfers.   Gait   Gait Comments Pt ambulated in hallway ~300' with SBA/CGA and no assistive device. Pt did push IV pole initially but ambulated without for most of distance.   Balance   Balance Comments Pt notes feeling unsteady when ambulating but pt demonstrated no signs of LOB or difficulty ambulating. Static and dynamic balance appear intact.   Clinical Impression   PT Diagnosis decreased activity tolerance   Influenced by the following impairments acute pancreatitis, abdominal pain   Clinical Presentation Stable/Uncomplicated   Clinical Decision Making (Complexity) Low complexity   Therapy Frequency` daily   Predicted Duration of Therapy Intervention (days/wks) 2-3 days   Anticipated Discharge Disposition Home   Risk & Benefits of therapy have been explained Yes   Patient, Family & other staff in agreement with plan of care Yes   Total Evaluation Time   Total Evaluation Time (Minutes) 15

## 2019-01-23 NOTE — ED NOTES
Report given to KATHARINE Sparks. Called patient  to give update on status. Mona Oleary RN on 1/23/2019 at 6:55 AM

## 2019-01-24 ENCOUNTER — APPOINTMENT (OUTPATIENT)
Dept: OCCUPATIONAL THERAPY | Facility: OTHER | Age: 49
End: 2019-01-24
Payer: COMMERCIAL

## 2019-01-24 LAB
ANION GAP SERPL CALCULATED.3IONS-SCNC: 4 MMOL/L (ref 3–14)
BUN SERPL-MCNC: 14 MG/DL (ref 7–25)
CALCIUM SERPL-MCNC: 8.6 MG/DL (ref 8.6–10.3)
CHLORIDE SERPL-SCNC: 108 MMOL/L (ref 98–107)
CO2 SERPL-SCNC: 28 MMOL/L (ref 21–31)
CREAT SERPL-MCNC: 0.71 MG/DL (ref 0.6–1.2)
ERYTHROCYTE [DISTWIDTH] IN BLOOD BY AUTOMATED COUNT: 13.2 % (ref 10–15)
GFR SERPL CREATININE-BSD FRML MDRD: 88 ML/MIN/{1.73_M2}
GLUCOSE SERPL-MCNC: 82 MG/DL (ref 70–105)
HCT VFR BLD AUTO: 37.3 % (ref 35–47)
HGB BLD-MCNC: 12.1 G/DL (ref 11.7–15.7)
LIPASE SERPL-CCNC: 185 U/L (ref 11–82)
MCH RBC QN AUTO: 30.6 PG (ref 26.5–33)
MCHC RBC AUTO-ENTMCNC: 32.4 G/DL (ref 31.5–36.5)
MCV RBC AUTO: 94 FL (ref 78–100)
PLATELET # BLD AUTO: 137 10E9/L (ref 150–450)
POTASSIUM SERPL-SCNC: 4.8 MMOL/L (ref 3.5–5.1)
RBC # BLD AUTO: 3.95 10E12/L (ref 3.8–5.2)
SODIUM SERPL-SCNC: 140 MMOL/L (ref 134–144)
WBC # BLD AUTO: 7.1 10E9/L (ref 4–11)

## 2019-01-24 PROCEDURE — 25000128 H RX IP 250 OP 636: Performed by: FAMILY MEDICINE

## 2019-01-24 PROCEDURE — 99232 SBSQ HOSP IP/OBS MODERATE 35: CPT | Performed by: FAMILY MEDICINE

## 2019-01-24 PROCEDURE — 25000132 ZZH RX MED GY IP 250 OP 250 PS 637: Performed by: FAMILY MEDICINE

## 2019-01-24 PROCEDURE — 83690 ASSAY OF LIPASE: CPT | Performed by: FAMILY MEDICINE

## 2019-01-24 PROCEDURE — 97165 OT EVAL LOW COMPLEX 30 MIN: CPT | Mod: GO | Performed by: OCCUPATIONAL THERAPIST

## 2019-01-24 PROCEDURE — 40000133 ZZH STATISTIC OT WARD VISIT: Performed by: OCCUPATIONAL THERAPIST

## 2019-01-24 PROCEDURE — 36415 COLL VENOUS BLD VENIPUNCTURE: CPT | Performed by: FAMILY MEDICINE

## 2019-01-24 PROCEDURE — 80048 BASIC METABOLIC PNL TOTAL CA: CPT | Performed by: FAMILY MEDICINE

## 2019-01-24 PROCEDURE — 85027 COMPLETE CBC AUTOMATED: CPT | Performed by: FAMILY MEDICINE

## 2019-01-24 PROCEDURE — 97535 SELF CARE MNGMENT TRAINING: CPT | Mod: GO | Performed by: OCCUPATIONAL THERAPIST

## 2019-01-24 PROCEDURE — 12000000 ZZH R&B MED SURG/OB

## 2019-01-24 RX ADMIN — KETOROLAC TROMETHAMINE 30 MG: 30 INJECTION, SOLUTION INTRAMUSCULAR at 00:05

## 2019-01-24 RX ADMIN — SODIUM CHLORIDE AND POTASSIUM CHLORIDE: 9; 1.49 INJECTION, SOLUTION INTRAVENOUS at 17:49

## 2019-01-24 RX ADMIN — POLYETHYLENE GLYCOL (3350) 17 G: 17 POWDER, FOR SOLUTION ORAL at 10:08

## 2019-01-24 RX ADMIN — POLYETHYLENE GLYCOL (3350) 17 G: 17 POWDER, FOR SOLUTION ORAL at 21:00

## 2019-01-24 RX ADMIN — KETOROLAC TROMETHAMINE 30 MG: 30 INJECTION, SOLUTION INTRAMUSCULAR at 19:51

## 2019-01-24 RX ADMIN — SODIUM CHLORIDE AND POTASSIUM CHLORIDE: 9; 1.49 INJECTION, SOLUTION INTRAVENOUS at 05:31

## 2019-01-24 RX ADMIN — PANTOPRAZOLE SODIUM 40 MG: 40 TABLET, DELAYED RELEASE ORAL at 08:07

## 2019-01-24 ASSESSMENT — ACTIVITIES OF DAILY LIVING (ADL)
ADLS_ACUITY_SCORE: 11

## 2019-01-24 ASSESSMENT — MIFFLIN-ST. JEOR: SCORE: 1381.6

## 2019-01-24 NOTE — PLAN OF CARE
Discharge Planner OT   Patient plan for discharge: to return home with family as previous   Current status: Pt reports feeling somewhat weak and slight dizziness with functional mobility, but ambulated around the room, took a full shower and dressed/groomed self with no assist needed at all from Therapist, distant supervision.   Barriers to return to prior living situation: none   Recommendations for discharge: home with family assist as needed, no further OT needs at this time.   Rationale for recommendations: see above        Entered by: Ruchi Jolly 01/24/2019 2:46 PM

## 2019-01-24 NOTE — PROGRESS NOTES
01/24/19 1400   Quick Adds   Type of Visit Initial Occupational Therapy Evaluation   Living Environment   Lives With spouse   Living Arrangements house   Functional Level   Ambulation 0-->independent   Transferring 0-->independent   Toileting 0-->independent   Bathing 0-->independent   Dressing 0-->independent   Eating 0-->independent   Cognitive Status Examination   Orientation orientation to person, place and time   Visual Perception   Visual Perception No deficits were identified   Pain Assessment   Patient Currently in Pain No   Range of Motion (ROM)   ROM Quick Adds No deficits were identified   Strength   Manual Muscle Testing Quick Adds No deficits were identified   Coordination   Upper Extremity Coordination No deficits were identified   Transfer Skill: Bed to Chair/Chair to Bed   Level of Armstrong: Bed to Chair stand-by assist   Physical Assist/Nonphysical Assist: Bed to Chair supervision   Transfer Skill: Sit to Stand   Level of Armstrong: Sit/Stand stand-by assist   Physical Assist/Nonphysical Assist: Sit/Stand supervision   Tub/Shower Transfer   Tub/Shower Transfer Transfer Skill: Tub/Shower Transfers   Transfer Skill: Tub/Shower Transfer   Level of Armstrong: Tub/Shower stand-by assist   Physical Assist/Nonphysical Assist: Tub/Shower supervision   Bathing   Level of Armstrong stand-by assist   Physical Assist/Nonphysical Assist supervision;set-up required   Upper Body Dressing   Level of Armstrong: Dress Upper Body stand-by assist   Lower Body Dressing   Level of Armstrong: Dress Lower Body stand-by assist   Grooming   Level of Armstrong: Grooming stand-by assist   Physical Assist/Nonphysical Assist: Grooming supervision   Clinical Impression   Criteria for Skilled Therapeutic Interventions Met evaluation only   OT Diagnosis weakness for function    Influenced by the following impairments weakness, slight dizziness   Assessment of Occupational Performance 1-3 Performance  Deficits   Identified Performance Deficits weakness    Clinical Decision Making (Complexity) Low complexity   Therapy Frequency daily   Predicted Duration of Therapy Intervention (days/wks) eval only    Anticipated Equipment Needs at Discharge (no needs )   Anticipated Discharge Disposition Home   Risks and Benefits of Treatment have been explained. Yes   Patient, Family & other staff in agreement with plan of care Yes   Total Evaluation Time   Total Evaluation Time (Minutes) 15

## 2019-01-24 NOTE — PLAN OF CARE
Discharge Planner PT    Patient continues to perform all mobility tasks with supervision only. PT to remain available to assist with patient mobility as needed.       Entered by: Maicol Simons 01/24/2019 4:22 PM

## 2019-01-24 NOTE — PLAN OF CARE
Patients VSS, afebrile, denies pain but has abdominal fullness and burping. Patient has not requested pain medication. IV infusing at 75 hr. Elisabet Champagne RN 1/24/2019 5:35 PM

## 2019-01-24 NOTE — PLAN OF CARE
Patient is on a clear liquid diet and has had chicken broth and juice which she tolerated very well. For lunch she ate some jello and began having upper abdominal and mid back pain with episodes of burping. Will stay with broth and juice for now. Elisabet Champagne RN 1/24/2019 1:19 PM

## 2019-01-24 NOTE — PLAN OF CARE
Patients VSS, afebrile, C/O upper abdominal and back pain. PRN meds given per order and patient has had managed pain control. Patient is independent in room, voiding adequately, patient had BM in ER but no BM this shift, BS active. Patient tolerating NPO with ice chips. Elisabet Champagne RN 1/23/2019 7:41 PM

## 2019-01-24 NOTE — PROGRESS NOTES
:    It was mentioned in discharge planning that patient will not have any discharge needs.  No further needs at this time.    KIRAN Craig on 1/24/2019 at 12:58 PM

## 2019-01-24 NOTE — PROGRESS NOTES
[facility]    Hospitalist Progress Note      Assessment & Plan   Kerrie Patel is a 48 year old female who was admitted on 1/23/2019.     Principal Problem:    Acute pancreatitis    Assessment: Symptoms have resolved nicely.  Lipase is markedly improved.    Plan: We will advance to clear liquids.  Recheck labs again in the morning.  Active Problems:    Optic neuritis    Assessment: Stable    Plan: Obtain records from [facility] regarding her lumbar puncture, and neurologic consultation.    Lactose intolerance in adult    Assessment: Stable    Plan: Lactose-free diet once her diet is advanced.    DVT Prophylaxis: Pneumatic Compression Devices  Code Status: Full Code    JUAREZ KOCHFORD    Interval History   Feeling much better today.  Abdominal pain has resolved.  No recurrent visual symptoms.  No real concerns.     is present and had questions regarding their visit to [facility] last weekend.  Records are not yet available.    -Data reviewed today: I reviewed all new labs and imaging results over the last 24 hours. I personally reviewed no images or EKG's today.  CBC and basic metabolic panel are essentially normal.  Lipase is down to 185.    Physical Exam   Temp: 97.9  F (36.6  C) Temp src: Tympanic BP: 128/61 Pulse: 52   Resp: 16 SpO2: 100 % O2 Device: None (Room air)    Vitals:    01/23/19 0126 01/23/19 0850 01/24/19 0300   Weight: 78.7 kg (173 lb 9.6 oz) 84.1 kg (185 lb 6.5 oz) 80.9 kg (178 lb 6.4 oz)     Vital Signs with Ranges  Temp:  [97.1  F (36.2  C)-98.1  F (36.7  C)] 97.9  F (36.6  C)  Pulse:  [49-54] 52  Resp:  [12-16] 16  BP: (106-128)/(46-61) 128/61  SpO2:  [93 %-100 %] 100 %  I/O last 3 completed shifts:  In: 1974 [I.V.:1974]  Out: 700 [Urine:700]    Constitutional: Awake and alert, sitting up in chair in no acute distress.  Pleasant and cooperative and answers questions appropriately.  Respiratory: Lungs are clear with good air movement  Cardiovascular: Regular rhythm,  no murmur gallop appreciated  GI: Soft, nontender, no organomegaly or masses, bowel sounds are normal  Skin/Integumen: Warm and dry, no diaphoresis.  No rashes  Other: Neuro and psychiatric normal    Medications     0.9% sodium chloride + KCl 20 mEq/L 100 mL/hr at 01/24/19 0531       pantoprazole  40 mg Oral QAM AC     polyethylene glycol  17 g Oral BID     sodium chloride (PF)  3 mL Intracatheter Q8H       Data   Recent Labs   Lab 01/24/19  0451 01/23/19  0528 01/18/19  1120   WBC 7.1 11.4* 6.7   HGB 12.1 13.6 14.6   MCV 94 89 93   * 173 197   INR  --   --  1.00    136 142   POTASSIUM 4.8 4.4 4.4   CHLORIDE 108* 104 108*   CO2 28 24 28   BUN 14 20 17   CR 0.71 0.68 0.84   ANIONGAP 4 8 6   LISA 8.6 8.8 9.5   GLC 82 131* 102   ALBUMIN  --  3.4* 4.1   PROTTOTAL  --  5.9* 7.4   BILITOTAL  --  0.4 0.3   ALKPHOS  --  57 68   ALT  --  14 21   AST  --  10* 16   LIPASE 185* 1,323*  --        No results found for this or any previous visit (from the past 24 hour(s)).

## 2019-01-24 NOTE — PLAN OF CARE
Patient bradycardic with heart rates in 50's throughout shift. Patient states that this is her baseline. All other VSS. PRN percocet and toradol administered for abdominal pain with adequate relief, see MAR. Bowel sounds are audible and active in all quadrants. Patient denies tenderness to palpation this a.m. Patient remains NPO except ice chips and denies nausea and vomiting.

## 2019-01-25 VITALS
OXYGEN SATURATION: 99 % | BODY MASS INDEX: 33.68 KG/M2 | RESPIRATION RATE: 14 BRPM | SYSTOLIC BLOOD PRESSURE: 150 MMHG | DIASTOLIC BLOOD PRESSURE: 80 MMHG | WEIGHT: 178.4 LBS | TEMPERATURE: 98.1 F | HEART RATE: 60 BPM | HEIGHT: 61 IN

## 2019-01-25 LAB
ANION GAP SERPL CALCULATED.3IONS-SCNC: 8 MMOL/L (ref 3–14)
BUN SERPL-MCNC: 9 MG/DL (ref 7–25)
CALCIUM SERPL-MCNC: 9.1 MG/DL (ref 8.6–10.3)
CHLORIDE SERPL-SCNC: 106 MMOL/L (ref 98–107)
CO2 SERPL-SCNC: 27 MMOL/L (ref 21–31)
CREAT SERPL-MCNC: 0.72 MG/DL (ref 0.6–1.2)
ERYTHROCYTE [DISTWIDTH] IN BLOOD BY AUTOMATED COUNT: 12.7 % (ref 10–15)
GFR SERPL CREATININE-BSD FRML MDRD: 87 ML/MIN/{1.73_M2}
GLUCOSE SERPL-MCNC: 89 MG/DL (ref 70–105)
HCT VFR BLD AUTO: 40.6 % (ref 35–47)
HGB BLD-MCNC: 13.4 G/DL (ref 11.7–15.7)
LIPASE SERPL-CCNC: 75 U/L (ref 11–82)
MCH RBC QN AUTO: 30.2 PG (ref 26.5–33)
MCHC RBC AUTO-ENTMCNC: 33 G/DL (ref 31.5–36.5)
MCV RBC AUTO: 92 FL (ref 78–100)
PLATELET # BLD AUTO: 160 10E9/L (ref 150–450)
POTASSIUM SERPL-SCNC: 4.3 MMOL/L (ref 3.5–5.1)
RBC # BLD AUTO: 4.43 10E12/L (ref 3.8–5.2)
SODIUM SERPL-SCNC: 141 MMOL/L (ref 134–144)
WBC # BLD AUTO: 8.3 10E9/L (ref 4–11)

## 2019-01-25 PROCEDURE — 25000132 ZZH RX MED GY IP 250 OP 250 PS 637: Performed by: FAMILY MEDICINE

## 2019-01-25 PROCEDURE — 80048 BASIC METABOLIC PNL TOTAL CA: CPT | Performed by: FAMILY MEDICINE

## 2019-01-25 PROCEDURE — 85027 COMPLETE CBC AUTOMATED: CPT | Performed by: FAMILY MEDICINE

## 2019-01-25 PROCEDURE — 83690 ASSAY OF LIPASE: CPT | Performed by: FAMILY MEDICINE

## 2019-01-25 PROCEDURE — 99239 HOSP IP/OBS DSCHRG MGMT >30: CPT | Performed by: FAMILY MEDICINE

## 2019-01-25 PROCEDURE — 36415 COLL VENOUS BLD VENIPUNCTURE: CPT | Performed by: FAMILY MEDICINE

## 2019-01-25 PROCEDURE — 25000128 H RX IP 250 OP 636: Performed by: FAMILY MEDICINE

## 2019-01-25 RX ORDER — ACETAMINOPHEN 500 MG
1000 TABLET ORAL EVERY 6 HOURS PRN
Status: DISCONTINUED | OUTPATIENT
Start: 2019-01-25 | End: 2019-01-25 | Stop reason: HOSPADM

## 2019-01-25 RX ORDER — ACETAMINOPHEN 500 MG
1000 TABLET ORAL EVERY 6 HOURS PRN
Status: ON HOLD | COMMUNITY
Start: 2019-01-25 | End: 2019-03-06

## 2019-01-25 RX ORDER — PANTOPRAZOLE SODIUM 40 MG/1
40 TABLET, DELAYED RELEASE ORAL
Qty: 30 TABLET | Refills: 0 | Status: SHIPPED | OUTPATIENT
Start: 2019-01-26 | End: 2019-02-28

## 2019-01-25 RX ADMIN — PANTOPRAZOLE SODIUM 40 MG: 40 TABLET, DELAYED RELEASE ORAL at 07:50

## 2019-01-25 RX ADMIN — SODIUM CHLORIDE AND POTASSIUM CHLORIDE: 9; 1.49 INJECTION, SOLUTION INTRAVENOUS at 06:04

## 2019-01-25 RX ADMIN — POLYETHYLENE GLYCOL (3350) 17 G: 17 POWDER, FOR SOLUTION ORAL at 09:39

## 2019-01-25 ASSESSMENT — ACTIVITIES OF DAILY LIVING (ADL)
ADLS_ACUITY_SCORE: 11

## 2019-01-25 NOTE — PLAN OF CARE
VSS. Patient tolerating clear liquids without nausea. Bowel sounds audible and active in all quadrants. Abdomen remains tender to palpation. Patient ambulating halls independently. PRN toradol administered for headache with relief, see MAR.

## 2019-01-25 NOTE — PLAN OF CARE
Pt VS Temp: 98.1  F (36.7  C) Temp src: Tympanic BP: 150/80 Pulse: 60   Resp: 14 SpO2: 99 % O2 Device: None (Room air)        Complains of no pain. A&O X4. Upon assessment, lungs are clear and heart bradycardic. Skin intact. Tenderness in the abdomin with all 4 quadrants active. Pt was able to have a soft BM and has had appropriate output throughout the day. Stacey Marshall RN on 1/25/2019 at 3:06 PM

## 2019-01-25 NOTE — PHARMACY - DISCHARGE MEDICATION RECONCILIATION AND EDUCATION
Pharmacy: Discharge Counseling and Medication Reconciliation    Kerrie Patel  [address]  [place] [address]  [phone] (home) [phone] (work)  48 year old female  PCP:No Ref-Primary, Physician    Allergies   Allergen Reactions     Amoxicillin Nausea and Vomiting     Lactose      Other reaction(s): GI Bleeding       Discharge Counseling:    Pharmacist met with patient (and/or family) today to review the medication portion of the After Visit Summary (with an emphasis on NEW medications) and to address patient's questions/concerns.     Summary of Education:   Met with patient at time of discharge to review all changes in medications including new acetaminophen, Miralax and pantoprazole. Discussed indications, directions for use, and possible side effects. Patient asked a few questions and all concerns were addressed.     Materials Provided:   MedCounselor sheets printed from Clinical Pharmacology on: acetaminophen, pantoprazole (patient declined handout on Miralax    Discharge Medication Reconciliation:    Rosanne Fletcher has reviewed the patient's discharge medication orders and has compared them to the inpatient medication administration record and to what the patient was taking prior to admission- any discrepancies have been resolved.     It has been determined that the patient has an adequate supply of medications available or which can be obtained from the patient's preferred pharmacy, which has been confirmed as: [facility].     Thank you for the consult.     Rosanne Fletcher ....................  1/25/2019   2:45 PM

## 2019-01-25 NOTE — PROGRESS NOTES
Discharge Note    Data:  Kerrie Patel discharged to home at 1500 via wheel chair. Accompanied by spouse and staff.    Action:  Written discharge/follow-up instructions were provided to patient and spouse. Prescriptions sent to patients preferred pharmacy. All belongings sent with patient.  Pharmacy met with patient and had no further questions.     Response:  Patient verbalized understanding of discharge instructions, reason for discharge, and necessary follow-up appointments.

## 2019-01-25 NOTE — DISCHARGE SUMMARY
[facility]    Discharge Summary  Hospitalist    Date of Admission:  1/23/2019  Date of Discharge:  1/25/2019  Discharging Provider: Migue Koenig  Date of Service (when I saw the patient): 01/25/19    Discharge Diagnoses   Principal Problem:    Acute pancreatitis (1/23/2019)  Active Problems:    Optic neuritis (1/23/2019)    Slow transit constipation (1/23/2019)    Lactose intolerance in adult (1/23/2019)      History of Present Illness   Kerrie Patel is an 48 year old female who presented with abdominal pain and was found to have pancreatitis with elevated lipase of 1323 and CT scan consistent with acute pancreatitis. She has no gallbladder and does not drink alcohol. She had been hospitalized at [facility] with vision loss and MRI findings for optic neuritis. Treated with high dose steroids for a few days. None given on discharge. Not felt to have MS. Steroids are a potential cause for pancreatitis per Up to Date.     Hospital Course   Kerrie Patel was admitted on 1/23/2019.  The following problems were addressed during her hospitalization:     Acute pancreatitis    Assessment: Kept NPO, then advanced to clears. When she tried jello she felt worse, but more bloated than upper abdominal pain. Returned to clears, had a small BM this AM and is better. Today for lunch tolerated toast and had further BM. Feeling well and ready for discharge.     Plan: Discussed low fat diet, gradual return to her existing gluten free diet with some other dietary intolerances. No further workup for pancreatitis performed at this time as steroids could be a potential cause. However, if she has a repeat pancreatitis, then consider other etiologies given lack of alcohol intake, no gallbladder, and normal lipids.    Active Problems:    Slow transit constipation    Assessment: chronic, recent colonoscopy due to problems. Improved on fiber.    Plan: Continue Miralax and gradually resume fiber in diet. Discussed long-term  benefit with fiber vs short term diet changes needed for pancreatitis.      Optic neuritis    Assessment: Stable since discharge. Reviewed records from [facility]. Appears she was seen by Dr Javi Gagnon, who recommended follow up in neurology clinic in 2 weeks, but he is not visible as a doctor to follow up with on the [facility] website. Patient and  have not received a call back from [facility] to schedule in clinic as recommended and have tried calling without success.    Plan: I called and was told to contact [facility] to schedule appointment and when someone answered and found out that Kerrie was a new patient to their clinic I was transferred to another person and had to leave a message regarding referrals. Left detailed information on contacting patient and reason for appointment. Also placed referral in this discharge to aid in obtaining an appointment.    Migue Koenig MD    Significant Results and Procedures   none    Pending Results   These results will be followed up by NA  Unresulted Labs Ordered in the Past 30 Days of this Admission     No orders found from 11/24/2018 to 1/24/2019.          Code Status   Full Code       Primary Care Physician   Physician No Ref-Primary    Physical Exam   Temp: 98.1  F (36.7  C) Temp src: Tympanic BP: 150/80 Pulse: 60   Resp: 14 SpO2: 99 % O2 Device: None (Room air)    Vitals:    01/23/19 0126 01/23/19 0850 01/24/19 0300   Weight: 78.7 kg (173 lb 9.6 oz) 84.1 kg (185 lb 6.5 oz) 80.9 kg (178 lb 6.4 oz)     Vital Signs with Ranges  Temp:  [98.1  F (36.7  C)-98.6  F (37  C)] 98.1  F (36.7  C)  Pulse:  [52-73] 60  Resp:  [14-16] 14  BP: (133-150)/(58-85) 150/80  SpO2:  [93 %-99 %] 99 %  I/O last 3 completed shifts:  In: 1768 [P.O.:350; I.V.:1418]  Out: 2300 [Urine:2300]    General Appearance: Alert. No acute distress  Chest/Respiratory Exam: Clear to auscultation bilaterally  Cardiovascular Exam: Regular rate and rhythm. S1, S2, no murmur,  gallop, or rubs.  Gastrointestinal Exam: +BS, soft, nontender, no abnormal masses or organomegaly.  Extremities: No lower extremity edema.  Psychiatric: Normal affect and mentation      Discharge Disposition   Discharged to home  Condition at discharge: Stable    Consultations This Hospital Stay   PHYSICAL THERAPY ADULT IP CONSULT  OCCUPATIONAL THERAPY ADULT IP CONSULT  SOCIAL WORK IP CONSULT    Time Spent on this Encounter   IMigue, personally saw the patient today and spent greater than 30 minutes discharging this patient.    Discharge Orders      NEUROLOGY ADULT REFERRAL      Reason for your hospital stay    Pancreatitis and gastritis     Follow-up and recommended labs and tests     Follow up on 2/6 at 1030 with Coby Higgins for hospital follow- up.  No follow up labs or test are needed.     Discharge Instructions    Take Miralax daily to twice daily depending on constipation. If diarrhea occurs, reduce dose and continue consistently as we discussed.  Pantoprazole is like Prilosec or Prevacid available over the counter and is for acid blocking. Continue this for a month and then stop. Treating stomach inflammation.     Activity    Your activity upon discharge: activity as tolerated     Diet    Follow this diet upon discharge: Low Fat Diet  Gradually return to usual diet over next 1-2 weeks     Discharge Medications   Current Discharge Medication List      START taking these medications    Details   acetaminophen (TYLENOL) 500 MG tablet Take 2 tablets (1,000 mg) by mouth every 6 hours as needed for mild pain    Associated Diagnoses: Optic neuritis      pantoprazole (PROTONIX) 40 MG EC tablet Take 1 tablet (40 mg) by mouth every morning (before breakfast)  Qty: 30 tablet, Refills: 0    Associated Diagnoses: Gastritis without bleeding, unspecified chronicity, unspecified gastritis type         CONTINUE these medications which have NOT CHANGED    Details   polyethylene glycol (MIRALAX/GLYCOLAX) powder  Take 1 capful by mouth 2 times daily           Allergies   Allergies   Allergen Reactions     Amoxicillin Nausea and Vomiting     Lactose      Other reaction(s): GI Bleeding     Data   Most Recent 3 CBC's:  Recent Labs   Lab Test 19  05   WBC 8.3 7.1 11.4*   HGB 13.4 12.1 13.6   MCV 92 94 89    137* 173      Most Recent 3 BMP's:  Recent Labs   Lab Test 19  05    140 136   POTASSIUM 4.3 4.8 4.4   CHLORIDE 106 108* 104   CO2 27 28 24   BUN 9 14 20   CR 0.72 0.71 0.68   ANIONGAP 8 4 8   LISA 9.1 8.6 8.8   GLC 89 82 131*     Most Recent 2 LFT's:  Recent Labs   Lab Test 19  1120   AST 10* 16   ALT 14 21   ALKPHOS 57 68   BILITOTAL 0.4 0.3     Most Recent INR's and Anticoagulation Dosing History:  Anticoagulation Dose History     Recent Dosing and Labs Latest Ref Rng & Units 2019    INR 0 - 1.3 1.00        Most Recent 3 Troponin's:No lab results found.  Most Recent Cholesterol Panel:  Recent Labs   Lab Test 19   CHOL 131   LDL 68   HDL 46   TRIG 85     Most Recent 6 Bacteria Isolates From Any Culture (See EPIC Reports for Culture Details):No lab results found.  Most Recent TSH, T4 and A1c Labs:  Recent Labs   Lab Test 19  1512   T4  --  1.04   A1C 5.7  --      Results for orders placed or performed during the hospital encounter of 19   CT Abdomen Pelvis w Contrast    Narrative    EXAM:    CT Abdomen and Pelvis With Contrast     EXAM DATE/TIME:    2019 3:35 AM     CLINICAL HISTORY:    48 years old, female; Pain; Abdominal pain; Generalized; Prior surgery;   Surgery date: 6+ months; Surgery type: Cholecystectomy. ; Patient HX:   Abdominal pain and nausea and vomiting. Has HX of colitis and bowel issues.   Hasn't had a good bm since last week. ** patient was scanned approximately 40   minutes after drinking the oral contrast per request of ordering physician. ;    Additional info: Abd pain, gastroenteritis or colitis suspected     TECHNIQUE:    Axial computed tomography images of the abdomen and pelvis with intravenous   contrast.    All CT scans at this facility use at least one of these dose optimization   techniques: automated exposure control; mA and/or kV adjustment per patient   size (includes targeted exams where dose is matched to clinical indication); or   iterative reconstruction.    Coronal and sagittal reformatted images were created and reviewed.     CONTRAST:    50 ml of Omni 240 administered intravenously.     COMPARISON:    CT ABDOMEN PELVIS W 7/7/2017 2:55 PM     FINDINGS:     PANCREATICOHEPATOBILIARY: The liver is normal without a focal intrahepatic   mass or abnormality. No significant intra-or extrahepatic ductal dilation.   Spleen is unremarkable. Cholecystectomy.     Mild diffuse enlargement of the pancreas with mild peripancreatic fat   stranding extending into the anterior pararenal space without evidence of a   pseudocyst.     GENITOURINARY: No adrenal mass. Kidneys are unremarkable without obstructive   renal/ureteric calculi and no hydronephrosis. Urinary bladder is within normal   limits. Uterus is not visualized consistent with hysterectomy. No free fluid in   the pelvis.     GASTROINTESTINAL: A few colonic diverticula. Colon contains moderate amount   of fecal matter suggestive of constipation. Wall thickening of the distal   thoracic esophagus suggestive of esophagitis/reflux. Wall-mucosal thickening of   the stomach. No free intraperitoneal air or fluid collection. A normal APPENDIX   is visualized.     OTHER STRUCTURES: Aorta appears unremarkable without evidence of aortic   aneurysm. No bulky lymph node enlargement.       Impression    IMPRESSION:   1. Findings consistent with ACUTE PANCREATITIS without evidence of pseudocyst.   Recommend correlation with pancreatic enzymes.   2. Wall-mucosal thickening of the stomach suspicious for  gastritis/peptic ulcer   disease.   3. Colon contains moderate amount of fecal matter suggestive of constipation.   4. Other nonemergent/incidental findings as described.     COMMENT:   Suboptimal evaluation of bowel loops and abdominal organs due to lack of   intravenous contrast.     THIS DOCUMENT HAS BEEN ELECTRONICALLY SIGNED BY MILKA MCFARLAND MD   US Abdomen Limited    Narrative    EXAM:    US Abdomen Limited, Right Upper Quadrant     EXAM DATE/TIME:    1/23/2019 6:00 AM     CLINICAL HISTORY:    48 years old, female; Pain; Abdominal pain; Localized; Right upper quadrant   (ruq); Prior surgery; Surgery date: 6+ months; Surgery type: Cholecytectomy. ;   Additional info: Unexplained acute pancreatitis. R/O gal; L blaader stones     TECHNIQUE:    Real-time ultrasound of the abdomen with image documentation. Examination was   focused on the right upper quadrant.     COMPARISON:    CT ABDOMEN PELVIS W CONTRAST 1/23/2019 4:27 AM     FINDINGS:    Liver:  The liver measures 13.5 cm in sagittal dimensions. Normal directional   flow of the portal vein.    Gallbladder:  Status post cholecystectomy.    Common bile duct:  The common bile duct measures 3 mm.    Pancreas:  Pancreas is not well-seen due to overlying bowel gas.    Right kidney:  The right kidney measures 9.8 x 5.4 cm.    Aorta:  The aorta is normal in caliber.       Impression    IMPRESSION:   Status post cholecystectomy.  Otherwise unremarkable right upper quadrant   ultrasound.    THIS DOCUMENT HAS BEEN ELECTRONICALLY SIGNED BY KI SANTIAGO MD

## 2019-01-27 ENCOUNTER — TRANSFERRED RECORDS (OUTPATIENT)
Dept: HEALTH INFORMATION MANAGEMENT | Facility: OTHER | Age: 49
End: 2019-01-27

## 2019-01-28 ENCOUNTER — TRANSFERRED RECORDS (OUTPATIENT)
Dept: HEALTH INFORMATION MANAGEMENT | Facility: CLINIC | Age: 49
End: 2019-01-28

## 2019-01-29 ENCOUNTER — TRANSFERRED RECORDS (OUTPATIENT)
Dept: HEALTH INFORMATION MANAGEMENT | Facility: OTHER | Age: 49
End: 2019-01-29

## 2019-01-29 ENCOUNTER — MEDICAL CORRESPONDENCE (OUTPATIENT)
Dept: HEALTH INFORMATION MANAGEMENT | Facility: CLINIC | Age: 49
End: 2019-01-29

## 2019-02-04 DIAGNOSIS — H53.10 SUBJECTIVE VISUAL DISTURBANCE: Primary | ICD-10-CM

## 2019-02-06 ENCOUNTER — OFFICE VISIT (OUTPATIENT)
Dept: FAMILY MEDICINE | Facility: OTHER | Age: 49
End: 2019-02-06
Attending: NURSE PRACTITIONER
Payer: COMMERCIAL

## 2019-02-06 VITALS
WEIGHT: 176 LBS | TEMPERATURE: 97.5 F | DIASTOLIC BLOOD PRESSURE: 72 MMHG | RESPIRATION RATE: 14 BRPM | SYSTOLIC BLOOD PRESSURE: 138 MMHG | HEIGHT: 61 IN | HEART RATE: 66 BPM | BODY MASS INDEX: 33.23 KG/M2

## 2019-02-06 DIAGNOSIS — Z09 HOSPITAL DISCHARGE FOLLOW-UP: ICD-10-CM

## 2019-02-06 DIAGNOSIS — H46.9 OPTIC NEURITIS: Primary | ICD-10-CM

## 2019-02-06 PROCEDURE — 99213 OFFICE O/P EST LOW 20 MIN: CPT | Performed by: NURSE PRACTITIONER

## 2019-02-06 RX ORDER — HYDROCODONE BITARTRATE AND ACETAMINOPHEN 5; 325 MG/1; MG/1
5-325 TABLET ORAL EVERY 6 HOURS PRN
Refills: 0 | Status: ON HOLD | COMMUNITY
Start: 2019-01-29 | End: 2019-05-29

## 2019-02-06 ASSESSMENT — PAIN SCALES - GENERAL: PAINLEVEL: NO PAIN (1)

## 2019-02-06 ASSESSMENT — MIFFLIN-ST. JEOR: SCORE: 1370.71

## 2019-02-06 NOTE — PROGRESS NOTES
Nursing Notes:   Raul Sharp LPN  2/6/2019 10:55 AM  Signed  Patient presents to clinic today for a hospital follow up from 01/23/2019-01/25/2019 with pancreatitis; symptoms are better.     No LMP recorded. Patient has had a hysterectomy.  Medication Reconciliation: complete    Raul Sharp LPN  2/6/2019 10:44 AM    Nursing note reviewed with patient.  Accurracy and completeness verified.   Ms. Patel is a 48 year old female who:  Patient presents with:  Hospital F/U      ICD-10-CM    1. Optic neuritis H46.9    2. Hospital discharge follow-up Z09      HPI  Presents today for recent hospitalization follow-up for a right optic neuritis which is causing decreased vision sensitivity to light causing severe headaches  Was treated with corticosteroids and developed a pancreatitis--- was transferred to [facility] limited notes available to review  She did see Dr. Neri neurology who recommends consultation at [facility] and she has an appointment--February 13th  I did review hospital notes from [facility] January 23-25--was recommended follow-up for primary care --no labs follow-ups recommended  She reports she is eating taking fluids normally--- vision is limited  She did have a plasmapheresis treatment and there is a dressing on her right anterior chest this was 2 days ago  Was recommended to keep the dressing in place for 72 hours--she reports the nurses did not want to remove the dressing as there was a small clot at the injection site  She reports the dressing has been clean and dry, she is not having any pain or discomfort there is been no fever or chills    She has FMLA paperwork that needs to be redone as there were limited dates on the FMLA that need to be adjusted as she is unable to work at this time due to her limited vision  And has consultation scheduled at [facility] for definitive diagnosis and treatment recommendations  She is very concerned about her right eye vision returning        Review of Systems    Eyes: Positive for visual disturbance.   All other systems reviewed and are negative.     All other systems reviewed and negative.     NOEL:   No flowsheet data found.  PHQ9:  No flowsheet data found.    I have personally reviewed the past medical history, past surgical history, medications, allergies, family and social history as listed below, on 2019.    Allergies   Allergen Reactions     Amoxicillin Nausea and Vomiting     Lactose      Other reaction(s): GI Bleeding       Current Outpatient Medications   Medication Sig Dispense Refill     acetaminophen (TYLENOL) 500 MG tablet Take 2 tablets (1,000 mg) by mouth every 6 hours as needed for mild pain       HYDROcodone-acetaminophen (NORCO) 5-325 MG tablet Take 5-325 tablets by mouth every 6 hours as needed  0     pantoprazole (PROTONIX) 40 MG EC tablet Take 1 tablet (40 mg) by mouth every morning (before breakfast) 30 tablet 0        Patient Active Problem List    Diagnosis Date Noted     Acute pancreatitis 2019     Priority: Medium     Optic neuritis 2019     Priority: Medium     Slow transit constipation 2019     Priority: Medium     Lactose intolerance in adult 2019     Priority: Medium     Family history of diabetes mellitus 2018     Priority: Medium     Pelvic pain 2017     Priority: Medium     Fibrocystic breast disease 2012     Priority: Medium     Past Medical History:   Diagnosis Date     Diffuse cystic mastopathy of breast     No Comments Provided     Family history of diabetes mellitus     No Comments Provided     Gastro-esophageal reflux disease without esophagitis     No Comments Provided     Pain in thoracic spine     No Comments Provided     Past Surgical History:   Procedure Laterality Date      SECTION      96, 99     CHOLECYSTECTOMY           OTHER SURGICAL HISTORY      2012,KSB489,PREMALIG/BENIGN SKIN LESION EXCISION,BREAST LESION     OTHER SURGICAL HISTORY      2017,05610.0,CT  "TLH W TUBES/OVAR 250 G OR LESS     Social History     Socioeconomic History     Marital status:      Spouse name: None     Number of children: None     Years of education: None     Highest education level: None   Social Needs     Financial resource strain: None     Food insecurity - worry: None     Food insecurity - inability: None     Transportation needs - medical: None     Transportation needs - non-medical: None   Occupational History     None   Tobacco Use     Smoking status: Never Smoker     Smokeless tobacco: Never Used   Substance and Sexual Activity     Alcohol use: No     Alcohol/week: 0.0 oz     Comment: rare     Drug use: No     Comment: Drug use: No     Sexual activity: Not Currently     Partners: Male   Other Topics Concern     Parent/sibling w/ CABG, MI or angioplasty before 65F 55M? 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  Spouse, Ruperto, teaches people how to fight fires and has even done this internationally.  .  Has 2 sons, Chuckie, 1/25/1996, Jordy, 2/23/1999.     Family History   Problem Relation Age of Onset     Diabetes Father         Diabetes,Type 2, Diverticulitis age 47      Breast Cancer No family hx of         Cancer-breast       EXAM:   Vitals:    02/06/19 1049   BP: 138/72   BP Location: Right arm   Patient Position: Sitting   Cuff Size: Adult Large   Pulse: 66   Resp: 14   Temp: 97.5  F (36.4  C)   TempSrc: Tympanic   Weight: 79.8 kg (176 lb)   Height: 1.549 m (5' 1\")       Current Pain Score: No Pain (1)     BP Readings from Last 3 Encounters:   02/06/19 138/72   01/25/19 150/80   01/18/19 173/82      Wt Readings from Last 3 Encounters:   02/06/19 79.8 kg (176 lb)   01/24/19 80.9 kg (178 lb 6.4 oz)   01/18/19 78.7 kg (173 lb 9.6 oz)      Estimated body mass index is 33.25 kg/m  as calculated from the following:    Height as of this encounter: 1.549 m (5' 1\").    Weight as of this encounter: 79.8 kg (176 lb). "     Physical Exam   Constitutional: She is oriented to person, place, and time. She appears well-developed and well-nourished.   Cardiovascular: Normal rate.   Pulmonary/Chest: Effort normal.   3 x 3 dressing over injection site right anterior chest, clean and dry   Musculoskeletal: Normal range of motion.   Neurological: She is alert and oriented to person, place, and time.   Skin: Skin is warm.   Psychiatric: She has a normal mood and affect. Her behavior is normal. Judgment and thought content normal.   Nursing note and vitals reviewed.      INVESTIGATIONS:  Results for orders placed or performed during the hospital encounter of 19   CT Abdomen Pelvis w Contrast    Narrative    EXAM:    CT Abdomen and Pelvis With Contrast     EXAM DATE/TIME:    2019 3:35 AM     CLINICAL HISTORY:    48 years old, female; Pain; Abdominal pain; Generalized; Prior surgery;   Surgery date: 6+ months; Surgery type: Cholecystectomy. ; Patient HX:   Abdominal pain and nausea and vomiting. Has HX of colitis and bowel issues.   Hasn't had a good bm since last week. ** patient was scanned approximately 40   minutes after drinking the oral contrast per request of ordering physician. ;   Additional info: Abd pain, gastroenteritis or colitis suspected     TECHNIQUE:    Axial computed tomography images of the abdomen and pelvis with intravenous   contrast.    All CT scans at this facility use at least one of these dose optimization   techniques: automated exposure control; mA and/or kV adjustment per patient   size (includes targeted exams where dose is matched to clinical indication); or   iterative reconstruction.    Coronal and sagittal reformatted images were created and reviewed.     CONTRAST:    50 ml of Omni 240 administered intravenously.     COMPARISON:    CT ABDOMEN PELVIS W 2017 2:55 PM     FINDINGS:     PANCREATICOHEPATOBILIARY: The liver is normal without a focal intrahepatic   mass or abnormality. No  significant intra-or extrahepatic ductal dilation.   Spleen is unremarkable. Cholecystectomy.     Mild diffuse enlargement of the pancreas with mild peripancreatic fat   stranding extending into the anterior pararenal space without evidence of a   pseudocyst.     GENITOURINARY: No adrenal mass. Kidneys are unremarkable without obstructive   renal/ureteric calculi and no hydronephrosis. Urinary bladder is within normal   limits. Uterus is not visualized consistent with hysterectomy. No free fluid in   the pelvis.     GASTROINTESTINAL: A few colonic diverticula. Colon contains moderate amount   of fecal matter suggestive of constipation. Wall thickening of the distal   thoracic esophagus suggestive of esophagitis/reflux. Wall-mucosal thickening of   the stomach. No free intraperitoneal air or fluid collection. A normal APPENDIX   is visualized.     OTHER STRUCTURES: Aorta appears unremarkable without evidence of aortic   aneurysm. No bulky lymph node enlargement.       Impression    IMPRESSION:   1. Findings consistent with ACUTE PANCREATITIS without evidence of pseudocyst.   Recommend correlation with pancreatic enzymes.   2. Wall-mucosal thickening of the stomach suspicious for gastritis/peptic ulcer   disease.   3. Colon contains moderate amount of fecal matter suggestive of constipation.   4. Other nonemergent/incidental findings as described.     COMMENT:   Suboptimal evaluation of bowel loops and abdominal organs due to lack of   intravenous contrast.     THIS DOCUMENT HAS BEEN ELECTRONICALLY SIGNED BY MILKA MCFARLAND MD   US Abdomen Limited    Narrative    EXAM:    US Abdomen Limited, Right Upper Quadrant     EXAM DATE/TIME:    1/23/2019 6:00 AM     CLINICAL HISTORY:    48 years old, female; Pain; Abdominal pain; Localized; Right upper quadrant   (ruq); Prior surgery; Surgery date: 6+ months; Surgery type: Cholecytectomy. ;   Additional info: Unexplained acute pancreatitis. R/O gal; L blaader stones      TECHNIQUE:    Real-time ultrasound of the abdomen with image documentation. Examination was   focused on the right upper quadrant.     COMPARISON:    CT ABDOMEN PELVIS W CONTRAST 1/23/2019 4:27 AM     FINDINGS:    Liver:  The liver measures 13.5 cm in sagittal dimensions. Normal directional   flow of the portal vein.    Gallbladder:  Status post cholecystectomy.    Common bile duct:  The common bile duct measures 3 mm.    Pancreas:  Pancreas is not well-seen due to overlying bowel gas.    Right kidney:  The right kidney measures 9.8 x 5.4 cm.    Aorta:  The aorta is normal in caliber.       Impression    IMPRESSION:   Status post cholecystectomy.  Otherwise unremarkable right upper quadrant   ultrasound.    THIS DOCUMENT HAS BEEN ELECTRONICALLY SIGNED BY KI SANTIAGO MD   CBC with platelets differential   Result Value Ref Range    WBC 11.4 (H) 4.0 - 11.0 10e9/L    RBC Count 4.39 3.8 - 5.2 10e12/L    Hemoglobin 13.6 11.7 - 15.7 g/dL    Hematocrit 39.1 35.0 - 47.0 %    MCV 89 78 - 100 fl    MCH 31.0 26.5 - 33.0 pg    MCHC 34.8 31.5 - 36.5 g/dL    RDW 12.7 10.0 - 15.0 %    Platelet Count 173 150 - 450 10e9/L    Diff Method Automated Method    Comprehensive metabolic panel   Result Value Ref Range    Sodium 136 134 - 144 mmol/L    Potassium 4.4 3.5 - 5.1 mmol/L    Chloride 104 98 - 107 mmol/L    Carbon Dioxide 24 21 - 31 mmol/L    Anion Gap 8 3 - 14 mmol/L    Glucose 131 (H) 70 - 105 mg/dL    Urea Nitrogen 20 7 - 25 mg/dL    Creatinine 0.68 0.60 - 1.20 mg/dL    GFR Estimate >90 >60 mL/min/[1.73_m2]    GFR Estimate If Black >90 >60 mL/min/[1.73_m2]    Calcium 8.8 8.6 - 10.3 mg/dL    Bilirubin Total 0.4 0.3 - 1.0 mg/dL    Albumin 3.4 (L) 3.5 - 5.7 g/dL    Protein Total 5.9 (L) 6.4 - 8.9 g/dL    Alkaline Phosphatase 57 34 - 104 U/L    ALT 14 7 - 52 U/L    AST 10 (L) 13 - 39 U/L   Amylase   Result Value Ref Range    Amylase 252 (H) 29 - 103 U/L   Lipase   Result Value Ref Range    Lipase 1,323 (H) 11 - 82 U/L    Lactic acid   Result Value Ref Range    Lactic Acid 0.5 0.5 - 2.2 mmol/L   Lipid profile   Result Value Ref Range    Cholesterol 131 <200 mg/dL    Triglycerides 85 <150 mg/dL    HDL Cholesterol 46 23 - 92 mg/dL    LDL Cholesterol Calculated 68 <100 mg/dL    Non HDL Cholesterol 85 <130 mg/dL   Hemoglobin A1c   Result Value Ref Range    Hemoglobin A1C 5.7 4.0 - 6.0 %   Basic metabolic panel   Result Value Ref Range    Sodium 140 134 - 144 mmol/L    Potassium 4.8 3.5 - 5.1 mmol/L    Chloride 108 (H) 98 - 107 mmol/L    Carbon Dioxide 28 21 - 31 mmol/L    Anion Gap 4 3 - 14 mmol/L    Glucose 82 70 - 105 mg/dL    Urea Nitrogen 14 7 - 25 mg/dL    Creatinine 0.71 0.60 - 1.20 mg/dL    GFR Estimate 88 >60 mL/min/[1.73_m2]    GFR Estimate If Black >90 >60 mL/min/[1.73_m2]    Calcium 8.6 8.6 - 10.3 mg/dL   CBC with platelets   Result Value Ref Range    WBC 7.1 4.0 - 11.0 10e9/L    RBC Count 3.95 3.8 - 5.2 10e12/L    Hemoglobin 12.1 11.7 - 15.7 g/dL    Hematocrit 37.3 35.0 - 47.0 %    MCV 94 78 - 100 fl    MCH 30.6 26.5 - 33.0 pg    MCHC 32.4 31.5 - 36.5 g/dL    RDW 13.2 10.0 - 15.0 %    Platelet Count 137 (L) 150 - 450 10e9/L   Lipase   Result Value Ref Range    Lipase 185 (H) 11 - 82 U/L   CBC with platelets   Result Value Ref Range    WBC 8.3 4.0 - 11.0 10e9/L    RBC Count 4.43 3.8 - 5.2 10e12/L    Hemoglobin 13.4 11.7 - 15.7 g/dL    Hematocrit 40.6 35.0 - 47.0 %    MCV 92 78 - 100 fl    MCH 30.2 26.5 - 33.0 pg    MCHC 33.0 31.5 - 36.5 g/dL    RDW 12.7 10.0 - 15.0 %    Platelet Count 160 150 - 450 10e9/L   Basic metabolic panel   Result Value Ref Range    Sodium 141 134 - 144 mmol/L    Potassium 4.3 3.5 - 5.1 mmol/L    Chloride 106 98 - 107 mmol/L    Carbon Dioxide 27 21 - 31 mmol/L    Anion Gap 8 3 - 14 mmol/L    Glucose 89 70 - 105 mg/dL    Urea Nitrogen 9 7 - 25 mg/dL    Creatinine 0.72 0.60 - 1.20 mg/dL    GFR Estimate 87 >60 mL/min/[1.73_m2]    GFR Estimate If Black >90 >60 mL/min/[1.73_m2]    Calcium 9.1 8.6 - 10.3  mg/dL   Lipase   Result Value Ref Range    Lipase 75 11 - 82 U/L       ASSESSMENT AND PLAN:  Problem List Items Addressed This Visit        Nervous and Auditory    Optic neuritis - Primary      Other Visit Diagnoses     Hospital discharge follow-up            Completed additional [facility] paperwork as requested--- she will let me know if she needs any further paperwork for her employer excusing her for medical leave    Would prefer that she leaves her dressing on for at least 72 hours--she will return tomorrow afternoon or Friday morning and I will remove the dressing for her    [facility] consultation appointment scheduled February 12 with neurology--      -- Expected clinical course discussed    -- Medications and their side effects discussed    There are no Patient Instructions on file for this visit.  Coby Higgins CNP  [facility]     Portions of this note were dictated using speech recognition software. The note has been proofread but errors in the text may have been overlooked. Please contact me if there are any concerns regarding the accuracy of the dictation.

## 2019-02-06 NOTE — NURSING NOTE
Patient presents to clinic today for a hospital follow up from 01/23/2019-01/25/2019 with pancreatitis; symptoms are better.     No LMP recorded. Patient has had a hysterectomy.  Medication Reconciliation: complete    Raul Sharp LPN  2/6/2019 10:44 AM

## 2019-02-07 ENCOUNTER — TELEPHONE (OUTPATIENT)
Dept: OPHTHALMOLOGY | Facility: CLINIC | Age: 49
End: 2019-02-07

## 2019-02-07 ENCOUNTER — APPOINTMENT (OUTPATIENT)
Dept: FAMILY MEDICINE | Facility: OTHER | Age: 49
End: 2019-02-07
Attending: NURSE PRACTITIONER
Payer: COMMERCIAL

## 2019-02-07 NOTE — TELEPHONE ENCOUNTER
Dr. Kaur appropriate provider per my review  Will forward to neuro-ophthalmology team for further review.  H/o optic neuritis  Chema Medina RN 10:15 AM 02/07/19         Health Call Center    Phone Message    May a detailed message be left on voicemail: yes    Reason for Call: Other: Chema Pts  is wanting to know if this is correct place,  referred them, but Chema is confused as  mentioned [facility], Just wanting to make sure this is best place for his wife to get checked out at, after Dr. Javi Kaur goes over pts records does he feel this is the best place for her? Chema is needing to know how long he needs to plan for travel here, 4 hour drive for them. I told chema that the appts last 2-4 hours as PWB.Chema is wanting to know what the plan is for the appt, what i     Action Taken: Message routed to:  [facility] Eye

## 2019-02-07 NOTE — TELEPHONE ENCOUNTER
Let patient's  know that until Dr. Kaur sees patient would be unable to answer questions about diagnosis or possible diagnosis.  Will obtain records and review at time of appointment.  Will also obtain imaging.  Gave him website to find information about accommodations.  I stated that they should plan to be here for 2-4 hours.

## 2019-02-11 ENCOUNTER — TELEPHONE (OUTPATIENT)
Dept: CALL CENTER | Age: 49
End: 2019-02-11

## 2019-02-11 ENCOUNTER — HOSPITAL ENCOUNTER (EMERGENCY)
Facility: OTHER | Age: 49
Discharge: HOME | End: 2019-02-11
Attending: EMERGENCY MEDICINE | Admitting: EMERGENCY MEDICINE
Payer: COMMERCIAL

## 2019-02-11 VITALS
DIASTOLIC BLOOD PRESSURE: 64 MMHG | HEIGHT: 61 IN | HEART RATE: 55 BPM | RESPIRATION RATE: 14 BRPM | TEMPERATURE: 97.6 F | BODY MASS INDEX: 33.23 KG/M2 | WEIGHT: 176 LBS | OXYGEN SATURATION: 96 % | SYSTOLIC BLOOD PRESSURE: 118 MMHG

## 2019-02-11 DIAGNOSIS — R11.2 NAUSEA AND VOMITING, INTRACTABILITY OF VOMITING NOT SPECIFIED, UNSPECIFIED VOMITING TYPE: ICD-10-CM

## 2019-02-11 DIAGNOSIS — H46.9 OPTIC NEURITIS: ICD-10-CM

## 2019-02-11 LAB
ANION GAP SERPL CALCULATED.3IONS-SCNC: 11 MMOL/L (ref 3–14)
BASOPHILS # BLD AUTO: 0 10E9/L (ref 0–0.2)
BASOPHILS NFR BLD AUTO: 0.5 %
BUN SERPL-MCNC: 13 MG/DL (ref 7–25)
CALCIUM SERPL-MCNC: 10.3 MG/DL (ref 8.6–10.3)
CHLORIDE SERPL-SCNC: 104 MMOL/L (ref 98–107)
CO2 SERPL-SCNC: 25 MMOL/L (ref 21–31)
CREAT SERPL-MCNC: 0.76 MG/DL (ref 0.6–1.2)
DIFFERENTIAL METHOD BLD: NORMAL
EOSINOPHIL # BLD AUTO: 0.1 10E9/L (ref 0–0.7)
EOSINOPHIL NFR BLD AUTO: 1.2 %
ERYTHROCYTE [DISTWIDTH] IN BLOOD BY AUTOMATED COUNT: 13.2 % (ref 10–15)
GFR SERPL CREATININE-BSD FRML MDRD: 82 ML/MIN/{1.73_M2}
GLUCOSE SERPL-MCNC: 145 MG/DL (ref 70–105)
HCT VFR BLD AUTO: 41.7 % (ref 35–47)
HGB BLD-MCNC: 13.8 G/DL (ref 11.7–15.7)
IMM GRANULOCYTES # BLD: 0 10E9/L (ref 0–0.4)
IMM GRANULOCYTES NFR BLD: 0.4 %
LYMPHOCYTES # BLD AUTO: 1.5 10E9/L (ref 0.8–5.3)
LYMPHOCYTES NFR BLD AUTO: 19.4 %
MCH RBC QN AUTO: 30.2 PG (ref 26.5–33)
MCHC RBC AUTO-ENTMCNC: 33.1 G/DL (ref 31.5–36.5)
MCV RBC AUTO: 91 FL (ref 78–100)
MONOCYTES # BLD AUTO: 0.5 10E9/L (ref 0–1.3)
MONOCYTES NFR BLD AUTO: 6.6 %
NEUTROPHILS # BLD AUTO: 5.5 10E9/L (ref 1.6–8.3)
NEUTROPHILS NFR BLD AUTO: 71.9 %
PLATELET # BLD AUTO: 271 10E9/L (ref 150–450)
POTASSIUM SERPL-SCNC: 4.1 MMOL/L (ref 3.5–5.1)
RBC # BLD AUTO: 4.57 10E12/L (ref 3.8–5.2)
SODIUM SERPL-SCNC: 140 MMOL/L (ref 134–144)
WBC # BLD AUTO: 7.6 10E9/L (ref 4–11)

## 2019-02-11 PROCEDURE — 85025 COMPLETE CBC W/AUTO DIFF WBC: CPT | Performed by: EMERGENCY MEDICINE

## 2019-02-11 PROCEDURE — 96375 TX/PRO/DX INJ NEW DRUG ADDON: CPT | Performed by: EMERGENCY MEDICINE

## 2019-02-11 PROCEDURE — 99284 EMERGENCY DEPT VISIT MOD MDM: CPT | Mod: 25 | Performed by: EMERGENCY MEDICINE

## 2019-02-11 PROCEDURE — 99283 EMERGENCY DEPT VISIT LOW MDM: CPT | Mod: Z6 | Performed by: EMERGENCY MEDICINE

## 2019-02-11 PROCEDURE — 80048 BASIC METABOLIC PNL TOTAL CA: CPT | Performed by: EMERGENCY MEDICINE

## 2019-02-11 PROCEDURE — 96374 THER/PROPH/DIAG INJ IV PUSH: CPT | Performed by: EMERGENCY MEDICINE

## 2019-02-11 PROCEDURE — 36415 COLL VENOUS BLD VENIPUNCTURE: CPT | Performed by: EMERGENCY MEDICINE

## 2019-02-11 PROCEDURE — 96361 HYDRATE IV INFUSION ADD-ON: CPT | Performed by: EMERGENCY MEDICINE

## 2019-02-11 PROCEDURE — 25000128 H RX IP 250 OP 636: Performed by: EMERGENCY MEDICINE

## 2019-02-11 RX ORDER — DIPHENHYDRAMINE HYDROCHLORIDE 50 MG/ML
25 INJECTION INTRAMUSCULAR; INTRAVENOUS ONCE
Status: COMPLETED | OUTPATIENT
Start: 2019-02-11 | End: 2019-02-11

## 2019-02-11 RX ORDER — METOCLOPRAMIDE HYDROCHLORIDE 5 MG/ML
10 INJECTION INTRAMUSCULAR; INTRAVENOUS ONCE
Status: COMPLETED | OUTPATIENT
Start: 2019-02-11 | End: 2019-02-11

## 2019-02-11 RX ORDER — KETOROLAC TROMETHAMINE 30 MG/ML
30 INJECTION, SOLUTION INTRAMUSCULAR; INTRAVENOUS ONCE
Status: COMPLETED | OUTPATIENT
Start: 2019-02-11 | End: 2019-02-11

## 2019-02-11 RX ORDER — ONDANSETRON 2 MG/ML
4 INJECTION INTRAMUSCULAR; INTRAVENOUS EVERY 30 MIN PRN
Status: DISCONTINUED | OUTPATIENT
Start: 2019-02-11 | End: 2019-02-11 | Stop reason: HOSPADM

## 2019-02-11 RX ADMIN — ONDANSETRON 4 MG: 2 INJECTION INTRAMUSCULAR; INTRAVENOUS at 09:45

## 2019-02-11 RX ADMIN — DIPHENHYDRAMINE HYDROCHLORIDE 25 MG: 50 INJECTION INTRAMUSCULAR; INTRAVENOUS at 08:44

## 2019-02-11 RX ADMIN — SODIUM CHLORIDE 1000 ML: 900 INJECTION, SOLUTION INTRAVENOUS at 08:44

## 2019-02-11 RX ADMIN — KETOROLAC TROMETHAMINE 30 MG: 30 INJECTION, SOLUTION INTRAMUSCULAR at 08:48

## 2019-02-11 RX ADMIN — METOCLOPRAMIDE 10 MG: 5 INJECTION, SOLUTION INTRAMUSCULAR; INTRAVENOUS at 08:46

## 2019-02-11 ASSESSMENT — ENCOUNTER SYMPTOMS
FEVER: 0
VOMITING: 1
DIARRHEA: 0
CONFUSION: 0
CONSTIPATION: 0
CHEST TIGHTNESS: 0
BACK PAIN: 0
WOUND: 0
HEMATURIA: 0
ABDOMINAL PAIN: 0
HEADACHES: 1
SHORTNESS OF BREATH: 0
NAUSEA: 1
CHILLS: 0
ADENOPATHY: 0
PHOTOPHOBIA: 1
BRUISES/BLEEDS EASILY: 0

## 2019-02-11 ASSESSMENT — MIFFLIN-ST. JEOR: SCORE: 1370.71

## 2019-02-11 NOTE — ED AVS SNAPSHOT
[facility]  [address]  [place] [address]  Phone:  [phone]  Fax:  [phone]                                    Kerrie Patel   MRN: [id number]    Department:  [facility]   Date of Visit:  2/11/2019           After Visit Summary Signature Page    I have received my discharge instructions, and my questions have been answered. I have discussed any challenges I see with this plan with the nurse or doctor.    ..........................................................................................................................................  Patient/Patient Representative Signature      ..........................................................................................................................................  Patient Representative Print Name and Relationship to Patient    ..................................................               ................................................  Date                                   Time    ..........................................................................................................................................  Reviewed by Signature/Title    ...................................................              ..............................................  Date                                               Time          22EPIC Rev 08/18

## 2019-02-11 NOTE — TELEPHONE ENCOUNTER
Kerrie's  called stating they are driving down from [place] today for an appt with Dr. Kaur tomorrow. She has optic neuritis..  Before they left this morning they were in the local ER because Kerrie was in extreme pain and nauseated.  The right eye pain has increased and she does not get relief from hydrocodone.  She has been very nauseated and has not been eating or drinking much.  In the ER she was given IV fluids/zofran/toradol/reglan and benadryl and [facility]    They are on the way down -She is resting in the car  I instructed them to go to the ER when they arrived although I will forward this information to the EYE clinic

## 2019-02-11 NOTE — ED TRIAGE NOTES
"ED Nursing Triage Note (General)   ________________________________    Kerrie Patel is a 48 year old Female that presents to triage private car  With history of  Right eye problems in January that she has been doctoring for and is scheduled for an appt at the [facility] the [facility] tomorrow and was leaving today.  Woke up this am with N/V and severe eye pain,  reported by spouse  Significant symptoms had onset ongoing /90   Pulse 75   Temp 97.6  F (36.4  C) (Tympanic)   Resp 14   Ht 1.549 m (5' 1\")   Wt 79.8 kg (176 lb)   SpO2 94%   Breastfeeding? No   BMI 33.25 kg/m  t  Patient appears alert , in moderate distress., and cooperative behavior.  Anxiety: about pain and nausea  GCS will not allow us to evaluate pupil due to pain  Airway: intact  Breathing noted as Normal.  Circulation Normal  Skin normal  Action taken:  Triage to critical care immediately      PRE HOSPITAL PRIOR LIVING SITUATION Spouse  "
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
## 2019-02-12 ENCOUNTER — TELEPHONE (OUTPATIENT)
Dept: OPHTHALMOLOGY | Facility: CLINIC | Age: 49
End: 2019-02-12

## 2019-02-12 ENCOUNTER — HOSPITAL ENCOUNTER (EMERGENCY)
Facility: CLINIC | Age: 49
Discharge: HOME | End: 2019-02-12
Attending: EMERGENCY MEDICINE | Admitting: EMERGENCY MEDICINE
Payer: COMMERCIAL

## 2019-02-12 ENCOUNTER — OFFICE VISIT (OUTPATIENT)
Dept: OPHTHALMOLOGY | Facility: CLINIC | Age: 49
End: 2019-02-12
Attending: OPHTHALMOLOGY
Payer: COMMERCIAL

## 2019-02-12 VITALS
HEART RATE: 77 BPM | SYSTOLIC BLOOD PRESSURE: 132 MMHG | OXYGEN SATURATION: 98 % | TEMPERATURE: 97.9 F | DIASTOLIC BLOOD PRESSURE: 72 MMHG | RESPIRATION RATE: 18 BRPM | BODY MASS INDEX: 32.66 KG/M2 | WEIGHT: 173 LBS | HEIGHT: 61 IN

## 2019-02-12 DIAGNOSIS — H53.10 SUBJECTIVE VISUAL DISTURBANCE: ICD-10-CM

## 2019-02-12 DIAGNOSIS — H46.9 OPTIC NEURITIS: ICD-10-CM

## 2019-02-12 DIAGNOSIS — H05.10 IDIOPATHIC ORBITAL INFLAMMATORY SYNDROME: ICD-10-CM

## 2019-02-12 DIAGNOSIS — H05.10 IDIOPATHIC ORBITAL INFLAMMATORY SYNDROME: Primary | ICD-10-CM

## 2019-02-12 DIAGNOSIS — H53.10 SUBJECTIVE VISUAL DISTURBANCE: Primary | ICD-10-CM

## 2019-02-12 LAB
ALBUMIN SERPL-MCNC: 4.1 G/DL (ref 3.4–5)
ALBUMIN UR-MCNC: NEGATIVE MG/DL
ALP SERPL-CCNC: 65 U/L (ref 40–150)
ALT SERPL W P-5'-P-CCNC: 68 U/L (ref 0–50)
ANION GAP SERPL CALCULATED.3IONS-SCNC: 7 MMOL/L (ref 3–14)
APPEARANCE UR: CLEAR
APTT PPP: 27 SEC (ref 22–37)
AST SERPL W P-5'-P-CCNC: 20 U/L (ref 0–45)
BACTERIA #/AREA URNS HPF: ABNORMAL /HPF
BASOPHILS # BLD AUTO: 0 10E9/L (ref 0–0.2)
BASOPHILS NFR BLD AUTO: 0.6 %
BILIRUB SERPL-MCNC: 0.3 MG/DL (ref 0.2–1.3)
BILIRUB UR QL STRIP: NEGATIVE
BUN SERPL-MCNC: 14 MG/DL (ref 7–30)
CALCIUM SERPL-MCNC: 9.4 MG/DL (ref 8.5–10.1)
CHLORIDE SERPL-SCNC: 110 MMOL/L (ref 94–109)
CO2 SERPL-SCNC: 28 MMOL/L (ref 20–32)
COLOR UR AUTO: YELLOW
CREAT SERPL-MCNC: 0.8 MG/DL (ref 0.52–1.04)
CRP SERPL-MCNC: <2.9 MG/L (ref 0–8)
DIFFERENTIAL METHOD BLD: NORMAL
EOSINOPHIL # BLD AUTO: 0.1 10E9/L (ref 0–0.7)
EOSINOPHIL NFR BLD AUTO: 1.7 %
ERYTHROCYTE [DISTWIDTH] IN BLOOD BY AUTOMATED COUNT: 13.8 % (ref 10–15)
ERYTHROCYTE [SEDIMENTATION RATE] IN BLOOD BY WESTERGREN METHOD: 8 MM/H (ref 0–20)
GFR SERPL CREATININE-BSD FRML MDRD: 87 ML/MIN/{1.73_M2}
GLUCOSE SERPL-MCNC: 135 MG/DL (ref 70–99)
GLUCOSE UR STRIP-MCNC: NEGATIVE MG/DL
HCT VFR BLD AUTO: 38.4 % (ref 35–47)
HGB BLD-MCNC: 12.6 G/DL (ref 11.7–15.7)
HGB UR QL STRIP: ABNORMAL
IMM GRANULOCYTES # BLD: 0 10E9/L (ref 0–0.4)
IMM GRANULOCYTES NFR BLD: 0.3 %
INR PPP: 0.98 (ref 0.86–1.14)
KETONES UR STRIP-MCNC: 5 MG/DL
LEUKOCYTE ESTERASE UR QL STRIP: ABNORMAL
LIPASE SERPL-CCNC: 150 U/L (ref 73–393)
LYMPHOCYTES # BLD AUTO: 2 10E9/L (ref 0.8–5.3)
LYMPHOCYTES NFR BLD AUTO: 28.7 %
MCH RBC QN AUTO: 31 PG (ref 26.5–33)
MCHC RBC AUTO-ENTMCNC: 32.8 G/DL (ref 31.5–36.5)
MCV RBC AUTO: 95 FL (ref 78–100)
MONOCYTES # BLD AUTO: 0.6 10E9/L (ref 0–1.3)
MONOCYTES NFR BLD AUTO: 9 %
MUCOUS THREADS #/AREA URNS LPF: PRESENT /LPF
NEUTROPHILS # BLD AUTO: 4.2 10E9/L (ref 1.6–8.3)
NEUTROPHILS NFR BLD AUTO: 59.7 %
NITRATE UR QL: NEGATIVE
NRBC # BLD AUTO: 0 10*3/UL
NRBC BLD AUTO-RTO: 0 /100
PH UR STRIP: 5 PH (ref 5–7)
PLATELET # BLD AUTO: 272 10E9/L (ref 150–450)
POTASSIUM SERPL-SCNC: 3.8 MMOL/L (ref 3.4–5.3)
PROT SERPL-MCNC: 6.8 G/DL (ref 6.8–8.8)
RBC # BLD AUTO: 4.06 10E12/L (ref 3.8–5.2)
RBC #/AREA URNS AUTO: 4 /HPF (ref 0–2)
SODIUM SERPL-SCNC: 145 MMOL/L (ref 133–144)
SOURCE: ABNORMAL
SP GR UR STRIP: 1.02 (ref 1–1.03)
SQUAMOUS #/AREA URNS AUTO: 2 /HPF (ref 0–1)
UROBILINOGEN UR STRIP-MCNC: 0 MG/DL (ref 0–2)
VIT B12 SERPL-MCNC: 353 PG/ML (ref 193–986)
WBC # BLD AUTO: 7 10E9/L (ref 4–11)
WBC #/AREA URNS AUTO: 7 /HPF (ref 0–5)

## 2019-02-12 PROCEDURE — 83876 ASSAY MYELOPEROXIDASE: CPT | Performed by: OPHTHALMOLOGY

## 2019-02-12 PROCEDURE — 85610 PROTHROMBIN TIME: CPT | Performed by: EMERGENCY MEDICINE

## 2019-02-12 PROCEDURE — 36415 COLL VENOUS BLD VENIPUNCTURE: CPT | Performed by: OPHTHALMOLOGY

## 2019-02-12 PROCEDURE — 96374 THER/PROPH/DIAG INJ IV PUSH: CPT | Performed by: EMERGENCY MEDICINE

## 2019-02-12 PROCEDURE — 92133 CPTRZD OPH DX IMG PST SGM ON: CPT | Mod: ZF | Performed by: OPHTHALMOLOGY

## 2019-02-12 PROCEDURE — 82607 VITAMIN B-12: CPT | Performed by: EMERGENCY MEDICINE

## 2019-02-12 PROCEDURE — 84999 UNLISTED CHEMISTRY PROCEDURE: CPT

## 2019-02-12 PROCEDURE — 99284 EMERGENCY DEPT VISIT MOD MDM: CPT | Mod: 25 | Performed by: EMERGENCY MEDICINE

## 2019-02-12 PROCEDURE — 82787 IGG 1 2 3 OR 4 EACH: CPT | Performed by: OPHTHALMOLOGY

## 2019-02-12 PROCEDURE — 25000128 H RX IP 250 OP 636: Performed by: EMERGENCY MEDICINE

## 2019-02-12 PROCEDURE — 96375 TX/PRO/DX INJ NEW DRUG ADDON: CPT | Performed by: EMERGENCY MEDICINE

## 2019-02-12 PROCEDURE — 85652 RBC SED RATE AUTOMATED: CPT | Performed by: EMERGENCY MEDICINE

## 2019-02-12 PROCEDURE — 80053 COMPREHEN METABOLIC PANEL: CPT | Performed by: EMERGENCY MEDICINE

## 2019-02-12 PROCEDURE — 86038 ANTINUCLEAR ANTIBODIES: CPT | Performed by: OPHTHALMOLOGY

## 2019-02-12 PROCEDURE — 82784 ASSAY IGA/IGD/IGG/IGM EACH: CPT | Performed by: OPHTHALMOLOGY

## 2019-02-12 PROCEDURE — 92083 EXTENDED VISUAL FIELD XM: CPT | Mod: ZF | Performed by: OPHTHALMOLOGY

## 2019-02-12 PROCEDURE — 86255 FLUORESCENT ANTIBODY SCREEN: CPT | Performed by: EMERGENCY MEDICINE

## 2019-02-12 PROCEDURE — 85025 COMPLETE CBC W/AUTO DIFF WBC: CPT | Performed by: EMERGENCY MEDICINE

## 2019-02-12 PROCEDURE — 83690 ASSAY OF LIPASE: CPT | Performed by: EMERGENCY MEDICINE

## 2019-02-12 PROCEDURE — 83516 IMMUNOASSAY NONANTIBODY: CPT | Performed by: OPHTHALMOLOGY

## 2019-02-12 PROCEDURE — 86140 C-REACTIVE PROTEIN: CPT | Performed by: EMERGENCY MEDICINE

## 2019-02-12 PROCEDURE — G0463 HOSPITAL OUTPT CLINIC VISIT: HCPCS | Mod: ZF

## 2019-02-12 PROCEDURE — 86256 FLUORESCENT ANTIBODY TITER: CPT

## 2019-02-12 PROCEDURE — 99284 EMERGENCY DEPT VISIT MOD MDM: CPT | Mod: Z6 | Performed by: EMERGENCY MEDICINE

## 2019-02-12 PROCEDURE — 85730 THROMBOPLASTIN TIME PARTIAL: CPT | Performed by: EMERGENCY MEDICINE

## 2019-02-12 RX ORDER — ONDANSETRON 4 MG/1
4 TABLET, ORALLY DISINTEGRATING ORAL EVERY 8 HOURS PRN
Qty: 20 TABLET | Refills: 1 | Status: SHIPPED | OUTPATIENT
Start: 2019-02-12 | End: 2019-02-28

## 2019-02-12 RX ORDER — TRAMADOL HYDROCHLORIDE 50 MG/1
50 TABLET ORAL EVERY 4 HOURS PRN
Qty: 12 TABLET | Refills: 0 | Status: SHIPPED | OUTPATIENT
Start: 2019-02-12 | End: 2019-03-27

## 2019-02-12 RX ORDER — KETOROLAC TROMETHAMINE 30 MG/ML
30 INJECTION, SOLUTION INTRAMUSCULAR; INTRAVENOUS ONCE
Status: COMPLETED | OUTPATIENT
Start: 2019-02-12 | End: 2019-02-12

## 2019-02-12 RX ORDER — DIPHENHYDRAMINE HYDROCHLORIDE 50 MG/ML
25 INJECTION INTRAMUSCULAR; INTRAVENOUS ONCE
Status: COMPLETED | OUTPATIENT
Start: 2019-02-12 | End: 2019-02-12

## 2019-02-12 RX ORDER — METOCLOPRAMIDE HYDROCHLORIDE 5 MG/ML
10 INJECTION INTRAMUSCULAR; INTRAVENOUS ONCE
Status: COMPLETED | OUTPATIENT
Start: 2019-02-12 | End: 2019-02-12

## 2019-02-12 RX ORDER — PREDNISONE 20 MG/1
TABLET ORAL
Qty: 84 TABLET | Refills: 0 | Status: ON HOLD | OUTPATIENT
Start: 2019-02-12 | End: 2019-03-06

## 2019-02-12 RX ADMIN — DIPHENHYDRAMINE HYDROCHLORIDE 25 MG: 50 INJECTION INTRAMUSCULAR; INTRAVENOUS at 01:40

## 2019-02-12 RX ADMIN — KETOROLAC TROMETHAMINE 30 MG: 30 INJECTION INTRAMUSCULAR; INTRAVENOUS at 01:41

## 2019-02-12 RX ADMIN — METOCLOPRAMIDE 10 MG: 5 INJECTION, SOLUTION INTRAMUSCULAR; INTRAVENOUS at 01:40

## 2019-02-12 ASSESSMENT — VISUAL ACUITY
OD_CC: HM
OS_CC: 20/20
METHOD: SNELLEN - LINEAR
CORRECTION_TYPE: GLASSES

## 2019-02-12 ASSESSMENT — REFRACTION_WEARINGRX
OS_SPHERE: -8.00
OD_AXIS: 100
OD_SPHERE: -7.75
OS_AXIS: 077
OS_CYLINDER: +1.50
OD_CYLINDER: +1.00

## 2019-02-12 ASSESSMENT — EXTERNAL EXAM - LEFT EYE: OS_EXAM: NORMAL

## 2019-02-12 ASSESSMENT — SLIT LAMP EXAM - LIDS
COMMENTS: NORMAL
COMMENTS: NORMAL

## 2019-02-12 ASSESSMENT — CONF VISUAL FIELD
OD_SUPERIOR_TEMPORAL_RESTRICTION: 1
OD_INFERIOR_NASAL_RESTRICTION: 1
OD_SUPERIOR_NASAL_RESTRICTION: 1
OS_NORMAL: 1
OD_INFERIOR_TEMPORAL_RESTRICTION: 1

## 2019-02-12 ASSESSMENT — CUP TO DISC RATIO
OD_RATIO: 0.2
OS_RATIO: 0.4

## 2019-02-12 ASSESSMENT — MARGIN REFLEX DISTANCE
OD_MRD1: 2
OS_MRD1: 4

## 2019-02-12 ASSESSMENT — ENCOUNTER SYMPTOMS
EYE PAIN: 1
COUGH: 0
PHOTOPHOBIA: 1
FEVER: 0

## 2019-02-12 ASSESSMENT — MIFFLIN-ST. JEOR: SCORE: 1357.1

## 2019-02-12 ASSESSMENT — TONOMETRY
IOP_METHOD: ICARE
OS_IOP_MMHG: 18
OD_IOP_MMHG: 19
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
## 2019-02-12 NOTE — TELEPHONE ENCOUNTER
Pt/ calling about prednisone dosing    Symptoms started in january  Pt will start 80 mg today, was told to take in AM to avoid insomnia    Reviewed generally recommend in AM and if able to start (tolerate symptoms) may do so tomorrow    Pt in pain and reviewed may use this late afternoon.    Reviewed to start decreasing time by one hour to get on schedule to take in AM    If having trouble with sleep to contact us to review further the dosing times    Ruperto Medina RN 4:34 PM 02/12/19    Note to dr. Mckinley

## 2019-02-12 NOTE — NURSING NOTE
Patient presents for new RE Optic Neuritis evaluation w/ Subjective visual disturbance referred by Dr. Ac Neri w/[facility] & OCT RNFL. The current vision is blurred, cant really see just a hint of light in a corner of the RE. The vision in the LE is slightly blurred, tired- possibly overcompensating but stable. Moderate to severe pain resulting in ER visits, ER last night- discomfort, new redness with liquid film over vision. itching from medication. RE has flashes, floaters during evening trying to sleep or eyes closed. No eye drops. No eye sx/inj. Symptoms started 1/18/19 with migraine and awaking next morning to no vision- sound like occlusion pr tech. Rose Marie Banegas COT 12:45 PM February 12, 2019

## 2019-02-12 NOTE — PROGRESS NOTES
Assessment & Plan     Kerrie Patel is a 48 year old female with the following diagnoses:   1. Idiopathic orbital inflammatory syndrome    2. Subjective visual disturbance           Kerrie Patel is a 48 year old female with complaints of vision loss right eye since January 18, 2019.  Admitted to the hospital on 1/23/19 for pancreatitis which was thought to be secondary to the high dose steroids.  Continued headaches and was at the emergency room last evening.  Had IVIG done on Monday.  Given Tramadol and Toradol yesterday to start with pain.  Denies tinnitus.  Had a fever once in the hospital.  Pain improved slightly after a few days of IV steroids, but not right away.       Visual acuity today hand motion RIGHT eye and 20/20 left eye.  Afferent pupillary defect RIGHT eye.  There is no anterior chamber cell right eye. She has mild redness and edema RIGHT eye.   She has chemosis and 1+ injection RIGHT eye.  There are vitreous cells in the RIGHT eye.  She is limited in elevation of the right eye.  Reduced sensation on V2.       I personally reviewed patient's MRI and this showed enhancement of the right optic nerve, sheath, and orbital fat.  The sinuses looked fairly good.     She appears to have idiopathic orbital inflammatory syndrome RIGHT eye.  It is conceivable that this is MOG related so will obtain labs.  However, MOG does not typically cause eyelid edema, chemosis, ptosis, and elevator deficits.  Will also obtain testing for ANCA, ACE, Vasculitis panel, Urinalysis, KIM, Treponema, IgG4.  Spoke to pharmacy.  They say that there are so few cases of pancreatitis from steroids that they cannot say that what her risk is for recurrence.  Previously she received 4 grams in 48 hours intravenous. We discussed that her risk of recurrence is not zero but is likely very small.      Will have patient start on prednisone 80 mg for 7 days then 60 mg for 7 days, 40 mg for 7 days, 30 mg for 7 days, 20 mg for 7 days, then 10  mg for 7 days.  Sides of prednisone discussed.  Patient will call us with an update in about a week since she lives so far away.  We can discuss further planning at that time.  Follow up in 8 weeks or sooner as needed for worsening symptoms   Prognosis guarded.                   Attending Physician Attestation:  Complete documentation of historical and exam elements from today's encounter can be found in the full encounter summary report (not reduplicated in this progress note).  I personally obtained the chief complaint(s) and history of present illness.  I confirmed and edited as necessary the review of systems, past medical/surgical history, family history, social history, and examination findings as documented by others; and I examined the patient myself.  I personally reviewed the relevant tests, images, and reports as documented above.  I formulated and edited as necessary the assessment and plan and discussed the findings and management plan with the patient and family. - Javi Chauhan MD  PGY-3 Ophthalmology Resident  [phone]

## 2019-02-12 NOTE — ED AVS SNAPSHOT
[facility], [place], Emergency Department  [address] [place] [id number]  Phone:  [phone]                                    Kerrie Patel   MRN: [id number]    Department:  [facility], Emergency Department   Date of Visit:  2/12/2019           After Visit Summary Signature Page    I have received my discharge instructions, and my questions have been answered. I have discussed any challenges I see with this plan with the nurse or doctor.    ..........................................................................................................................................  Patient/Patient Representative Signature      ..........................................................................................................................................  Patient Representative Print Name and Relationship to Patient    ..................................................               ................................................  Date                                   Time    ..........................................................................................................................................  Reviewed by Signature/Title    ...................................................              ..............................................  Date                                               Time          22EPIC Rev 08/18

## 2019-02-12 NOTE — DISCHARGE INSTRUCTIONS
Follow-up with Dr. Kaur neuro ophthalmology tomorrow at your previously scheduled appointment.  Return the emergency department as needed for any new or worsening symptoms.

## 2019-02-12 NOTE — ED PROVIDER NOTES
"      [place] EMERGENCY DEPARTMENT ([facility])  February 12, 2019    History     Chief Complaint   Patient presents with     Eye Pain     HPI  Kerrie Patle is a 48 year old female with history notable for recent diagnosis of optic neuritis who presents the ED with right eye pain.  Per chart review, patient was initially evaluated on 1/18/19 for loss of vision in right eye.  MR of the brain showed abnormal enhancement of the retro bulbar, intraorbital segment of the right optic nerve compatible with optic neuritis.  She was then treated with high-dose IV steroid treatment at [facility].  However, patient developed a subsequent pancreatitis likely due to the steroids.  She did receive multiple plasmapheresis treatments and was also seen in the ED yesterday for nausea vomiting and right eye pain.  They treated her with IV fluids, Toradol, Reglan, and Benadryl with improvement of her symptoms.  She does have follow-up with neuro-ophthalmologist here tomorrow.    Patient states that around 10 PM this evening he started to have right eye pain again to the point where she does not want to open her eye and has photophobia.  She does currently denies any fevers, cough, or other medical problems.    I have reviewed the Medications, Allergies, Past Medical and Surgical History, and Social History in the Epic system.    Review of Systems   Constitutional: Negative for fever.   Eyes: Positive for photophobia and pain (R).   Respiratory: Negative for cough.    All other systems reviewed and are negative.      Physical Exam   BP: 144/89  Pulse: 92  Temp: 97.9  F (36.6  C)  Resp: 18  Height: 154.9 cm (5' 1\")  Weight: 78.5 kg (173 lb)  SpO2: 94 %      Physical Exam   Constitutional: She appears well-developed and well-nourished. No distress.   HENT:   Head: Normocephalic.   Eyes: EOM are normal.   Right eye: Mild scleral edema, patient reports no vision in this eye right eye.  Extraocular muscle function intact.  Right " pupil sluggish in comparison to left   Neck: Neck supple.   Pulmonary/Chest: No respiratory distress.   Abdominal: She exhibits no distension.   Musculoskeletal: She exhibits no deformity.   Neurological: She is alert.   Skin: Skin is dry.   Nursing note and vitals reviewed.      ED Course        Procedures   12:47 AM  The patient was seen and examined by Dr. Hoover in Room 23.              Labs Ordered and Resulted from Time of ED Arrival Up to the Time of Departure from the ED   COMPREHENSIVE METABOLIC PANEL - Abnormal; Notable for the following components:       Result Value    Sodium 145 (*)     Chloride 110 (*)     Glucose 135 (*)     ALT 68 (*)     All other components within normal limits   CBC WITH PLATELETS DIFFERENTIAL   INR   PARTIAL THROMBOPLASTIN TIME   LIPASE   CRP INFLAMMATION   VITAMIN B12   ERYTHROCYTE SEDIMENTATION RATE AUTO   NEUROMYELITIS OPTICA AQP4 IGG W REFLEX BLOOD   PERIPHERAL IV CATHETER       Consults  Neurology: Called (02/12/19 0056)   Results for orders placed or performed during the hospital encounter of 02/12/19   CBC with platelets differential   Result Value Ref Range    WBC 7.0 4.0 - 11.0 10e9/L    RBC Count 4.06 3.8 - 5.2 10e12/L    Hemoglobin 12.6 11.7 - 15.7 g/dL    Hematocrit 38.4 35.0 - 47.0 %    MCV 95 78 - 100 fl    MCH 31.0 26.5 - 33.0 pg    MCHC 32.8 31.5 - 36.5 g/dL    RDW 13.8 10.0 - 15.0 %    Platelet Count 272 150 - 450 10e9/L    Diff Method Automated Method     % Neutrophils 59.7 %    % Lymphocytes 28.7 %    % Monocytes 9.0 %    % Eosinophils 1.7 %    % Basophils 0.6 %    % Immature Granulocytes 0.3 %    Nucleated RBCs 0 0 /100    Absolute Neutrophil 4.2 1.6 - 8.3 10e9/L    Absolute Lymphocytes 2.0 0.8 - 5.3 10e9/L    Absolute Monocytes 0.6 0.0 - 1.3 10e9/L    Absolute Eosinophils 0.1 0.0 - 0.7 10e9/L    Absolute Basophils 0.0 0.0 - 0.2 10e9/L    Abs Immature Granulocytes 0.0 0 - 0.4 10e9/L    Absolute Nucleated RBC 0.0    INR   Result Value Ref Range    INR 0.98  0.86 - 1.14   Partial thromboplastin time   Result Value Ref Range    PTT 27 22 - 37 sec   Comprehensive metabolic panel   Result Value Ref Range    Sodium 145 (H) 133 - 144 mmol/L    Potassium 3.8 3.4 - 5.3 mmol/L    Chloride 110 (H) 94 - 109 mmol/L    Carbon Dioxide 28 20 - 32 mmol/L    Anion Gap 7 3 - 14 mmol/L    Glucose 135 (H) 70 - 99 mg/dL    Urea Nitrogen 14 7 - 30 mg/dL    Creatinine 0.80 0.52 - 1.04 mg/dL    GFR Estimate 87 >60 mL/min/[1.73_m2]    GFR Estimate If Black >90 >60 mL/min/[1.73_m2]    Calcium 9.4 8.5 - 10.1 mg/dL    Bilirubin Total 0.3 0.2 - 1.3 mg/dL    Albumin 4.1 3.4 - 5.0 g/dL    Protein Total 6.8 6.8 - 8.8 g/dL    Alkaline Phosphatase 65 40 - 150 U/L    ALT 68 (H) 0 - 50 U/L    AST 20 0 - 45 U/L   Lipase   Result Value Ref Range    Lipase 150 73 - 393 U/L         Assessments & Plan (with Medical Decision Making)   48-year-old female presents with a chief complaint of eye pain.  She is a recent MRI confirmed diagnosis of optic neuritis.  Surgery includes but not limited to optic neuritis, migraine headache, multiple sclerosis.  Patient's pain controlled with a combination of Reglan, Benadryl, Toradol.  Patient had required plasmapheresis for treatment of this at recent hospitalization.  Discussed with neurology who assessed the patient.  Her pain is currently controlled.  She does have an appointment with neuro-ophthalmology tomorrow at noon.  As she is now doing better and has this minute appointment they recommend discharge so she can see her neuro-ophthalmologist.  Patient is comfortable with this plan.  We will give her prescription for nausea medication as well as alternative pain control as the hydrocodone she was previously prescribed causes nausea.  I have reviewed the nursing notes.    I have reviewed the findings, diagnosis, plan and need for follow up with the patient.       Medication List      Started    ondansetron 4 MG ODT tab  Commonly known as:  ZOFRAN ODT  4 mg, Oral,  EVERY 8 HOURS PRN     traMADol 50 MG tablet  Commonly known as:  ULTRAM  50 mg, Oral, EVERY 4 HOURS PRN            Final diagnoses:   Optic neuritis     IDemian, am serving as a trained medical scribe to document services personally performed by  Pantera Hoover DO, based on the provider's statements to me.      Pantera MCARTHUR DO, was physically present and have reviewed and verified the accuracy of this note documented by Demian Fish.     2/12/2019   [facility], [place], EMERGENCY DEPARTMENT     Pantera Hoover DO  02/12/19 0328

## 2019-02-12 NOTE — ED TRIAGE NOTES
Pt comes in with right eye pain.  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  Pt reports nausea and vomiting yesterday, which was treated in [place].  Pt states she has an appointment with an opthomologist here at the  at 1230pm on 2/12.

## 2019-02-12 NOTE — LETTER
2019         RE:  :  MRN: Kerrie Patel  1970  [id number]     Dear Dr. Neri,    Thank you for asking me to see your very pleasant patient, Kerrie Paetl, in neuro-ophthalmic consultation.  I would like to thank you for sending your records and I have summarized them in the history of present illness. She presented with her spouse who provided additional history.  My assessment and plan are below.  For further details, please see my attached clinic note.      Assessment & Plan     Kerrie Patel is a 48 year old female with the following diagnoses:   1. Idiopathic orbital inflammatory syndrome    2. Subjective visual disturbance       Kerrie Patel is a 48 year old female with complaints of vision loss right eye since 2019.  Admitted to the hospital on 19 for pancreatitis which was thought to be secondary to the high dose steroids.  Continued headaches and was at the emergency room last evening.  Had IVIG done on Monday.  Given Tramadol and Toradol yesterday to start with pain.  Denies tinnitus.  Had a fever once in the hospital.  Pain improved slightly after a few days of IV steroids, but not right away.       Visual acuity today hand motion RIGHT eye and 20/20 left eye.  Afferent pupillary defect RIGHT eye.  There is no anterior chamber cell right eye. She has mild redness and edema RIGHT eye.   She has chemosis and 1+ injection RIGHT eye.  There are vitreous cells in the RIGHT eye.  She is limited in elevation of the right eye.  Reduced sensation on V2.       I personally reviewed patient's MRI and this showed enhancement of the right optic nerve, sheath, and orbital fat.  The sinuses looked fairly good.     She appears to have idiopathic orbital inflammatory syndrome RIGHT eye.  It is conceivable that this is MOG related so will obtain labs.  However, MOG does not typically cause eyelid edema, chemosis, ptosis, and elevator deficits.  Will also obtain testing for ANCA, ACE, Vasculitis  panel, Urinalysis, KIM, Treponema, IgG4.  Spoke to pharmacy.  They say that there are so few cases of pancreatitis from steroids that they cannot say that what her risk is for recurrence.  Previously she received 4 grams in 48 hours intravenous. We discussed that her risk of recurrence is not zero but is likely very small.    Will have patient start on prednisone 80 mg for 7 days then 60 mg for 7 days, 40 mg for 7 days, 30 mg for 7 days, 20 mg for 7 days, then 10 mg for 7 days.  Sides of prednisone discussed.  Patient will call us with an update in about a week since she lives so far away.  We can discuss further planning at that time.  Follow up in 8 weeks or sooner as needed for worsening symptoms   Prognosis guarded.     Again, thank you for allowing me to participate in the care of your patient.      Sincerely,    Javi Kaur MD  Professor  Ophthalmology Residency   Director of Neuro-Ophthalmology  [employer] Endowed Chair  Departments of Ophthalmology, Neurology, and Neurosurgery  [facility] [address] [place] [address]  [address]  T - [phone]   - [phone]  RICKY [email or web address]      CC: Ac Neri MD  [facility]  [address] [facility] [address]  VIA Facsimile: [phone]     Dali Diaz MD  [address]  [facility] [address]  VIA In Basket       DX = idiopathic orbital inflammatory syndrome

## 2019-02-13 LAB
ACE SERPL-CCNC: 18 U/L (ref 9–67)
ANA SER QL IF: NEGATIVE
MISCELLANEOUS TEST: NORMAL
MYELOPEROXIDASE AB SER-ACNC: <0.2 AI (ref 0–0.9)
PROTEINASE3 IGG SER-ACNC: <0.2 AI (ref 0–0.9)
T PALLIDUM AB SER QL: NONREACTIVE

## 2019-02-14 LAB — AQP4 H2O CHANNEL IGG TITR SERPL IF: NORMAL {TITER}

## 2019-02-14 NOTE — RESULT ENCOUNTER NOTE
Your treponema and ACE labs were normal.  Your urinalysis showed some changes, but I was really looking for protein.  If you do not have symptoms of a urinary tract infection, then  I am not concerned by the findings.     I am waiting on a few more tests.  How is your pain and your vision?      Thank you for allowing me to share in your care.     Javi Kaur MD

## 2019-02-14 NOTE — RESULT ENCOUNTER NOTE
Kerrie,     Your KIM and vasculitis panel were normal.      I am waiting on a couple more tests.     Thank you for allowing me to share in your care.     Javi Kaur MD

## 2019-02-15 LAB
IGG SERPL-MCNC: 460 MG/DL (ref 695–1620)
IGG1 SER-MCNC: 270 MG/DL (ref 300–856)
IGG2 SER-MCNC: 141 MG/DL (ref 158–761)
IGG3 SER-MCNC: 23 MG/DL (ref 24–192)
IGG4 SER-MCNC: 2 MG/DL (ref 11–86)

## 2019-02-18 ENCOUNTER — TELEPHONE (OUTPATIENT)
Dept: OPHTHALMOLOGY | Facility: CLINIC | Age: 49
End: 2019-02-18

## 2019-02-18 LAB
ANCA AB PATTERN SER IF-IMP: NORMAL
C-ANCA TITR SER IF: NORMAL {TITER}

## 2019-02-18 NOTE — TELEPHONE ENCOUNTER
Called patient. No answer. Left voicemail. Will try calling again tomorrow.    Will Chauhan MD  PGY-3 Ophthalmology Resident  [phone]

## 2019-02-20 ENCOUNTER — TELEPHONE (OUTPATIENT)
Dept: OPHTHALMOLOGY | Facility: CLINIC | Age: 49
End: 2019-02-20

## 2019-02-20 DIAGNOSIS — H46.9 OPTIC NEURITIS: Primary | ICD-10-CM

## 2019-02-20 LAB
RESULT: NORMAL
SEND OUTS MISC TEST CODE: NORMAL
SEND OUTS MISC TEST SPECIMEN: NORMAL
TEST NAME: NORMAL

## 2019-02-20 NOTE — RESULT ENCOUNTER NOTE
Kerrie,     Your MOG antibody came back positive.  I would like to refer you to one of our multiple sclerosis specialists who also take care of MOG.  I will have my assistant help you set up an appointment with Dr. Mazariegos, Lachelle, Maximilian, or Yung.      Thank you for allowing me to share in your care.     Javi Kaur MD

## 2019-02-20 NOTE — TELEPHONE ENCOUNTER
Spoke to at 0931  Kerrie Patel [phone]    Pt states vision improving in right eye-- able to see more light and can kind of see fingers in vision    Pt has not seen neurologist formally and wonders if needs to f/u with neurologist or if Dr. Kaur able to continue care.    Last MOG titer 2-12-19 send out positive per epic    Note to Dr. Mckinley to assist in review of lab test and plan of care for upcoming appts-- does pt need to follow up with neurology also?  Ruperto Medina RN 9:40 AM 02/21/19          Left message with direct number to review seeing two very small spots of light in right eye    Ruperto Medina RN 4:40 PM 02/20/19         [facility]    Phone Message    May a detailed message be left on voicemail: yes    Reason for Call: Other: Dr Kaur wanted the Pt to call and update him. The Steroids are fine this time, there are no symptoms or pancreatitis and she is now seeing two very small spots of light in the Rt eye. Please call her if any questions or to get more details the no. above - She does have a question about weather or not to scheduling another neurology appt. At this time.    Action Taken: Message routed to:  [facility] ([facility] see above

## 2019-02-20 NOTE — TELEPHONE ENCOUNTER
[facility] Call Center    Phone Message    May a detailed message be left on voicemail: yes    Reason for Call: Symptoms or Concerns     If patient has red-flag symptoms, warm transfer to triage line    Current symptom or concern:     Symptoms have been present for: {DAYS/WEEK(S)/MONTH(S):944832}    Has patient previously been seen for this? {NO OR YES:567590}    By {provider's name}: ***    Date: ***    Are there any new or worsening symptoms? {NO/YES:311834}      Action Taken: [facility]

## 2019-02-20 NOTE — TELEPHONE ENCOUNTER
Left message for patient to call me to discuss positive MOG antibody test.  Will have her see one of the demyelinating disease doctors at the [facility]

## 2019-02-21 NOTE — TELEPHONE ENCOUNTER
Left another voicemail for patient to discuss results of labs with positive MOG.  Will need to get set up neurology.  Also Dr. Kaur would like patient to increase prednisone

## 2019-02-21 NOTE — TELEPHONE ENCOUNTER
Spoke to patient about results.  Will have her increase her prednisone by 20 mg and taper as directed, so basically starting all over again.

## 2019-02-26 NOTE — TELEPHONE ENCOUNTER
RECORDS RECEIVED FROM: Internal - Dr. Josh DIOP [facility]   DATE RECEIVED: 2/28/19   NOTES (FOR ALL VISITS) STATUS DETAILS   OFFICE NOTE from referring provider Internal 2/12/19   OFFICE NOTE from other specialist Received [facility] Neurology:  1/19/19   DISCHARGE SUMMARY from hospital Received [facility]:  1/27/19-1/29/19   DISCHARGE REPORT from the ER Internal [facility]:  2/12/19 2/11/19    [facility]:  1/18/19   OPERATIVE REPORT N/A    MEDICATION LIST Internal    IMAGING  (FOR ALL VISITS)     EMG N/A    EEG N/A    ECT N/A    MRI (HEAD, NECK, SPINE) Received/Internal [facility]:  MRI Cervical Spine 1/27/19  MRI Orbits 1/27/19    [facility]:  MRI Brain 1/18/19   CT (HEAD, NECK, SPINE) N/A

## 2019-02-28 ENCOUNTER — PRE VISIT (OUTPATIENT)
Dept: NEUROLOGY | Facility: CLINIC | Age: 49
End: 2019-02-28

## 2019-02-28 ENCOUNTER — HOSPITAL ENCOUNTER (OUTPATIENT)
Dept: MRI IMAGING | Facility: CLINIC | Age: 49
Discharge: HOME | End: 2019-02-28
Attending: PSYCHIATRY & NEUROLOGY | Admitting: PSYCHIATRY & NEUROLOGY
Payer: COMMERCIAL

## 2019-02-28 ENCOUNTER — HOSPITAL ENCOUNTER (OUTPATIENT)
Dept: MRI IMAGING | Facility: CLINIC | Age: 49
End: 2019-02-28
Attending: PSYCHIATRY & NEUROLOGY
Payer: COMMERCIAL

## 2019-02-28 ENCOUNTER — OFFICE VISIT (OUTPATIENT)
Dept: NEUROLOGY | Facility: CLINIC | Age: 49
End: 2019-02-28
Attending: OPHTHALMOLOGY
Payer: COMMERCIAL

## 2019-02-28 VITALS
WEIGHT: 173.06 LBS | BODY MASS INDEX: 32.67 KG/M2 | HEART RATE: 70 BPM | SYSTOLIC BLOOD PRESSURE: 162 MMHG | DIASTOLIC BLOOD PRESSURE: 97 MMHG | HEIGHT: 61 IN

## 2019-02-28 DIAGNOSIS — G37.9 DEMYELINATING DISEASE OF CENTRAL NERVOUS SYSTEM (H): ICD-10-CM

## 2019-02-28 DIAGNOSIS — H46.9 OPTIC NEURITIS: ICD-10-CM

## 2019-02-28 DIAGNOSIS — R53.1 ACUTE LEFT-SIDED WEAKNESS: ICD-10-CM

## 2019-02-28 DIAGNOSIS — G37.9 DEMYELINATING DISEASE OF CENTRAL NERVOUS SYSTEM (H): Primary | ICD-10-CM

## 2019-02-28 PROCEDURE — 72156 MRI NECK SPINE W/O & W/DYE: CPT

## 2019-02-28 PROCEDURE — 70553 MRI BRAIN STEM W/O & W/DYE: CPT

## 2019-02-28 PROCEDURE — 25500064 ZZH RX 255 OP 636: Performed by: PSYCHIATRY & NEUROLOGY

## 2019-02-28 PROCEDURE — A9585 GADOBUTROL INJECTION: HCPCS | Performed by: PSYCHIATRY & NEUROLOGY

## 2019-02-28 PROCEDURE — 72157 MRI CHEST SPINE W/O & W/DYE: CPT

## 2019-02-28 PROCEDURE — G0463 HOSPITAL OUTPT CLINIC VISIT: HCPCS | Mod: 25

## 2019-02-28 RX ORDER — GADOBUTROL 604.72 MG/ML
7.5 INJECTION INTRAVENOUS ONCE
Status: COMPLETED | OUTPATIENT
Start: 2019-02-28 | End: 2019-02-28

## 2019-02-28 RX ORDER — DIAZEPAM 5 MG
5 TABLET ORAL
Qty: 2 TABLET | Refills: 0 | Status: SHIPPED | OUTPATIENT
Start: 2019-02-28 | End: 2019-04-25

## 2019-02-28 RX ADMIN — GADOBUTROL 7.5 ML: 604.72 INJECTION INTRAVENOUS at 18:50

## 2019-02-28 ASSESSMENT — PAIN SCALES - GENERAL: PAINLEVEL: MODERATE PAIN (4)

## 2019-02-28 ASSESSMENT — MIFFLIN-ST. JEOR: SCORE: 1357.38

## 2019-02-28 NOTE — LETTER
2/28/2019       RE: Kerrie Patel  [address]  [facility] [address]     Dear Colleague,    Thank you for referring your patient, Kerrie Patel, to the [facility] MULTIPLE SCLEROSIS at [facility]. Please see a copy of my visit note below.    THE [facility] MULTIPLE SCLEROSIS CLINIC  NEW PATIENT EVALUATION/CONSULTATION    Referral source:   [facility] [address] / [facility] [id number]    PRINCIPAL NEUROLOGIC DIAGNOSIS: Optic Neuritis    HISTORY OF ILLNESS:    An opinion on this 48 year old right handed genetic female  was requested by Dr. 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 The patient was accompanied by .     48 year old female with no significant PMH who was in her USOH until 1-17-19 when she developed a migraine which she has had before but does not have frequently, she tends to have them when there is a change in weather and 2-3 /year. That morning she felt a left sided headache, pulsating which is different than her typical migraine which is more like pressure or a heavy weight on her left side of the head. She felt it on the way to work while driving, she has had aura's before but did not have one this time. She noticed she was very hot and sweaty but not lightheaded. She works for the [place] [employer] as a , she had no stress or unusual situations at home at that time. She got to work and around 2 PM she noticed that her R eye was fuzzy but the headache was better, she also noticed eye pain on eye movement. She finished the day and went home and immediately went to bed, she was very tired and did not feel well. She woke up the next morning and her vision was completely gone on the R eye , no redness as per . She saw her optometrist Dr. Soria who diagnosed ON, she was sent to another clinic, [facility] where she was evaluated and sent to the ER, this was in [place], [place].  She was admitted to the hospital from the ER and an MRI of the brain was obtained, also steroids IV were started, she was given 1000 mg at noon at  then was transferred to [place] where she got a second dose at 9 PM on the  and 2 doses on  the , one at 3 PM and one at 9 PM, and one dose at 9 AM on Monday the . She was then D/Cd and reports that her vision was improving by that Tuesday. She then developed pancreatitis and was in the hospital for another 3 days and was discharged Friday but her eye was getting worse again so she had to go back to the ER on  because the eye was very painful and her vision was gone. She underwent plasmapheresis x 5 times and was doing well after that but before she got to see Dr. Kaur on  she was having eye pain again and ended up in the ER at [facility] on  and saw the neurology resident, she was sent home on tramadol which helped. That night the eye started swelling up and becoming red and protruding, she saw Dr. Kaur on the  and was placed on PO prednisone 40 mg x 7 days, 30 mg x 1 day then 40 mg x 7 day, she is currently on 30 mg x 2 days but the pain seems to be coming back but also seems associated to her vision improving. She also underwent a spina tap but results of CSF not available. She also reports mild L sided weakness for 2 days.    Current Symptoms:  1. L eye pain  2. Photophobia  3. L sided weakness    PAST HISTORY:  Past Medical History:   Diagnosis Date     Diffuse cystic mastopathy of breast     No Comments Provided     Family history of diabetes mellitus     No Comments Provided     Gastro-esophageal reflux disease without esophagitis     No Comments Provided     Pain in thoracic spine     No Comments Provided       Past Surgical History:   Procedure Laterality Date      SECTION      96, 99     CHOLECYSTECTOMY           OTHER SURGICAL HISTORY      2012,[id number],PREMALIG/BENIGN SKIN LESION EXCISION,BREAST LESION      OTHER SURGICAL HISTORY      6/28/2017,58277.0,[place] [facility] W TUBES/OVAR 250 G OR LESS              Current Outpatient Prescriptions:  Current Outpatient Medications   Medication     HYDROcodone-acetaminophen (NORCO) 5-325 MG tablet     predniSONE (DELTASONE) 20 MG tablet     traMADol (ULTRAM) 50 MG tablet     No current facility-administered medications for this visit.        Allergies   Allergen Reactions     Amoxicillin Nausea and Vomiting     Lactose      Other reaction(s): GI Bleeding       Social History     Socioeconomic History     Marital status:      Spouse name: Not on file     Number of children: Not on file     Years of education: Not on file     Highest education level: Not on file   Occupational History     Not on file   Social Needs     Financial resource strain: Not on file     Food insecurity:     Worry: Not on file     Inability: Not on file     Transportation needs:     Medical: Not on file     Non-medical: Not on file   Tobacco Use     Smoking status: Never Smoker     Smokeless tobacco: Never Used   Substance and Sexual Activity     Alcohol use: No     Alcohol/week: 0.0 oz     Comment: rare     Drug use: No     Comment: Drug use: No     Sexual activity: Not Currently     Partners: Male   Lifestyle     Physical activity:     Days per week: Not on file     Minutes per session: Not on file     Stress: Not on file   Relationships     Social connections:     Talks on phone: Not on file     Gets together: Not on file     Attends Restorationism service: Not on file     Active member of club or organization: Not on file     Attends meetings of clubs or organizations: Not on file     Relationship status: Not on file     Intimate partner violence:     Fear of current or ex partner: Not on file     Emotionally abused: Not on file     Physically abused: Not on file     Forced sexual activity: Not on file   Other Topics Concern     Parent/sibling w/ CABG, MI or angioplasty before 65F 55M? 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  Spouse, Ruperto, teaches people how to fight fires and has even done this internationally.  .  Has 2 sons, Tiro, 1/25/1996, Jordy, 2/23/1999.       Family History   Problem Relation Age of Onset     Diabetes Father         Diabetes,Type 2, Diverticulitis age 47      Breast Cancer No family hx of         Cancer-breast     Glaucoma No family hx of      Macular Degeneration No family hx of      REVIEW OF SYSTEMS:  Comprehensive review of systems otherwise was negative, including constitutional, head and neck, cardiovascular, pulmonary, gastrointestinal, endocrine, urologic, reproductive, rheumatic, hematologic, immunologic, dermatologic, and psychiatric.    Nutritional concerns: None  Driving issues: None   Safety concerns regarding living situations and safety at home: None  Risk of falls: None  Pain: None    PHYSICAL EXAM:    Hair, skin, nails, and joints were normal. Neck was supple without Lhermitte's phenomenon.  There was no percussion tenderness over the spine.     The patient was alert and oriented to person, place, and time with normal language, attention and concentration, recent and remote memory, praxis, and intellectual function. Affect was normal. The patient did not appear depressed.    Visual acuity:  OD 20/800  OS 20/20    Correction: without    Visual fields were full to confrontation.   Pupils were 4 mm and briskly reactive OU without a relative afferent pupillary defect.  Funduscopic examination was normal without disc edema, erythema, or atrophy.  Extraocular movements: Intact without BINU  Facial sensation is normal. Normal strength of the muscles of mastication: mildly decreased on the right.  Muscles of facial expression were normal  Hearing was normal. Gag reflex and palatal movements were normal. Sternocleidomastoid and trapezius power were normal. Tongue movements were normal. There was no  dysarthria.    Motor Examination:   There was no pronator drift.     Motor    Upper      Right Left   Shoulder Abduction 5 5   Elbow Flexion 5 5   Elbow Extension 5 5   Wrist Extension 5 5   Digit Extension 5 5   Digit Flexion 5 5   APB 5 5   Tone 0 0   Lower       Right Left   Hip Flexion 5 4   Knee Extension 5 4+   Knee Flexion 5 4+   Foot Dorsiflexion 5 4+   Foot Plantar Flexion 5 4+   EH 5 5   Toe Flexion 5 5   Tone 0 0               Reflexes:     Reflexes       Right  Left   Biceps 1  1   Triceps 1  1   Brachioradialis 1  1   Patellar  brisk  brisk   Achilles 1  1   Babinski down  down         Coordination:     Right Left   RRM Normal Normal   ROSA Normal Normal   FTN Normal Normal   RRM Normal Normal   HKS Normal Normal         Sensory examination:    Light touch:  Intact in all extremities      Coordination and Gait        Gait Normal   Right Left   Romberg Normal  Heel Normal Normal   Tandem {Normal  Toe Normal Normal               REVIEW OF OUTSIDE RECORDS:    All test were negative except anti MOG which is borderline (Negative < 1:20, hers 1:20)    REVIEW OF IMAGING STUDIES:    I personally reviewed the following images:    MRI of the brain shows a few WM hyperintensities not enhancing and R optic nerve enhancement, no MRI's of the spine are available    ASSESSMENT:    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 Current peripheral vision is improving but seems to decline when her prednisone is tapered, currently on 30 mg po every day. New L sided weakness (hand and LLE is new and concerning for new inflammation in brain or spina cord)    PLAN:  She will undergo an MRI of the Brain, C and T spine w/wo contrast this afternoon to evaluate for dissemination in time since last MRI in 1-27-19, if positive she will undergo a 5 day course of IV solumedrol and will be started on DMT, Rituxamab if possible. We also discussed the option of referring to  the [facility] since her case is quite aggressive and somewhat atypical.    Her  is in the process of starting a new job in [employer] and has decided to postpone the move which will be best for now, he will also consider moving close to where her family is instead of [place].    I will call them tomorrow and report the results of the MRI. I will contact Dr. Santana at [place] to discuss possible referral for second opinion for management.    They will be provided with a letter of support for him to delay the move andher to not return to work for the time being.    Finally I will follow the patient up in 1 month(s) as long as the patient is doing well. I instructed the patient to call or mychart my office with any concerns or questions.    I spent 105 minutes in this visit, with >50% direct patient time spent counseling about prognosis, treatment options, and coordination of care.     My recommendations will be communicated back to the patient's physician(s) by mail.  Follow-up is expected to be with me.      Lea Barragan MD  Chief, Multiple Sclerosis Division  Department of Neurology  [facility]      (Chart documentation was completed in part with Dragon voice-recognition software. Even though reviewed, some grammatical, spelling, and word errors may remain.)       Again, thank you for allowing me to participate in the care of your patient.      Sincerely,    Lea Barragan MD

## 2019-02-28 NOTE — PROGRESS NOTES
THE [facility] MULTIPLE SCLEROSIS CLINIC  NEW PATIENT EVALUATION/CONSULTATION    Referral source:   [facility]in[facility] [address] / [facility] [id number]            PRINCIPAL NEUROLOGIC DIAGNOSIS: Optic Neuritis          HISTORY OF ILLNESS:    An opinion on this 48 year old right handed genetic female  was requested by Dr. 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 The patient was accompanied by .     48 year old female with no significant PMH who was in her USOH until 1-17-19 when she developed a migraine which she has had before but does not have frequently, she tends to have them when there is a change in weather and 2-3 /year. That morning she felt a left sided headache, pulsating which is different than her typical migraine which is more like pressure or a heavy weight on her left side of the head. She felt it on the way to work while driving, she has had aura's before but did not have one this time. She noticed she was very hot and sweaty but not lightheaded. She works for the [place] [employer] as a , she had no stress or unusual situations at home at that time. She got to work and around 2 PM she noticed that her R eye was fuzzy but the headache was better, she also noticed eye pain on eye movement. She finished the day and went home and immediately went to bed, she was very tired and did not feel well. She woke up the next morning and her vision was completely gone on the R eye , no redness as per . She saw her optometrist Dr. Soria who diagnosed ON, she was sent to another clinic, [facility] where she was evaluated and sent to the ER, this was in [place], [place]. She was admitted to the hospital from the ER and an MRI of the brain was obtained, also steroids IV were started, she was given 1000 mg at noon at  then was transferred to [place] where she got a second dose at 9 PM on the 19th and 2 doses on Sunday the 20th, one at  3 PM and one at 9 PM, and one dose at 9 AM on Monday the . She was then D/Cd and reports that her vision was improving by that Tuesday. She then developed pancreatitis and was in the hospital for another 3 days and was discharged Friday but her eye was getting worse again so she had to go back to the ER on  because the eye was very painful and her vision was gone. She underwent plasmapheresis x 5 times and was doing well after that but before she got to see Dr. Kaur on  she was having eye pain again and ended up in the ER at [facility] on  and saw the neurology resident, she was sent home on tramadol which helped. That night the eye started swelling up and becoming red and protruding, she saw Dr. Kaur on the  and was placed on PO prednisone 40 mg x 7 days, 30 mg x 1 day then 40 mg x 7 day, she is currently on 30 mg x 2 days but the pain seems to be coming back but also seems associated to her vision improving. She also underwent a spina tap but results of CSF not available. She also reports mild L sided weakness for 2 days.    Current Symptoms:  1. L eye pain  2. Photophobia  3. L sided weakness        PAST HISTORY:  Past Medical History:   Diagnosis Date     Diffuse cystic mastopathy of breast     No Comments Provided     Family history of diabetes mellitus     No Comments Provided     Gastro-esophageal reflux disease without esophagitis     No Comments Provided     Pain in thoracic spine     No Comments Provided       Past Surgical History:   Procedure Laterality Date      SECTION      96, 99     CHOLECYSTECTOMY           OTHER SURGICAL HISTORY      2012,WES088,PREMALIG/BENIGN SKIN LESION EXCISION,BREAST LESION     OTHER SURGICAL HISTORY      2017,02007.0,[place] [facility] W TUBES/OVAR 250 G OR LESS              Current Outpatient Prescriptions:  Current Outpatient Medications   Medication     HYDROcodone-acetaminophen (NORCO) 5-325 MG tablet     predniSONE (DELTASONE) 20 MG  tablet     traMADol (ULTRAM) 50 MG tablet     No current facility-administered medications for this visit.           ALLERGIES       Allergies   Allergen Reactions     Amoxicillin Nausea and Vomiting     Lactose      Other reaction(s): GI Bleeding         Social History    Social History     Socioeconomic History     Marital status:      Spouse name: Not on file     Number of children: Not on file     Years of education: Not on file     Highest education level: Not on file   Occupational History     Not on file   Social Needs     Financial resource strain: Not on file     Food insecurity:     Worry: Not on file     Inability: Not on file     Transportation needs:     Medical: Not on file     Non-medical: Not on file   Tobacco Use     Smoking status: Never Smoker     Smokeless tobacco: Never Used   Substance and Sexual Activity     Alcohol use: No     Alcohol/week: 0.0 oz     Comment: rare     Drug use: No     Comment: Drug use: No     Sexual activity: Not Currently     Partners: Male   Lifestyle     Physical activity:     Days per week: Not on file     Minutes per session: Not on file     Stress: Not on file   Relationships     Social connections:     Talks on phone: Not on file     Gets together: Not on file     Attends Yazidism service: Not on file     Active member of club or organization: Not on file     Attends meetings of clubs or organizations: Not on file     Relationship status: Not on file     Intimate partner violence:     Fear of current or ex partner: Not on file     Emotionally abused: Not on file     Physically abused: Not on file     Forced sexual activity: Not on file   Other Topics Concern     Parent/sibling w/ CABG, MI or angioplasty before 65F 55M? 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  Spouse, Ruperto, teaches people how to fight fires and has even done this internationally.  .  Has 2 sons, Radford, 1/25/1996, Jordy  2/23/1999.         FAMILY HISTORY     Family History   Problem Relation Age of Onset     Diabetes Father         Diabetes,Type 2, Diverticulitis age 47      Breast Cancer No family hx of         Cancer-breast     Glaucoma No family hx of      Macular Degeneration No family hx of          REVIEW OF SYSTEMS:    Comprehensive review of systems otherwise was negative, including constitutional, head and neck, cardiovascular, pulmonary, gastrointestinal, endocrine, urologic, reproductive, rheumatic, hematologic, immunologic, dermatologic, and psychiatric.    Nutritional concerns: None  Driving issues: None   Safety concerns regarding living situations and safety at home: None  Risk of falls: None  Pain: None    PHYSICAL EXAM:    Hair, skin, nails, and joints were normal. Neck was supple without Lhermitte's phenomenon.  There was no percussion tenderness over the spine.     The patient was alert and oriented to person, place, and time with normal language, attention and concentration, recent and remote memory, praxis, and intellectual function. Affect was normal. The patient did not appear depressed.    Visual acuity:  OD 20/800  OS 20/20    Correction: without    Visual fields were full to confrontation.   Pupils were 4 mm and briskly reactive OU without a relative afferent pupillary defect.  Funduscopic examination was normal without disc edema, erythema, or atrophy.  Extraocular movements: Intact without BINU  Facial sensation is normal. Normal strength of the muscles of mastication: mildly decreased on the right.  Muscles of facial expression were normal  Hearing was normal. Gag reflex and palatal movements were normal. Sternocleidomastoid and trapezius power were normal. Tongue movements were normal. There was no dysarthria.    Motor Examination:   There was no pronator drift.       Motor    Upper      Right Left   Shoulder Abduction 5 5   Elbow Flexion 5 5   Elbow Extension 5 5   Wrist Extension 5 5   Digit Extension  5 5   Digit Flexion 5 5   APB 5 5   Tone 0 0   Lower       Right Left   Hip Flexion 5 4   Knee Extension 5 4+   Knee Flexion 5 4+   Foot Dorsiflexion 5 4+   Foot Plantar Flexion 5 4+   EH 5 5   Toe Flexion 5 5   Tone 0 0               Reflexes:     Reflexes       Right  Left   Biceps 1  1   Triceps 1  1   Brachioradialis 1  1   Patellar  brisk  brisk   Achilles 1  1   Babinski down  down         Coordination:     Right Left   RRM Normal Normal   ROSA Normal Normal   FTN Normal Normal   RRM Normal Normal   HKS Normal Normal         Sensory examination:    Light touch:  Intact in all extremities      Coordination and Gait        Gait Normal   Right Left   Romberg Normal  Heel Normal Normal   Tandem {Normal  Toe Normal Normal               REVIEW OF OUTSIDE RECORDS:    All test were negative except anti MOG which is borderline (Negative < 1:20, hers 1:20)    REVIEW OF IMAGING STUDIES:    I personally reviewed the following images:    MRI of the brain shows a few WM hyperintensities not enhancing and R optic nerve enhancement, no MRI's of the spine are available    ASSESSMENT:    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 Current peripheral vision is improving but seems to decline when her prednisone is tapered, currently on 30 mg po every day. New L sided weakness (hand and LLE is new and concerning for new inflammation in brain or spina cord)    PLAN:  She will undergo an MRI of the Brain, C and T spine w/wo contrast this afternoon to evaluate for dissemination in time since last MRI in 1-27-19, if positive she will undergo a 5 day course of IV solumedrol and will be started on DMT, Rituxamab if possible. We also discussed the option of referring to the [facility] since her case is quite aggressive and somewhat atypical.    Her  is in the process of starting a new job in [employer] and has decided to postpone the move which will be best for now,  he will also consider moving close to where her family is instead of [place].    I will call them tomorrow and report the results of the MRI. I will contact Dr. Santana at [place] to discuss possible referral for second opinion for management.    They will be provided with a letter of support for him to delay the move andher to not return to work for the time being.    Finally I will follow the patient up in 1 month(s) as long as the patient is doing well. I instructed the patient to call or mychart my office with any concerns or questions.    I spent 105 minutes in this visit, with >50% direct patient time spent counseling about prognosis, treatment options, and coordination of care.     My recommendations will be communicated back to the patient's physician(s) by mail.  Follow-up is expected to be with me.     Addendum: 3T MRI of the brain obtained last night shows multiple WM lesions, none of them enhancing.  MRI of cervical spine shown a T2 hyperintensity on sagittal STIR, more diffuse hyperintensities on axial T2 and an anterior area of enhancement.  No abnormalities seen on thoracic MRI    Plan is to admit for a 3-5 day (depending on response of L sided weakness) course of IV solumedrol, 500 bid followed by Rituxamab. Patient and  notified and will drive to [facility] tomorrow morning. Hospital admissions and admitting MD (Janet Ferris) notified.       Lea Barragan MD  Chief, Multiple Sclerosis Division  Department of Neurology  [facility]      (Chart documentation was completed in part with Dragon voice-recognition software. Even though reviewed, some grammatical, spelling, and word errors may remain.)

## 2019-03-02 ENCOUNTER — HOSPITAL ENCOUNTER (INPATIENT)
Facility: CLINIC | Age: 49
LOS: 4 days | Discharge: HOME | End: 2019-03-06
Attending: PSYCHIATRY & NEUROLOGY | Admitting: PSYCHIATRY & NEUROLOGY
Payer: COMMERCIAL

## 2019-03-02 DIAGNOSIS — G37.9 DEMYELINATING DISEASE OF THE SPINAL CORD (H): Primary | ICD-10-CM

## 2019-03-02 DIAGNOSIS — H53.10 SUBJECTIVE VISUAL DISTURBANCE: ICD-10-CM

## 2019-03-02 DIAGNOSIS — H05.10 IDIOPATHIC ORBITAL INFLAMMATORY SYNDROME: ICD-10-CM

## 2019-03-02 LAB
AMYLASE SERPL-CCNC: 41 U/L (ref 30–110)
ANION GAP SERPL CALCULATED.3IONS-SCNC: 9 MMOL/L (ref 3–14)
BUN SERPL-MCNC: 21 MG/DL (ref 7–30)
CALCIUM SERPL-MCNC: 8.9 MG/DL (ref 8.5–10.1)
CHLORIDE SERPL-SCNC: 105 MMOL/L (ref 94–109)
CO2 SERPL-SCNC: 26 MMOL/L (ref 20–32)
CREAT SERPL-MCNC: 0.7 MG/DL (ref 0.52–1.04)
ERYTHROCYTE [DISTWIDTH] IN BLOOD BY AUTOMATED COUNT: 14.8 % (ref 10–15)
GFR SERPL CREATININE-BSD FRML MDRD: >90 ML/MIN/{1.73_M2}
GLUCOSE SERPL-MCNC: 148 MG/DL (ref 70–99)
HCT VFR BLD AUTO: 43.5 % (ref 35–47)
HGB BLD-MCNC: 14 G/DL (ref 11.7–15.7)
MCH RBC QN AUTO: 31 PG (ref 26.5–33)
MCHC RBC AUTO-ENTMCNC: 32.2 G/DL (ref 31.5–36.5)
MCV RBC AUTO: 96 FL (ref 78–100)
PLATELET # BLD AUTO: 142 10E9/L (ref 150–450)
POTASSIUM SERPL-SCNC: 4.3 MMOL/L (ref 3.4–5.3)
RBC # BLD AUTO: 4.52 10E12/L (ref 3.8–5.2)
SODIUM SERPL-SCNC: 140 MMOL/L (ref 133–144)
WBC # BLD AUTO: 11.9 10E9/L (ref 4–11)

## 2019-03-02 PROCEDURE — 85027 COMPLETE CBC AUTOMATED: CPT | Performed by: STUDENT IN AN ORGANIZED HEALTH CARE EDUCATION/TRAINING PROGRAM

## 2019-03-02 PROCEDURE — 12000001 ZZH R&B MED SURG/OB UMMC

## 2019-03-02 PROCEDURE — 87340 HEPATITIS B SURFACE AG IA: CPT | Performed by: STUDENT IN AN ORGANIZED HEALTH CARE EDUCATION/TRAINING PROGRAM

## 2019-03-02 PROCEDURE — 86480 TB TEST CELL IMMUN MEASURE: CPT | Performed by: STUDENT IN AN ORGANIZED HEALTH CARE EDUCATION/TRAINING PROGRAM

## 2019-03-02 PROCEDURE — 25000128 H RX IP 250 OP 636: Performed by: STUDENT IN AN ORGANIZED HEALTH CARE EDUCATION/TRAINING PROGRAM

## 2019-03-02 PROCEDURE — 40000141 ZZH STATISTIC PERIPHERAL IV START W/O US GUIDANCE

## 2019-03-02 PROCEDURE — 25800030 ZZH RX IP 258 OP 636: Performed by: STUDENT IN AN ORGANIZED HEALTH CARE EDUCATION/TRAINING PROGRAM

## 2019-03-02 PROCEDURE — 86704 HEP B CORE ANTIBODY TOTAL: CPT | Performed by: STUDENT IN AN ORGANIZED HEALTH CARE EDUCATION/TRAINING PROGRAM

## 2019-03-02 PROCEDURE — 36415 COLL VENOUS BLD VENIPUNCTURE: CPT | Performed by: STUDENT IN AN ORGANIZED HEALTH CARE EDUCATION/TRAINING PROGRAM

## 2019-03-02 PROCEDURE — 80048 BASIC METABOLIC PNL TOTAL CA: CPT | Performed by: STUDENT IN AN ORGANIZED HEALTH CARE EDUCATION/TRAINING PROGRAM

## 2019-03-02 PROCEDURE — 25000132 ZZH RX MED GY IP 250 OP 250 PS 637: Performed by: STUDENT IN AN ORGANIZED HEALTH CARE EDUCATION/TRAINING PROGRAM

## 2019-03-02 PROCEDURE — 82150 ASSAY OF AMYLASE: CPT | Performed by: STUDENT IN AN ORGANIZED HEALTH CARE EDUCATION/TRAINING PROGRAM

## 2019-03-02 RX ORDER — LIDOCAINE 40 MG/G
CREAM TOPICAL
Status: DISCONTINUED | OUTPATIENT
Start: 2019-03-02 | End: 2019-03-06 | Stop reason: HOSPADM

## 2019-03-02 RX ORDER — POTASSIUM CHLORIDE 1.5 G/1.58G
20-40 POWDER, FOR SOLUTION ORAL
Status: DISCONTINUED | OUTPATIENT
Start: 2019-03-02 | End: 2019-03-06 | Stop reason: HOSPADM

## 2019-03-02 RX ORDER — ACETAMINOPHEN 325 MG/1
650 TABLET ORAL EVERY 4 HOURS PRN
Status: DISCONTINUED | OUTPATIENT
Start: 2019-03-02 | End: 2019-03-02

## 2019-03-02 RX ORDER — TRAMADOL HYDROCHLORIDE 50 MG/1
50 TABLET ORAL EVERY 4 HOURS PRN
Status: DISCONTINUED | OUTPATIENT
Start: 2019-03-02 | End: 2019-03-06 | Stop reason: HOSPADM

## 2019-03-02 RX ORDER — ACETAMINOPHEN 500 MG
1000 TABLET ORAL EVERY 6 HOURS PRN
Status: DISCONTINUED | OUTPATIENT
Start: 2019-03-02 | End: 2019-03-06 | Stop reason: HOSPADM

## 2019-03-02 RX ORDER — POTASSIUM CL/LIDO/0.9 % NACL 10MEQ/0.1L
10 INTRAVENOUS SOLUTION, PIGGYBACK (ML) INTRAVENOUS
Status: DISCONTINUED | OUTPATIENT
Start: 2019-03-02 | End: 2019-03-06 | Stop reason: HOSPADM

## 2019-03-02 RX ORDER — POTASSIUM CHLORIDE 29.8 MG/ML
20 INJECTION INTRAVENOUS
Status: DISCONTINUED | OUTPATIENT
Start: 2019-03-02 | End: 2019-03-06 | Stop reason: HOSPADM

## 2019-03-02 RX ORDER — POTASSIUM CHLORIDE 750 MG/1
20-40 TABLET, EXTENDED RELEASE ORAL
Status: DISCONTINUED | OUTPATIENT
Start: 2019-03-02 | End: 2019-03-06 | Stop reason: HOSPADM

## 2019-03-02 RX ORDER — POLYETHYLENE GLYCOL 3350 17 G/17G
17 POWDER, FOR SOLUTION ORAL DAILY
Status: DISCONTINUED | OUTPATIENT
Start: 2019-03-02 | End: 2019-03-06 | Stop reason: HOSPADM

## 2019-03-02 RX ORDER — AMOXICILLIN 250 MG
2 CAPSULE ORAL DAILY
Status: DISCONTINUED | OUTPATIENT
Start: 2019-03-02 | End: 2019-03-06 | Stop reason: HOSPADM

## 2019-03-02 RX ORDER — POTASSIUM CHLORIDE 7.45 MG/ML
10 INJECTION INTRAVENOUS
Status: DISCONTINUED | OUTPATIENT
Start: 2019-03-02 | End: 2019-03-06 | Stop reason: HOSPADM

## 2019-03-02 RX ORDER — MAGNESIUM SULFATE HEPTAHYDRATE 40 MG/ML
4 INJECTION, SOLUTION INTRAVENOUS EVERY 4 HOURS PRN
Status: DISCONTINUED | OUTPATIENT
Start: 2019-03-02 | End: 2019-03-06 | Stop reason: HOSPADM

## 2019-03-02 RX ORDER — ONDANSETRON 4 MG/1
4 TABLET, ORALLY DISINTEGRATING ORAL EVERY 6 HOURS PRN
Status: DISCONTINUED | OUTPATIENT
Start: 2019-03-02 | End: 2019-03-06 | Stop reason: HOSPADM

## 2019-03-02 RX ORDER — NALOXONE HYDROCHLORIDE 0.4 MG/ML
.1-.4 INJECTION, SOLUTION INTRAMUSCULAR; INTRAVENOUS; SUBCUTANEOUS
Status: DISCONTINUED | OUTPATIENT
Start: 2019-03-02 | End: 2019-03-06 | Stop reason: HOSPADM

## 2019-03-02 RX ORDER — ONDANSETRON 2 MG/ML
4 INJECTION INTRAMUSCULAR; INTRAVENOUS EVERY 6 HOURS PRN
Status: DISCONTINUED | OUTPATIENT
Start: 2019-03-02 | End: 2019-03-06 | Stop reason: HOSPADM

## 2019-03-02 RX ADMIN — POLYETHYLENE GLYCOL 3350 17 G: 17 POWDER, FOR SOLUTION ORAL at 19:34

## 2019-03-02 RX ADMIN — SODIUM CHLORIDE 500 MG: 9 INJECTION, SOLUTION INTRAVENOUS at 19:26

## 2019-03-02 RX ADMIN — ENOXAPARIN SODIUM 40 MG: 40 INJECTION SUBCUTANEOUS at 17:51

## 2019-03-02 RX ADMIN — ACETAMINOPHEN 650 MG: 325 TABLET, FILM COATED ORAL at 14:23

## 2019-03-02 ASSESSMENT — ACTIVITIES OF DAILY LIVING (ADL)
TOILETING: 0-->INDEPENDENT
DRESS: 0-->INDEPENDENT
RETIRED_COMMUNICATION: 0-->UNDERSTANDS/COMMUNICATES WITHOUT DIFFICULTY
ADLS_ACUITY_SCORE: 11
AMBULATION: 0-->INDEPENDENT
TRANSFERRING: 0-->INDEPENDENT
COGNITION: 0 - NO COGNITION ISSUES REPORTED
ADLS_ACUITY_SCORE: 11
BATHING: 0-->INDEPENDENT
SWALLOWING: 0-->SWALLOWS FOODS/LIQUIDS WITHOUT DIFFICULTY
FALL_HISTORY_WITHIN_LAST_SIX_MONTHS: NO
RETIRED_EATING: 0-->INDEPENDENT

## 2019-03-02 NOTE — PLAN OF CARE
Vitals:    03/02/19 1240 03/02/19 1421 03/02/19 1547   BP: (!) 166/94 159/86 155/85   BP Location:   Right arm   Pulse: 75  71   Resp: 16  16   Temp: 98.3  F (36.8  C)  97.7  F (36.5  C)   TempSrc: Oral  Oral   SpO2: 94%  95%   Weight: 79 kg (174 lb 1.6 oz)       Pt admitted directly to 7B at 1240. A&O x4. Afebrile, 95% RA, other VSS.  Tylenol given once. PRN Tramadol available, pt aware. Denies nausea.  L pupile 2mm brisk and equal. R pupil 4mm Hippus (team aware).    Up independently. Voiding not saving. +flatus, no BM. Transferred to 6A.

## 2019-03-02 NOTE — H&P
H&P dictated. See dictation for full details.     Pt admitted with recently diagnosed anti-MOG syndrome. In January had right eye optic neuritis. Now with new left sided weakness and a concern for cervical cord T2 hyperintensity and enhancement.    She will receive Solu-medrol 500mg BID IV for 2-5 days depending on clinical response followed by Rituxan infusion.  Monitor for pancreatitis.    Janet Ferris MD [facility]  Department of Neurology  Pager [phone]

## 2019-03-02 NOTE — H&P
Admitted:     03/02/2019      REASON FOR ADMISSION: ANTIMOG syndrome.      ADMISSION DIAGNOSES:   1.  ANTIMOG syndrome.   2.  Left-sided weakness with MRI correlate.   3.  Recent right-sided optic neuritis.      HISTORY OF PRESENT ILLNESS:  Kerrie Patel is a 48-year-old woman who presents for direct admission to the General Neurology Hospitalist Service on the advice of Dr. Lea Barragan.  Please see Dr. Barragan's note from 2/18/2019, for full details of her recent history.  To briefly summarize, the patient developed symptoms of right eye pain and vision changes that rapidly progressed over 48 hours on 1/17/2019.  She was diagnosed with optic neuritis.  The patient was subsequently treated with high-dose I.V. steroids and a course that was complicated by the development of pancreatitis.  She has regained some of the vision on the lateral aspects of the right-sided vision but remains with a central scotoma.  The patient received not only Solu-Medrol treatments but plasma exchange treatments x5.  More recently she has developed some left-sided weakness that is noticeable in her arm and her leg.  She reports starting to notice these symptoms earlier this week.  She denies having falls but does notice that she has to concentrate more on her walking.      The patient has been thoroughly evaluated by Dr. Barragan and Dr. Kaur in Neuro-Ophthalmology.  She has MRI imaging of the brain, cervical and thoracic spine that show T2 hyperintense lesions.  In the brain, there 5 foci of T2 hyperintense lesions, none of which are enhancing with gadolinium dye.  There is also decreased enhancement and perineural edema in the left optic nerve, compatible with improving optic neuritis on the most recent MRI scan of the brain.  MRI of the cervical spine on 02/28 revealed multilevel cervical spondylosis with only mild spinal canal and left neural foraminal stenosis at C6-7.  The study was read as showing no abnormal enhancement or cord  abnormality, but on my review of the imaging as well as Yung's review of the imaging, the patient has a T2 hyperintensity on sagittal STIR with an anterior area of enhancement also present.  Due to this finding, the patient has been directed for admission and treatment with high-dose Solu-Medrol followed by rituximab infusions.      PAST MEDICAL HISTORY:  Gastroesophageal reflux disease and pancreatitis as a result of recent steroid infusions.      PAST SURGICAL HISTORY:  Significant for  and cholecystectomy.      SOCIAL HISTORY:  The patient is .  She is currently on FMLA leave.  She lives with her  up in [place].  He travels for his work and spends a lot of time out of state.  [place].      FAMILY HISTORY:  Significant for father with type 2 diabetes.  There is no family history of autoimmune disease including multiple sclerosis.      ALLERGIES:  Amoxicillin causes nausea and vomiting, lactose resulted and GI bleeding.      MEDICATIONS:  outpatient prescriptions include tramadol 50 mg tablets used as needed for pain.      REVIEW OF SYSTEMS:  Please see HPI.  All other systems reviewed and negative except for the following:   The patient reports slightly increased levels of fatigue.  She also reports some numbness in the face, arm and leg.  She reports some chronic constipation problems that were made substantially worse last time she received steroid infusions.      PHYSICAL EXAMINATION:   VITAL SIGNS:  Temperature 98.3, pulse 75 and regular.  Respiratory rate 16, blood pressure 159/86 and pulse oximetry is 94% on room air.   GENERAL:  The patient is well groomed and cooperative with examination.  She is in no acute distress.   NEUROLOGIC:  The patient is awake.  She is alert.  She is oriented to person, place and time.  She has no aphasia or dysarthria.  Attention and recall are intact.  Cranial nerves II-XII reveal  reduced visual acuity in the right eye compared to the right with a central scotoma and visual field confrontation.  The patient has a right-sided afferent pupillary defect but does react to light.  Funduscopic examination did not reveal any substantial edema.  Extraocular movements are intact with no evidence of BINU.  Remainder of cranial nerves are within normal limits except for some subjective decreased sensation to light touch on the left side of the face compared to the right.  On general motor survey, the patient appears to have normal muscle bulk and tone in all 4 extremities.  She has pronator drift in the left upper extremity.  With strength testing in the left upper extremity, she has 4/5 weakness in finger extension, wrist extension, arm extension and interossei strength.  In the lower extremity, the patient has 4/5 weakness of hip flexion with 4+/5 weakness with knee extension, foot dorsiflexion and EHL strength testing.  The remainder of the strength testing was normal.  Reflexes in the upper extremities were within normal limits and bilaterally symmetric.  Patellar reflexes were brisk bilaterally but slightly more so on the left than the right.  Again, left Achilles reflex was slightly brisker than the right.  Plantar responses were flexor bilaterally.  Sensation to light touch, had the patient reporting subjective decrement of 50% in the left face and arm and 75% in the left leg.  Gait examination is slightly slow but stable.  She does take 6 steps to turn 180 degrees and is tentative but able to have correct balance.  Finger taps are normal bilaterally, heel-knee-shin was normal bilaterally.  Finger-to-nose was slightly dissymmetric in the left upper extremity.   CARDIOVASCULAR:  Regular rate and rhythm with no significant murmurs.   LUNGS:  Clear to auscultation bilaterally.   ABDOMEN:  Soft, nontender, nondistended with active bowel sounds.      IMPRESSION:   1.  Recently diagnosed ANTIMOG  syndrome.   2.  Active demyelinating lesion in the cervical spinal cord.   3.  Recent right eye optic neuritis.      PLAN:  The patient is admitted to the General Neurology Service.  She will receive Solu-Medrol 500 mg I.V. b.i.d.  Initially in discussion with Dr. Barragan.  She was thinking 3-5 days depending on clinical response, but there is concern that we might trigger Ms. Starr's pancreatitis again.  We will treat on a day-to-day basis and follow for improvement. The patient will have pancreatic enzymes checked daily to make sure they are not elevating.  At the end of the steroid infusions, the patient will initiate Rituxan and then follow up with Dr. Barragan.      1. Preworkup before Rituxan infusion will be ordered.   2. The patient does not appear to be in need of physical therapy at this time.    3. Enoxaparin to be used for DVT prophylaxis.    4. The patient will receive MiraLax to help treat constipation.    5. The patient can continue to receive tramadol p.r.n. for pain management.         CHARLENE ESTRADA MD             D: 2019   T: 2019   MT: AS      Name:     RAJENDRA STARR   MRN:      [id number]        Account:      [id number]   :      1970        Admitted:     2019                   Document: [id number]       cc: Lea Higgins NP

## 2019-03-03 LAB
AMYLASE SERPL-CCNC: 31 U/L (ref 30–110)
ANION GAP SERPL CALCULATED.3IONS-SCNC: 8 MMOL/L (ref 3–14)
BUN SERPL-MCNC: 21 MG/DL (ref 7–30)
CALCIUM SERPL-MCNC: 9 MG/DL (ref 8.5–10.1)
CHLORIDE SERPL-SCNC: 105 MMOL/L (ref 94–109)
CO2 SERPL-SCNC: 26 MMOL/L (ref 20–32)
CREAT SERPL-MCNC: 0.58 MG/DL (ref 0.52–1.04)
ERYTHROCYTE [DISTWIDTH] IN BLOOD BY AUTOMATED COUNT: 14.4 % (ref 10–15)
GFR SERPL CREATININE-BSD FRML MDRD: >90 ML/MIN/{1.73_M2}
GLUCOSE SERPL-MCNC: 152 MG/DL (ref 70–99)
HCT VFR BLD AUTO: 43.9 % (ref 35–47)
HGB BLD-MCNC: 14.1 G/DL (ref 11.7–15.7)
MCH RBC QN AUTO: 31 PG (ref 26.5–33)
MCHC RBC AUTO-ENTMCNC: 32.1 G/DL (ref 31.5–36.5)
MCV RBC AUTO: 97 FL (ref 78–100)
PLATELET # BLD AUTO: 134 10E9/L (ref 150–450)
POTASSIUM SERPL-SCNC: 4.3 MMOL/L (ref 3.4–5.3)
RBC # BLD AUTO: 4.55 10E12/L (ref 3.8–5.2)
SODIUM SERPL-SCNC: 140 MMOL/L (ref 133–144)
WBC # BLD AUTO: 10.3 10E9/L (ref 4–11)

## 2019-03-03 PROCEDURE — 85027 COMPLETE CBC AUTOMATED: CPT | Performed by: PSYCHIATRY & NEUROLOGY

## 2019-03-03 PROCEDURE — 80048 BASIC METABOLIC PNL TOTAL CA: CPT | Performed by: PSYCHIATRY & NEUROLOGY

## 2019-03-03 PROCEDURE — 25000128 H RX IP 250 OP 636: Performed by: STUDENT IN AN ORGANIZED HEALTH CARE EDUCATION/TRAINING PROGRAM

## 2019-03-03 PROCEDURE — 36415 COLL VENOUS BLD VENIPUNCTURE: CPT | Performed by: PSYCHIATRY & NEUROLOGY

## 2019-03-03 PROCEDURE — 25000132 ZZH RX MED GY IP 250 OP 250 PS 637: Performed by: STUDENT IN AN ORGANIZED HEALTH CARE EDUCATION/TRAINING PROGRAM

## 2019-03-03 PROCEDURE — 40000141 ZZH STATISTIC PERIPHERAL IV START W/O US GUIDANCE

## 2019-03-03 PROCEDURE — 12000001 ZZH R&B MED SURG/OB UMMC

## 2019-03-03 PROCEDURE — 25800030 ZZH RX IP 258 OP 636: Performed by: STUDENT IN AN ORGANIZED HEALTH CARE EDUCATION/TRAINING PROGRAM

## 2019-03-03 PROCEDURE — 82150 ASSAY OF AMYLASE: CPT | Performed by: PSYCHIATRY & NEUROLOGY

## 2019-03-03 RX ADMIN — Medication 1 MG: at 23:00

## 2019-03-03 RX ADMIN — ACETAMINOPHEN 1000 MG: 500 TABLET, FILM COATED ORAL at 18:02

## 2019-03-03 RX ADMIN — SODIUM CHLORIDE 500 MG: 9 INJECTION, SOLUTION INTRAVENOUS at 06:52

## 2019-03-03 RX ADMIN — TRAMADOL HYDROCHLORIDE 50 MG: 50 TABLET, COATED ORAL at 12:07

## 2019-03-03 RX ADMIN — ENOXAPARIN SODIUM 40 MG: 40 INJECTION SUBCUTANEOUS at 17:24

## 2019-03-03 RX ADMIN — TRAMADOL HYDROCHLORIDE 50 MG: 50 TABLET, COATED ORAL at 16:21

## 2019-03-03 RX ADMIN — SODIUM CHLORIDE 500 MG: 9 INJECTION, SOLUTION INTRAVENOUS at 18:56

## 2019-03-03 RX ADMIN — ACETAMINOPHEN 1000 MG: 500 TABLET, FILM COATED ORAL at 06:57

## 2019-03-03 RX ADMIN — TRAMADOL HYDROCHLORIDE 50 MG: 50 TABLET, COATED ORAL at 23:00

## 2019-03-03 ASSESSMENT — ACTIVITIES OF DAILY LIVING (ADL)
ADLS_ACUITY_SCORE: 11

## 2019-03-03 NOTE — PLAN OF CARE
DX w/anti-MOG syndrome who presenting with right eye pain and vision changes (Optic neuritis)   Here for steroid TX. AVSS- except HTN, c/o L)sided HA and neck pain. Decreases w/ tylenol and tramadol. Has central vision loss of R) eye and mild L) sided weakness. Afir po, VDSP, SL between IV steroids, new PIV placed.

## 2019-03-03 NOTE — PLAN OF CARE
Patient was admitted today to 7B with for a recent  diagnose of anti-MOG syndrome. Patient reported that she was told she has lesions on her back and head, she is admitted for iv steroid therapy.Transferred to unit 6A at 1800. Alert and  oriented x 4. Vital signs stable. On room air. Denies pain. Ambulates independently. I/a: Oriented to room and call light. Checked vital signs. Explained care. Resting in bed. Call light within reach. Plan: IV steroid, pending iv pump. Continue care.

## 2019-03-03 NOTE — PROGRESS NOTES
[facility]  Neurology Progress Note    Patient Name: Kerrie Patel  Patient MRN: [id number]  Admission Date: 3/2/2019  Today's Date: 03/03/2019  Chief Complaint: No chief complaint on file.    24hr events:  Admitted yesterday for 5 days of steroids.  She will be receiving 500 mg every 12 hours since the last round of 1g x5days gave her pancreatitis.  She had her first dose yesterday.    Subjective:  Doing well today. Tolerated the steroids.  present at bedside.    Review of Systems:  10 point review of systems was negative except as indicated in subjective section.    Objective:  /80 (BP Location: Left arm)   Pulse 53   Temp 97.8  F (36.6  C) (Oral)   Resp 16   Wt 79 kg (174 lb 1.6 oz)   SpO2 97%   BMI 32.90 kg/m    General: NAD  Cardio: hypertensive, NSR  Pulm: nonlabored on room air  Focused neurological exam: alert; language is fluent with intact comprehension; R APD but intact indirect, conjugate gaze, no gaze deviation, EOMI, no nystagmus, no facial asymmetry, no ptosis, facial sensation reduced on L hemiface, hearing intact to conversation, no hypophonia, no dysarthria; no abnormal posture or movements; 5/5 strength throughout except 4/5 in LUE distally at wrist and fingers and 4/5 in LLE hip flex and dorsiflexion; reflexes were not tested todayl L hemisensory decrement compared to R, worse in LLE, no extinction; gait and coordination were not tested today    Investigations:  No new for review.    Assessment and Plan:  Kerrie Patel is a 48 year old female w/ anti-MOG syndrome who presenting with right eye pain and vision changes that rapidly developed over 48 hours on 1/17/19.  She was diagnosed with optic neuritis, treated with steroids and 5 rounds of plasma exchange.  She had a recent MRI cervical spine which showed a putative enhancing lesion in the anterior area of the cervical spine, which prompted admission for treatment with high-dose steroids followed by  rituximab infusion    #anti-MOG syndrome:   -Solu-Medrol 500mg IV daily, will give today   - anticipate total of 3-5days but will treat on daily basis (first dose was on 3/2)   -initiate Rituximab once steroids are done   [ ] HepBSAg + HepBCAb + QuantGold negative before giving Rituximab   [ ] PT/OT recs for safe discharge   - neuro checks and vitals Q8H    Activity: Up ad rudy  Diet: Regular diet with thin liquids  IVF: Not indicated  DVT ppx: Lovenox  Bowel ppx: Senna/docusate 2 tabs daily  Lines: PIV  Code: Full code    Dispo: Pending 3-5 days of steroids and after receiving first dose of Rituximab.    The plan was formulated with the Neurology attending, Dr. Ferris.    Corby Alas MD  Neurology PGY4  [facility]

## 2019-03-03 NOTE — PLAN OF CARE
Status: Pt admitted d/t recent dx of anti-MOG syndrome.    VS: VSS on RA - hypertensive at times but within parameters.   Neuros: AOx4. All neuros intact besides mild L side weakness.  GI: Regular diet. No BM this shift.  : Voiding without issue. ?  IV: PIV SL in btwn steroid therapy.  Activity: IND.  Pain: Denies.  Respiratory/Trach: LS clear.   Skin: Intact.  Social:  here at bedside for part of shift.    Plan of care: Continue to monitor and follow POC.

## 2019-03-04 LAB
AMYLASE SERPL-CCNC: 33 U/L (ref 30–110)
ANION GAP SERPL CALCULATED.3IONS-SCNC: 8 MMOL/L (ref 3–14)
BUN SERPL-MCNC: 22 MG/DL (ref 7–30)
CALCIUM SERPL-MCNC: 8.9 MG/DL (ref 8.5–10.1)
CHLORIDE SERPL-SCNC: 105 MMOL/L (ref 94–109)
CO2 SERPL-SCNC: 26 MMOL/L (ref 20–32)
CREAT SERPL-MCNC: 0.61 MG/DL (ref 0.52–1.04)
ERYTHROCYTE [DISTWIDTH] IN BLOOD BY AUTOMATED COUNT: 14.6 % (ref 10–15)
GAMMA INTERFERON BACKGROUND BLD IA-ACNC: 0.04 IU/ML
GFR SERPL CREATININE-BSD FRML MDRD: >90 ML/MIN/{1.73_M2}
GLUCOSE SERPL-MCNC: 155 MG/DL (ref 70–99)
HBV CORE AB SERPL QL IA: NONREACTIVE
HBV SURFACE AG SERPL QL IA: NONREACTIVE
HCT VFR BLD AUTO: 44.4 % (ref 35–47)
HGB BLD-MCNC: 14.3 G/DL (ref 11.7–15.7)
M TB IFN-G BLD-IMP: NEGATIVE
M TB IFN-G CD4+ BCKGRND COR BLD-ACNC: 7.65 IU/ML
MCH RBC QN AUTO: 31.1 PG (ref 26.5–33)
MCHC RBC AUTO-ENTMCNC: 32.2 G/DL (ref 31.5–36.5)
MCV RBC AUTO: 97 FL (ref 78–100)
MITOGEN IGNF BCKGRD COR BLD-ACNC: 0 IU/ML
MITOGEN IGNF BCKGRD COR BLD-ACNC: 0.01 IU/ML
PLATELET # BLD AUTO: 148 10E9/L (ref 150–450)
POTASSIUM SERPL-SCNC: 4.4 MMOL/L (ref 3.4–5.3)
RBC # BLD AUTO: 4.6 10E12/L (ref 3.8–5.2)
SODIUM SERPL-SCNC: 140 MMOL/L (ref 133–144)
WBC # BLD AUTO: 15.1 10E9/L (ref 4–11)

## 2019-03-04 PROCEDURE — 25000128 H RX IP 250 OP 636: Performed by: STUDENT IN AN ORGANIZED HEALTH CARE EDUCATION/TRAINING PROGRAM

## 2019-03-04 PROCEDURE — 85027 COMPLETE CBC AUTOMATED: CPT | Performed by: PSYCHIATRY & NEUROLOGY

## 2019-03-04 PROCEDURE — 25800030 ZZH RX IP 258 OP 636: Performed by: STUDENT IN AN ORGANIZED HEALTH CARE EDUCATION/TRAINING PROGRAM

## 2019-03-04 PROCEDURE — 82150 ASSAY OF AMYLASE: CPT | Performed by: PSYCHIATRY & NEUROLOGY

## 2019-03-04 PROCEDURE — 80048 BASIC METABOLIC PNL TOTAL CA: CPT | Performed by: PSYCHIATRY & NEUROLOGY

## 2019-03-04 PROCEDURE — 36415 COLL VENOUS BLD VENIPUNCTURE: CPT | Performed by: PSYCHIATRY & NEUROLOGY

## 2019-03-04 PROCEDURE — 12000001 ZZH R&B MED SURG/OB UMMC

## 2019-03-04 PROCEDURE — 25000132 ZZH RX MED GY IP 250 OP 250 PS 637: Performed by: STUDENT IN AN ORGANIZED HEALTH CARE EDUCATION/TRAINING PROGRAM

## 2019-03-04 RX ADMIN — SODIUM CHLORIDE 500 MG: 9 INJECTION, SOLUTION INTRAVENOUS at 07:46

## 2019-03-04 RX ADMIN — ACETAMINOPHEN 1000 MG: 500 TABLET, FILM COATED ORAL at 12:19

## 2019-03-04 RX ADMIN — TRAMADOL HYDROCHLORIDE 50 MG: 50 TABLET, COATED ORAL at 15:30

## 2019-03-04 RX ADMIN — ENOXAPARIN SODIUM 40 MG: 40 INJECTION SUBCUTANEOUS at 19:00

## 2019-03-04 RX ADMIN — SODIUM CHLORIDE 500 MG: 9 INJECTION, SOLUTION INTRAVENOUS at 19:00

## 2019-03-04 RX ADMIN — POLYETHYLENE GLYCOL 3350 17 G: 17 POWDER, FOR SOLUTION ORAL at 07:46

## 2019-03-04 ASSESSMENT — ACTIVITIES OF DAILY LIVING (ADL)
ADLS_ACUITY_SCORE: 9

## 2019-03-04 NOTE — PROGRESS NOTES
[facility]  Neurology Progress Note    Patient Name: Kerrie Patel  Patient MRN: [id number]  Admission Date: 3/2/2019  Today's Date: 03/04/2019    Subjective:  Doing well today, improvement from presentation.  Tolerated steroids.  No acute issues overnight    Review of Systems:  10 point review of systems was negative except as indicated in subjective section.    Objective:  /73 (BP Location: Left arm)   Pulse 60   Temp 97.8  F (36.6  C) (Oral)   Resp 15   Wt 79 kg (174 lb 1.6 oz)   SpO2 97%   BMI 32.90 kg/m    General: NAD  Cardio: hypertensive, NSR  Pulm: nonlabored on room air  ABD: Soft, nontender, nondistended  EXT: No peripheral edema, no focal lesions  Neuro:  -MS: alert, speech fluent and coherent  -CN: vff, eomi, facial sensation and movement intact b/l, hearing intact to conversation, palate raises symmetrically, shoulder shrug and scm intact, tongue midline  -Motor: tone and bulk normal   --LUE: 5/5 arm flex, 4+/5 ext, 4/5wrist flex/ext,  5/5  - RUE: 5/5 arm flex, ext, wrist flex/ext, finger flex/ext/abd/add  --LLE: 4+ /5 hip flexor, 4/5 knee flex/ext, 5/5 plantar/dorsiflexion  --RLE: 5/5 hip flexor, leg flex/ext, plantar/dorsiflexion  -Reflexes: normoactive and symmetric at achilles, patella, brachioradialis, and biceps. Toes downgoing b/l  -Sensory: intact to LT and PP in all ext  -Coordination: intact to FNF, H2S b/l  -Gait: deferred     Investigations:  No new for review.    Assessment and Plan:  Kerrie Patel is a 48 year old female w/ anti-MOG syndrome who presenting with right eye pain and vision changes that rapidly developed over 48 hours on 1/17/19.  She was diagnosed with optic neuritis, treated with steroids and 5 rounds of plasma exchange.  She had a recent MRI cervical spine which showed a putative enhancing lesion in the anterior area of the cervical spine, which prompted admission for treatment with high-dose steroids followed by rituximab  infusion    #anti-MOG syndrome:  -Solu-Medrol 500mg IV daily; anticipate total of 3-5days but will treat on daily basis (first dose was on 3/2)   -initiate Rituximab once steroids are done   [ x ] HepBSAg + HepBCAb + QuantGold negative before giving Rituximab   - neuro checks and vitals Q8H    #Hx of Pancreatitis following IV solu-medrol: Amylase levels here have been normal  - daily lipase  -Continue twice daily steroid dosing    Activity: Up ad rudy  Diet: Regular diet with thin liquids  IVF: Not indicated  DVT ppx: Lovenox  Bowel ppx: Senna/docusate 2 tabs daily  Lines: PIV  Code: Full code    Dispo: Pending 3-5 days of steroids and after receiving first dose of Rituximab.    The plan was formulated with the Neurology attending, Dr. Diaz.    Mario Rosario MD on 3/4/2019 at 2:19 PM    I saw and evaluated the patient on 3/4/2019 and agree with the findings and the plan of care as documented in the resident's note.         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  Tolerating steroids well.  Will continue plan of steroids through at least tomorrow. If stable will plan on single infusion of Rituxan prior to discharge.  Very mild LUE/LLE proximal weakness on exam.  No upgoing toe on L.  R afferent pupillary defect noted.      Dali Diaz DO   of Neurology

## 2019-03-04 NOTE — PLAN OF CARE
Status: Pt admitted d/t recent dx of anti-MOG syndrome.    VS: VSS on RA.   Neuros: AOx4. All neuros intact besides mild L side weakness. R eye with visual cut.  GI: Regular diet. No BM this shift.  : Voiding without issue. ?  IV: PIV SL in btwn steroid therapy.  Activity: IND.  Pain: Given tramadol x1 for HA.  Respiratory/Trach: LS clear.   Skin: Intact.  Social: No visitors this shift.   Plan of care: Continue to monitor and follow POC.

## 2019-03-04 NOTE — PLAN OF CARE
Status: Pt admitted d/t recent dx of anti-MOG syndrome.    VS: Mildly hypertensive but within parameters otherwise stable.   Neuros: A/Ox4. Mild L sided weakness. R eye with some visual field cuts.   GI: Regular diet, tolerating well. Bowel sounds audible and normoactive in all quadrants. Last BM 03/03.   : Voiding spontaneously without difficulty.   Respiratory: WDL.  Skin: WDL.  IV: PIV SL.   Activity: Independent.   Pain: Headache controlled with Tylenol.   Plan of care:  Continue to monitor and follow POC.

## 2019-03-05 LAB
AMYLASE SERPL-CCNC: 29 U/L (ref 30–110)
ANION GAP SERPL CALCULATED.3IONS-SCNC: 10 MMOL/L (ref 3–14)
BUN SERPL-MCNC: 28 MG/DL (ref 7–30)
CALCIUM SERPL-MCNC: 8.7 MG/DL (ref 8.5–10.1)
CHLORIDE SERPL-SCNC: 106 MMOL/L (ref 94–109)
CO2 SERPL-SCNC: 26 MMOL/L (ref 20–32)
CREAT SERPL-MCNC: 0.62 MG/DL (ref 0.52–1.04)
ERYTHROCYTE [DISTWIDTH] IN BLOOD BY AUTOMATED COUNT: 14.6 % (ref 10–15)
GFR SERPL CREATININE-BSD FRML MDRD: >90 ML/MIN/{1.73_M2}
GLUCOSE SERPL-MCNC: 166 MG/DL (ref 70–99)
HCT VFR BLD AUTO: 41.8 % (ref 35–47)
HGB BLD-MCNC: 13.5 G/DL (ref 11.7–15.7)
MCH RBC QN AUTO: 31.3 PG (ref 26.5–33)
MCHC RBC AUTO-ENTMCNC: 32.3 G/DL (ref 31.5–36.5)
MCV RBC AUTO: 97 FL (ref 78–100)
PLATELET # BLD AUTO: 129 10E9/L (ref 150–450)
POTASSIUM SERPL-SCNC: 4.4 MMOL/L (ref 3.4–5.3)
RBC # BLD AUTO: 4.32 10E12/L (ref 3.8–5.2)
SODIUM SERPL-SCNC: 141 MMOL/L (ref 133–144)
WBC # BLD AUTO: 12.9 10E9/L (ref 4–11)

## 2019-03-05 PROCEDURE — 40000141 ZZH STATISTIC PERIPHERAL IV START W/O US GUIDANCE

## 2019-03-05 PROCEDURE — 25000128 H RX IP 250 OP 636: Performed by: STUDENT IN AN ORGANIZED HEALTH CARE EDUCATION/TRAINING PROGRAM

## 2019-03-05 PROCEDURE — 82150 ASSAY OF AMYLASE: CPT | Performed by: PSYCHIATRY & NEUROLOGY

## 2019-03-05 PROCEDURE — 85027 COMPLETE CBC AUTOMATED: CPT | Performed by: PSYCHIATRY & NEUROLOGY

## 2019-03-05 PROCEDURE — 25800030 ZZH RX IP 258 OP 636: Performed by: STUDENT IN AN ORGANIZED HEALTH CARE EDUCATION/TRAINING PROGRAM

## 2019-03-05 PROCEDURE — 40000893 ZZH STATISTIC PT IP EVAL DEFER

## 2019-03-05 PROCEDURE — 25000132 ZZH RX MED GY IP 250 OP 250 PS 637: Performed by: STUDENT IN AN ORGANIZED HEALTH CARE EDUCATION/TRAINING PROGRAM

## 2019-03-05 PROCEDURE — 12000001 ZZH R&B MED SURG/OB UMMC

## 2019-03-05 PROCEDURE — 25000131 ZZH RX MED GY IP 250 OP 636 PS 637: Performed by: STUDENT IN AN ORGANIZED HEALTH CARE EDUCATION/TRAINING PROGRAM

## 2019-03-05 PROCEDURE — 80048 BASIC METABOLIC PNL TOTAL CA: CPT | Performed by: PSYCHIATRY & NEUROLOGY

## 2019-03-05 PROCEDURE — 36415 COLL VENOUS BLD VENIPUNCTURE: CPT | Performed by: PSYCHIATRY & NEUROLOGY

## 2019-03-05 RX ADMIN — SODIUM CHLORIDE 500 MG: 9 INJECTION, SOLUTION INTRAVENOUS at 09:14

## 2019-03-05 RX ADMIN — TRAMADOL HYDROCHLORIDE 50 MG: 50 TABLET, COATED ORAL at 12:40

## 2019-03-05 RX ADMIN — ACETAMINOPHEN 1000 MG: 500 TABLET, FILM COATED ORAL at 21:51

## 2019-03-05 RX ADMIN — ENOXAPARIN SODIUM 40 MG: 40 INJECTION SUBCUTANEOUS at 18:13

## 2019-03-05 RX ADMIN — SODIUM CHLORIDE 500 MG: 9 INJECTION, SOLUTION INTRAVENOUS at 19:05

## 2019-03-05 RX ADMIN — VITAMIN D, TAB 1000IU (100/BT) 5000 UNITS: 25 TAB at 17:02

## 2019-03-05 RX ADMIN — Medication 1 MG: at 21:51

## 2019-03-05 RX ADMIN — PANTOPRAZOLE SODIUM 40 MG: 40 TABLET, DELAYED RELEASE ORAL at 17:03

## 2019-03-05 RX ADMIN — ONDANSETRON 4 MG: 2 INJECTION INTRAMUSCULAR; INTRAVENOUS at 08:05

## 2019-03-05 RX ADMIN — ONDANSETRON 4 MG: 4 TABLET, ORALLY DISINTEGRATING ORAL at 21:51

## 2019-03-05 RX ADMIN — ACETAMINOPHEN 1000 MG: 500 TABLET, FILM COATED ORAL at 08:11

## 2019-03-05 ASSESSMENT — ACTIVITIES OF DAILY LIVING (ADL)
ADLS_ACUITY_SCORE: 9

## 2019-03-05 NOTE — PLAN OF CARE
PT 6A: cancel/defer - pt encountered at bedside. Pt is currently IND with bed mobility, functional transfers and ambulation in hallway. Reports mild weakness of LLE that has improved, limited by depth perception and visual field deficits. Denies concern with return home and reports assist can be available. Pt will benefit from OP PT to address ongoing weakness as well as OP OT for vision therapy. Pt is familiar with [facility] and [facility] which offers these services. Will complete orders and sign off.

## 2019-03-05 NOTE — PLAN OF CARE
Status: Pt admitted d/t recent dx of anti-MOG syndrome.    VS: Mildly hypertensive but within parameters otherwise stable.   Neuros: A/Ox4. Mild L sided weakness. R eye with some visual field cuts.   GI: Regular diet, tolerating well. Some upper abdominal pain today and nausea, controlled with Zofran IV. Bowel sounds audible and normoactive in all quadrants. Last BM 03/04.   : Voiding spontaneously without difficulty.   Respiratory: WDL.  Skin: WDL.  IV: PIV SL.   Activity: Independent.   Pain: Headache controlled with Tylenol and Tramadol PRN.   Plan of care:  Concern for pancreatitis earlier today due to abdominal pain and nausea, Amylase normal so methylprednisone was continued. Continue to monitor and follow POC.

## 2019-03-05 NOTE — PROGRESS NOTES
"[facility]  Neurology Progress Note    Patient Name: Kerrie Patel  Patient MRN: [id number]  Admission Date: 3/2/2019  Today's Date: 03/05/2019    Subjective:  Some nausea + abdominal tightness. Improved following zofran. Feels vision is improving (area of \"blackout\" now have blue colors/splotches shining through). No other changes noted.     Review of Systems:  10 point review of systems was negative except as indicated in subjective section.    Objective:  /86   Pulse 68   Temp 98.1  F (36.7  C) (Oral)   Resp 16   Wt 79 kg (174 lb 1.6 oz)   SpO2 96%   BMI 32.90 kg/m    General: NAD  Cardio: NSR  Pulm: nonlabored on room air  ABD: BS+.  EXT: No peripheral edema, no focal lesions  Neuro:  -MS: alert, speech fluent and coherent  -CN: peripheral vff (although areas of central vision absent), eomi, facial sensation and movement intact b/l, hearing intact to conversation, palate raises symmetrically, shoulder shrug and scm intact, tongue midline  -Motor: tone and bulk normal   --LUE: 5/5 arm flex, 4+/5 ext, 4/5wrist flex/ext,  5/5  - RUE: 5/5 arm flex, ext, wrist flex/ext, finger flex/ext/abd/add  --LLE: 5- /5 hip flexor, 4/5 knee flex/ext, 5/5 plantar/dorsiflexion  --RLE: 5/5 hip flexor, leg flex/ext, plantar/dorsiflexion  -Reflexes: normoactive and symmetric at achilles, patella, brachioradialis, and biceps. Toes downgoing b/l  -Sensory: intact to LT in all extremities  -Coordination: intact to FNF, H2S b/l  -Gait: deferred     Investigations:  No new for review.    Assessment and Plan:  Kerrie Patel is a 48 year old female w/ anti-MOG syndrome who presenting with right eye pain and vision changes that rapidly developed over 48 hours on 1/17/19.  She was diagnosed with optic neuritis, treated with steroids and 5 rounds of plasma exchange.  She had a recent MRI cervical spine which showed a putative enhancing lesion in the anterior area of the cervical spine, which prompted " admission for treatment with high-dose steroids followed by rituximab infusion    #anti-MOG syndrome:  -Solu-Medrol 500mg IV daily; anticipate total of 3-5days but will treat on daily basis (first dose was on 3/2)   -initiate Rituximab once steroids are done   [ x ] HepBSAg + HepBCAb + QuantGold negative before giving Rituximab   - ppi   - Vit D   - neuro checks and vitals Q8H    #Hx of Pancreatitis following IV solu-medrol:   #Nausea, abdominal tightness 3/5: normal amylase this morning, nausea controlled  Well with zofran  Amylase levels here have been normal  - daily lipase   - Zofran prn  -Continue twice daily steroid dosing    Activity: Up ad rudy  Diet: Regular diet with thin liquids  IVF: Not indicated  DVT ppx: Lovenox  Bowel ppx: Senna/docusate 2 tabs daily  Lines: PIV  Code: Full code    Dispo: Pending 3-5 days of steroids and after receiving first dose of Rituximab.    The plan was formulated with the Neurology attending, Dr. Diaz.    Mario Rosario MD on 3/5/2019 at 3:32 PM

## 2019-03-05 NOTE — PLAN OF CARE
Status: Pt admitted r/t anti-MOG syndrome.  Vs: VSS  Neuros: A/O x4, R eye central field cuts/blurred vision.   Gi: Regular diet. BM 3/3. BS active.   Gu: Voiding spontaneously without difficulty  Iv: PIV SL  Activity: Up independently  Pain: Tramadol given x1 for headache  Respiratory/Trach: O2 sat stable, Ra, Lungs clear.  Skin: Intact  Social: Multiple family/friend phone calls throughout shift.  Plan of care: Continue to monitor patient and follow POC.

## 2019-03-05 NOTE — PLAN OF CARE
Status: Pt admitted d/t recent dx of anti-MOG syndrome.    VS: VSS on RA.   Neuros: AOx4. All neuros intact besides mild L side weakness. R eye with visual cut.  GI: Regular diet. No BM this shift.  : Voiding without issue. ?  IV: PIV SL in btwn steroid therapy.  Activity: IND.  Pain: Denies.  Respiratory/Trach: LS clear.   Skin: Intact.  Social: No visitors this shift.   Plan of care: Continue to monitor and follow POC.

## 2019-03-06 VITALS
WEIGHT: 174.1 LBS | OXYGEN SATURATION: 97 % | SYSTOLIC BLOOD PRESSURE: 144 MMHG | TEMPERATURE: 98.5 F | RESPIRATION RATE: 16 BRPM | BODY MASS INDEX: 32.9 KG/M2 | HEART RATE: 61 BPM | DIASTOLIC BLOOD PRESSURE: 74 MMHG

## 2019-03-06 LAB
ANION GAP SERPL CALCULATED.3IONS-SCNC: 9 MMOL/L (ref 3–14)
BUN SERPL-MCNC: 32 MG/DL (ref 7–30)
CALCIUM SERPL-MCNC: 8.5 MG/DL (ref 8.5–10.1)
CHLORIDE SERPL-SCNC: 106 MMOL/L (ref 94–109)
CO2 SERPL-SCNC: 27 MMOL/L (ref 20–32)
CREAT SERPL-MCNC: 0.72 MG/DL (ref 0.52–1.04)
ERYTHROCYTE [DISTWIDTH] IN BLOOD BY AUTOMATED COUNT: 14.7 % (ref 10–15)
GFR SERPL CREATININE-BSD FRML MDRD: >90 ML/MIN/{1.73_M2}
GLUCOSE SERPL-MCNC: 183 MG/DL (ref 70–99)
HCT VFR BLD AUTO: 40.1 % (ref 35–47)
HGB BLD-MCNC: 13 G/DL (ref 11.7–15.7)
MCH RBC QN AUTO: 31.3 PG (ref 26.5–33)
MCHC RBC AUTO-ENTMCNC: 32.4 G/DL (ref 31.5–36.5)
MCV RBC AUTO: 97 FL (ref 78–100)
PLATELET # BLD AUTO: 130 10E9/L (ref 150–450)
POTASSIUM SERPL-SCNC: 4.2 MMOL/L (ref 3.4–5.3)
RBC # BLD AUTO: 4.15 10E12/L (ref 3.8–5.2)
SODIUM SERPL-SCNC: 142 MMOL/L (ref 133–144)
WBC # BLD AUTO: 10.1 10E9/L (ref 4–11)

## 2019-03-06 PROCEDURE — 85027 COMPLETE CBC AUTOMATED: CPT | Performed by: PSYCHIATRY & NEUROLOGY

## 2019-03-06 PROCEDURE — 25800030 ZZH RX IP 258 OP 636: Performed by: PSYCHIATRY & NEUROLOGY

## 2019-03-06 PROCEDURE — 25000132 ZZH RX MED GY IP 250 OP 250 PS 637: Performed by: STUDENT IN AN ORGANIZED HEALTH CARE EDUCATION/TRAINING PROGRAM

## 2019-03-06 PROCEDURE — 36415 COLL VENOUS BLD VENIPUNCTURE: CPT | Performed by: PSYCHIATRY & NEUROLOGY

## 2019-03-06 PROCEDURE — 25000128 H RX IP 250 OP 636: Performed by: PSYCHIATRY & NEUROLOGY

## 2019-03-06 PROCEDURE — 80048 BASIC METABOLIC PNL TOTAL CA: CPT | Performed by: PSYCHIATRY & NEUROLOGY

## 2019-03-06 PROCEDURE — 40000141 ZZH STATISTIC PERIPHERAL IV START W/O US GUIDANCE

## 2019-03-06 PROCEDURE — 25000132 ZZH RX MED GY IP 250 OP 250 PS 637: Performed by: PSYCHIATRY & NEUROLOGY

## 2019-03-06 RX ORDER — SULFAMETHOXAZOLE/TRIMETHOPRIM 800-160 MG
1 TABLET ORAL
Qty: 12 TABLET | Refills: 1 | Status: CANCELLED | OUTPATIENT
Start: 2019-03-06

## 2019-03-06 RX ORDER — SULFAMETHOXAZOLE/TRIMETHOPRIM 800-160 MG
1 TABLET ORAL
Qty: 24 TABLET | Refills: 0 | Status: SHIPPED | OUTPATIENT
Start: 2019-03-08 | End: 2019-04-25

## 2019-03-06 RX ORDER — DIPHENHYDRAMINE HCL 25 MG
50 CAPSULE ORAL ONCE
Status: COMPLETED | OUTPATIENT
Start: 2019-03-06 | End: 2019-03-06

## 2019-03-06 RX ORDER — SULFAMETHOXAZOLE/TRIMETHOPRIM 800-160 MG
1 TABLET ORAL
Status: DISCONTINUED | OUTPATIENT
Start: 2019-03-06 | End: 2019-03-06 | Stop reason: HOSPADM

## 2019-03-06 RX ORDER — SODIUM CHLORIDE 9 MG/ML
INJECTION, SOLUTION INTRAVENOUS CONTINUOUS PRN
Status: DISCONTINUED | OUTPATIENT
Start: 2019-03-06 | End: 2019-03-06 | Stop reason: HOSPADM

## 2019-03-06 RX ORDER — ACETAMINOPHEN 325 MG/1
650 TABLET ORAL ONCE
Status: COMPLETED | OUTPATIENT
Start: 2019-03-06 | End: 2019-03-06

## 2019-03-06 RX ORDER — ALBUTEROL SULFATE 90 UG/1
2 AEROSOL, METERED RESPIRATORY (INHALATION)
Status: DISCONTINUED | OUTPATIENT
Start: 2019-03-06 | End: 2019-03-06 | Stop reason: HOSPADM

## 2019-03-06 RX ORDER — PREDNISONE 20 MG/1
TABLET ORAL
Qty: 126 TABLET | Refills: 0 | Status: SHIPPED | OUTPATIENT
Start: 2019-03-06 | End: 2019-04-25

## 2019-03-06 RX ORDER — METHYLPREDNISOLONE SODIUM SUCCINATE 125 MG/2ML
125 INJECTION, POWDER, LYOPHILIZED, FOR SOLUTION INTRAMUSCULAR; INTRAVENOUS ONCE
Status: COMPLETED | OUTPATIENT
Start: 2019-03-06 | End: 2019-03-06

## 2019-03-06 RX ORDER — MEPERIDINE HYDROCHLORIDE 50 MG/ML
25 INJECTION INTRAMUSCULAR; INTRAVENOUS; SUBCUTANEOUS EVERY 30 MIN PRN
Status: DISCONTINUED | OUTPATIENT
Start: 2019-03-06 | End: 2019-03-06 | Stop reason: HOSPADM

## 2019-03-06 RX ORDER — PANTOPRAZOLE SODIUM 40 MG/1
40 TABLET, DELAYED RELEASE ORAL DAILY
Qty: 30 TABLET | Refills: 5 | Status: SHIPPED | OUTPATIENT
Start: 2019-03-06 | End: 2019-05-31

## 2019-03-06 RX ORDER — DIPHENHYDRAMINE HYDROCHLORIDE 50 MG/ML
50 INJECTION INTRAMUSCULAR; INTRAVENOUS
Status: DISCONTINUED | OUTPATIENT
Start: 2019-03-06 | End: 2019-03-06 | Stop reason: HOSPADM

## 2019-03-06 RX ORDER — ALBUTEROL SULFATE 0.83 MG/ML
2.5 SOLUTION RESPIRATORY (INHALATION)
Status: DISCONTINUED | OUTPATIENT
Start: 2019-03-06 | End: 2019-03-06 | Stop reason: HOSPADM

## 2019-03-06 RX ORDER — PREDNISONE 5 MG/1
TABLET ORAL
Qty: 7 TABLET | Refills: 0 | Status: SHIPPED | OUTPATIENT
Start: 2019-03-06 | End: 2019-04-25

## 2019-03-06 RX ADMIN — PANTOPRAZOLE SODIUM 40 MG: 40 TABLET, DELAYED RELEASE ORAL at 08:28

## 2019-03-06 RX ADMIN — RITUXIMAB 1000 MG: 10 INJECTION, SOLUTION INTRAVENOUS at 11:54

## 2019-03-06 RX ADMIN — METHYLPREDNISOLONE SODIUM SUCCINATE 125 MG: 125 INJECTION, POWDER, LYOPHILIZED, FOR SOLUTION INTRAMUSCULAR; INTRAVENOUS at 11:09

## 2019-03-06 RX ADMIN — DIPHENHYDRAMINE HYDROCHLORIDE 50 MG: 25 CAPSULE ORAL at 11:10

## 2019-03-06 RX ADMIN — VITAMIN D, TAB 1000IU (100/BT) 5000 UNITS: 25 TAB at 08:26

## 2019-03-06 RX ADMIN — ACETAMINOPHEN 650 MG: 325 TABLET, FILM COATED ORAL at 11:10

## 2019-03-06 RX ADMIN — ACETAMINOPHEN 1000 MG: 500 TABLET, FILM COATED ORAL at 07:06

## 2019-03-06 RX ADMIN — SULFAMETHOXAZOLE AND TRIMETHOPRIM 1 TABLET: 800; 160 TABLET ORAL at 14:39

## 2019-03-06 ASSESSMENT — ACTIVITIES OF DAILY LIVING (ADL)
ADLS_ACUITY_SCORE: 9

## 2019-03-06 NOTE — PLAN OF CARE
Status: Pt admitted for tx of anti-MOG syndrome.    VS: VSS except HTN within parameters  Neuros: A/Ox4. Neuro stable w/field cuts in R eye, L slightly weaker than R  GI: Tolerating a reg diet w/fair intake. Pt c/o of feeling full and nausea, relieved w/PRN zofran x1.   : Voiding spont  Respiratory: LS clear  Skin: intact  IV: PIV SL.   Activity: Up ind  Pain: Headache controlled w/PRN tylenol  Plan of care: Pt will have dose of rituximab tomorrow morning. Possible discharge home tomorrow.

## 2019-03-06 NOTE — PLAN OF CARE
Pt admitted for tx of anti-MOG syndrome.  VSS on RA. A&Ox4. Neuros include: R. field cut, denied any blurry/double vision, 5/5 t/o with L. side slightly weaker than R. Tolerating regular diet. Reports relief of nausea after evening Zofran with no reoccurrence overnight. Voids spontaneously without difficulty. No BM this shift; last BM 3/4; +passing flatus. Up independently. L. PIV, SL. Had no c/o pain, denied any HA. Steroids completed yesterday and will do dose of rituximab this morning prior to d/c. Possible discharge home today. Continue to monitor and follow POC.

## 2019-03-06 NOTE — PLAN OF CARE
Pt admitted for tx of anti-MOG syndrome.  VSS on RA. A&Ox4. Neuros include: R. field cut, c/o any blurry vision, 5/5 t/o with L. side slightly weaker than R. Tolerating regular diet. Rituxin IV infusion (delayed 2nd to PIV loss and titrated slow 2nd to response (pt did experience HTN/julio). Currently tolerating @ 250 mls/hr. Spouse @ bedside. Up w/ asst of 1.

## 2019-03-07 ENCOUNTER — TELEPHONE (OUTPATIENT)
Dept: NEUROLOGY | Facility: CLINIC | Age: 49
End: 2019-03-07

## 2019-03-07 DIAGNOSIS — M62.81 GENERALIZED MUSCLE WEAKNESS: ICD-10-CM

## 2019-03-07 DIAGNOSIS — M62.81 MUSCLE WEAKNESS (GENERALIZED): ICD-10-CM

## 2019-03-07 DIAGNOSIS — G37.9 DEMYELINATING DISEASE OF THE SPINAL CORD (H): Primary | ICD-10-CM

## 2019-03-07 RX ORDER — METHYLPREDNISOLONE SODIUM SUCCINATE 125 MG/2ML
125 INJECTION, POWDER, LYOPHILIZED, FOR SOLUTION INTRAMUSCULAR; INTRAVENOUS ONCE
Status: CANCELLED | OUTPATIENT
Start: 2019-03-20

## 2019-03-07 RX ORDER — DIPHENHYDRAMINE HCL 25 MG
50 CAPSULE ORAL ONCE
Status: CANCELLED
Start: 2019-03-20

## 2019-03-07 RX ORDER — ACETAMINOPHEN 325 MG/1
650 TABLET ORAL ONCE
Status: CANCELLED
Start: 2019-03-20

## 2019-03-07 NOTE — PROGRESS NOTES
Pt tolerated Rituxan IV well. (in the beginning mild julio and some HTN but it improved as TX cont. Pt did not want to adv. Infusion above 250 mls/hr which prolonged infusion and lengthened discharge. She lost an IV during Rituxan and after replacement she was  fearful it would happen again.pt discharged w/ spouse via WC . N- stable

## 2019-03-07 NOTE — TELEPHONE ENCOUNTER
Prior Authorization Infusion/Clinic Administered Request    Location: [facility]  Diagnosis and ICD:   Drug/Therapy: Anti-MOG, demyelinating disease of spinal cord, G37.9    Previously Tried and Failed Therapies: n/a    Date of provider note with supporting information: 2/28/19    Urgency (When is the patient scheduled?): ASAP - Next dose due and scheduled 3/27/19.    Would you like to include any research articles? n/a        If yes please call [phone] for further instructions about sending that information

## 2019-03-07 NOTE — TELEPHONE ENCOUNTER
I talked with Dr. Barragan, and she is okay with us pushing her next Rituximab dose out another week, that way, the patient can make just one trip down; I called [facility] and scheduled Kerrie for the Rituximab infusion on Wednesday, March 27th at 11am, then I have put her in with Dr. Barragan for 5:30pm that same day; I called Ruperto and Kerrie, and they can make this work; I will send a PA request over to our infusion finance team; Ruperto also had some questions about the financial side of this, which I told him I will ask someone from our finance team to call him.    Sana Carcamo, MS RN Care Coordinator

## 2019-03-07 NOTE — TELEPHONE ENCOUNTER
Patient was discharged from [facility] 3/6/19, after receiving her first dose of Rituximab; She is due to have her second dose of Rituximab on 3/20/19, then have a follow up appointment with Dr. Barragan after; The patient was hoping to infuse close to home, however, Dr. Barragan prefers the patient infuse at a [place] location; I called Kerrie and Ruperto to discuss the various options for infusion locations; Ultimately, they would like to come here to the [facility] for the infusion, then see Dr. Barragan afterwards, given they have a 3 hour drive; The complicating factor is that the day she is due for her next infusion, Dr. Barragan is attending and doesn't have clinic that day; I told Kerrie and Ruperto that I will need to talk with Dr. Barragan, then get back with them back.    Sana Carcamo, MS RN Care Coordinator

## 2019-03-09 NOTE — TELEPHONE ENCOUNTER
Location [facility].     I have submitted a Rituxan prior auth request for both [employer] plans. Once we have the determinations we can contact the family regarding any out of pocket costs.

## 2019-03-09 NOTE — DISCHARGE SUMMARY
[facility]  NEUROLOGY DISCHARGE SUMMARY    Patient Name:  Kerrie Patel  MRN:  [id number]      :  1970      Date of Admission:  3/2/2019  Date of Discharge:  3/6/2019  7:02 PM  Admitting Physician:  Janet Ferris MD  Discharge Physician:  Dali Diaz MD  Primary Care Provider:   Coby Higgins  Discharge Disposition:   Discharged to home    Admission Diagnoses:  Anti-MOG syndrome    Discharge Diagnoses:    Anti-MOG syndrome    Brief History of Illness:   Per HPI on 3/2/19:    Kerrie Patel is a 48-year-old woman who presents for direct admission to the General Neurology Hospitalist Service on the advice of Dr. Lea Barragan.  Please see Dr. Barragan's note from 2019, for full details of her recent history.  To briefly summarize, the patient developed symptoms of right eye pain and vision changes that rapidly progressed over 48 hours on 2019.  She was diagnosed with optic neuritis.  The patient was subsequently treated with high-dose I.V. steroids and a course that was complicated by the development of pancreatitis.  She has regained some of the vision on the lateral aspects of the right-sided vision but remains with a central scotoma.  The patient received not only Solu-Medrol treatments but plasma exchange treatments x5.  More recently she has developed some left-sided weakness that is noticeable in her arm and her leg.  She reports starting to notice these symptoms earlier this week.  She denies having falls but does notice that she has to concentrate more on her walking.      The patient has been thoroughly evaluated by Dr. Barragan and Dr. Kaur in Neuro-Ophthalmology.  She has MRI imaging of the brain, cervical and thoracic spine that show T2 hyperintense lesions.  In the brain, there 5 foci of T2 hyperintense lesions, none of which are enhancing with gadolinium dye.  There is also decreased enhancement and perineural edema in the left optic nerve,  compatible with improving optic neuritis on the most recent MRI scan of the brain.  MRI of the cervical spine on 02/28 revealed multilevel cervical spondylosis with only mild spinal canal and left neural foraminal stenosis at C6-7.  The study was read as showing no abnormal enhancement or cord abnormality, but on my review of the imaging as well as Yung's review of the imaging, the patient has a T2 hyperintensity on sagittal STIR with an anterior area of enhancement also present.  Due to this finding, the patient has been directed for admission and treatment with high-dose Solu-Medrol followed by rituximab infusions    Hospital Course:  Patient presented as a direct admit for IV steroids followed by rituximab. Patient received 3 days of 1000mg IV methylpred (divided into 500mg BID doses due to hx of pancreatitis after receiving high dose steroids at previous hospitalization). Amylase was trended daily, which remained wnl throughout. OVer course of hospitalization, patient had modest improvement / changes of visual symptoms as well as more notable improvement in L sided strength. Evaluated by physical therapy while hospitalized, recommended OP PT + OT for vision therapy. Patient received rituximab on day of discharge (Following negative HepB+hepC+QuantGoal eval). Discharged in improving condition w/ plan to continue prednisone @ 80mg and decrease by 10 each week until 5mg for one week then stop; she was also prescribed calcium, Vit D, protonix and bactrim ppx.    Pertinent Investigations:  Amylase - wnl x 4  Hep B Core/Surface - negative  Quant Gold - negative    Consultations:    Physical Therapy    Recommendations and Follow-up:  - OP PT + OT vision training  - Prednisone taper from 80 as described below; Vit D, C, protonix, bactrim ppx  - F/u w/ Dr. Barragan in clinic    Discharge physical examination:   /74 (BP Location: Right arm)   Pulse 61   Temp 98.5  F (36.9  C)   Resp 16   Wt 79 kg (174 lb 1.6 oz)    SpO2 97%   BMI 32.90 kg/m    General: NAD  HEENT: NC/AT  Cardiac: RRR  Pulm: nonlabored on room air  ABD: BS+.  EXT: No peripheral edema, no focal lesions  Neuro:  -MS: alert, speech fluent and coherent, naming + repetition intact + comprehension intact.  -CN: peripheral vF intact (although areas of central vision absent), no APD eomi, facial sensation and movement intact b/l, hearing intact to conversation, palate raises symmetrically, shoulder shrug and scm intact, tongue midline  -Motor: tone and bulk normal   --LUE: 5/5 arm flex (mild asym w/ L marginally weaker than R), 4+/5 wrist ext, 4/5 wrist flex,  5-/5  - RUE: 5/5 arm flex, ext, wrist flex/ext, finger flex/ext/abd/add  --LLE: 5- /5 hip flexor, 5/5 knee flex/ext, 5/5 plantar/dorsiflexion  --RLE: 5/5 hip flexor, leg flex/ext, plantar/dorsiflexion  -Reflexes: normoactive and symmetric at achilles, patella, brachioradialis, and biceps. Toes downgoing b/l  -Sensory: intact to LT in all extremities  -Coordination: intact to FNF, H2S b/l  -Gait: intact, steady casual gait        Discharge Medications:  Discharge Medication List as of 3/6/2019  6:07 PM      START taking these medications    Details   calcium carbonate (OS-LISA) 1500 (600 Ca) MG tablet Take 1 tablet (600 mg) by mouth daily, Disp-30 tablet, R-11, E-Prescribe      Cholecalciferol 5000 units TABS Take 5,000 Units by mouth daily, Disp-30 tablet, R-11, E-Prescribe      pantoprazole (PROTONIX) 40 MG EC tablet Take 1 tablet (40 mg) by mouth daily Take daily as long as you are taking steroids (e.g. Prednisone). Can stop after being tapered off of steroids but discuss this with your doctor before discontinuing., Disp-30 tablet, R-5, Local Print      sulfamethoxazole-trimethoprim (BACTRIM DS/SEPTRA DS) 800-160 MG tablet Take 1 tablet by mouth three times a week For 2 months, Disp-24 tablet, R-0, E-Prescribe         CONTINUE these medications which have CHANGED    Details   !! predniSONE (DELTASONE) 20  MG tablet Take 80 mg by mouth daily for 7 days, THEN 70 mg daily for 7 days, THEN 60 mg daily for 7 days, THEN 50 mg daily for 7 days, THEN 40 mg daily for 7 days, THEN 30 mg daily for 7 days, THEN 20 mg daily for 7 days, THEN 10 mg daily for 7 days., Disp-126 tab let, R-0, Local PrintPlease include the quantity of tablets and (strength) per dose in sig.      !! predniSONE (DELTASONE) 5 MG tablet After you have decreased your prednisone dose to 10mg per day and have completed 7 days of this dosage, take 5mg per day for 7 days before stopping completely.., Disp-7 tablet, R-0, Local PrintPlease include the quantity of tablets and (strength) per do se in sig.       !! - Potential duplicate medications found. Please discuss with provider.      CONTINUE these medications which have NOT CHANGED    Details   diazepam (VALIUM) 5 MG tablet Take 1 tablet (5 mg) by mouth once as needed for anxiety, Disp-2 tablet, R-0, Local PrintTake 30 min before MRI, repeat after 1 hour if necessary      HYDROcodone-acetaminophen (NORCO) 5-325 MG tablet Take 5-325 tablets by mouth every 6 hours as needed, R-0, Historical      traMADol (ULTRAM) 50 MG tablet Take 1 tablet (50 mg) by mouth every 4 hours as needed for pain or severe pain, Disp-12 tablet, R-0, Local Print         STOP taking these medications       acetaminophen (TYLENOL) 500 MG tablet Comments:   Reason for Stopping:               Discharge follow up and instructions:     PHYSICAL THERAPY REFERRAL      OCCUPATIONAL THERAPY REFERRAL      Reason for your hospital stay    You were admitted the hospital due to headache, vision changes and left sided weakness, thought to be from your anti-MOG syndrome. You were treated with IV steroids and following this course, you were given rituximab.     Adult [facility] Follow-up and recommended labs and tests    Follow up with your neurologist after discharge. We will contact you to arrange an appropriate time for follow up with   Yung.    Appointments on [place] and/or [facility] (with [facility] or [facility] provider or service). Call [phone] if you haven't heard regarding these appointments within 7 days of discharge.     Activity    Your activity upon discharge: activity as tolerated     Discharge Instructions    1. Continue taking prednisone as prescribed ; taper/decrease your dose by 10mg every week; after you've decreased your dose to 10mg per day and have completed 7 days at this dose, decrease your dose again to 5mg per day for 7 days before stopping completely. While on steroids, please continue to take pantoprazole or similar acid blocker to prevent stomach ulcersas well as bactrim (or prophylactic antibiotic) to prevent steroid-related infections.    2. Continue taking Vitamin D and Calcium as prescribed.   3. Follow up with Dr. aJckson in clinic ; we will contact you to arrange a follow up appointment, if you do not hear from us in the next weeks, please contact us at the Neurology clinic to arrange further follow up.     When to contact your care team    Call your primary neurologist if your symptoms are getting worse.  If you have new symptoms related to weakness, numbness, tingling, vision loss, dizziness, vertigo/world-spinning, please call 911 and/or return to the emergency room for further evaluation.  If you have sudden onset/worsening of abdominal pain, nausea, vomiting, call your primary doctor and / or return to the emergency room for further evaluation.     Full Code     Diet    Follow this diet upon discharge: Orders Placed This Encounter      Combination Diet Regular Diet Adult       Patient seen and discussed with Dr. Dali Diaz.    Mario Rosario MD

## 2019-03-12 NOTE — TELEPHONE ENCOUNTER
We have received an approval form both Kerrie's insurance plans and she can go ahead with the one dose that is ordered.    Her [employer] [place] plan will only approved for 6 months then she would need to transition to either a home infusion service or a free standing infusion site.     If there re any questions please let me know.      Yuly Gonzalez    Infusion  - [facility]  [address]  [facility] [place] [address]  Phone [phone] Fax [phone]  [email or web address]   [email or web address]

## 2019-03-14 NOTE — TELEPHONE ENCOUNTER
They would need to call their insurance for the benefits between her 2 coverage's, I will call and let her know

## 2019-03-15 ENCOUNTER — HOSPITAL ENCOUNTER (OUTPATIENT)
Dept: PHYSICAL THERAPY | Facility: OTHER | Age: 49
Setting detail: THERAPIES SERIES
End: 2019-03-15
Attending: PSYCHIATRY & NEUROLOGY
Payer: COMMERCIAL

## 2019-03-15 ENCOUNTER — HOSPITAL ENCOUNTER (OUTPATIENT)
Dept: OCCUPATIONAL THERAPY | Facility: OTHER | Age: 49
Setting detail: THERAPIES SERIES
End: 2019-03-15
Attending: PSYCHIATRY & NEUROLOGY
Payer: COMMERCIAL

## 2019-03-15 DIAGNOSIS — M62.81 GENERALIZED MUSCLE WEAKNESS: ICD-10-CM

## 2019-03-15 DIAGNOSIS — M62.81 MUSCLE WEAKNESS (GENERALIZED): ICD-10-CM

## 2019-03-15 DIAGNOSIS — G37.9 DEMYELINATING DISEASE OF THE SPINAL CORD (H): ICD-10-CM

## 2019-03-15 PROCEDURE — 97110 THERAPEUTIC EXERCISES: CPT | Mod: GO

## 2019-03-15 PROCEDURE — 97166 OT EVAL MOD COMPLEX 45 MIN: CPT | Mod: GO

## 2019-03-15 PROCEDURE — 97110 THERAPEUTIC EXERCISES: CPT | Mod: GP

## 2019-03-15 PROCEDURE — 97161 PT EVAL LOW COMPLEX 20 MIN: CPT | Mod: GP

## 2019-03-15 NOTE — PROGRESS NOTES
03/15/19 1500   General Information   Start of Care Date 03/15/19   Referring Physician Dali Diaz   Orders Evaluate and Treat as Indicated   Medical Diagnosis generalized weakness, demyelinating disease of spinal cord   Onset of illness/injury or Date of Surgery 01/17/19   Surgical/Medical history reviewed Yes   Pertinent history of current problem (include personal factors and/or comorbidities that impact the POC) January 17th had migraine symptoms and woke up the next morning without sight in her eyes that developed into encephalopathy; was in [facility], treated for the optic neuritis, went home, developed pancreatitis, went home again and kept losing sight. Went to  of  neurology and diagnosed with MOG and is being treated with an infusion. Still has some vision field loss and sees outlines and brighter colors with the right eye. Weakness on left side more so than the right, which just started yesterday. Decreased sensation in her feet. Fatigues very easily standing. Depth perception is off and dropping things. Stair negotiation and balance are problems. Transitions with transfers seem unsteady and she has to take her time more.   Pertinent Visual History  right field loss   Prior level of functional mobility Transfers;Ambulation   Transfers Independent   Ambulation Independent   Diagnostic Tests MRI   MRI Results Results   MRI results 2 lesions   Patient role/Employment history Other/comments  (off currently; works as  but stairs frequently)   Living environment House/townhome   Home/Community Accessibility Comments 2 stories but everything on main level   ADL Devices Shower/Tub Chair   Patient/Family Goals Statement improve strength and mobility and balance   Pain   Patient currently in pain Denies   Strength   Manual Muscle Testing Quick Adds MMT: Hip;MMT: Ankle;MMT: Knee   MMT: Hip, Rehab Eval   Hip Flexion - Left Side (3+/5) fair plus, left   Hip Extension - Left Side (2+/5)  poor plus, left   Hip ABduction - Left Side (3-/5) fair minus, left   Hip Flexion - Right Side (4-/5) good minus, right   Hip Extension - Right Side (3+/5) fair plus, right   Hip ABduction - Right Side (4/5) good, right   MMT: Knee, Rehab Eval   Knee Flexion - Left Side (4-/5) good minus, left   Knee Extension - Left Side (3+/5) fair plus, left   Knee Flexion - Right Side (4/5) good, right   Knee Extension - Right Side (4/5) good, right   MMT: Ankle, Rehab Eval   Ankle Dorsiflexion - Left Side (3+/5) fair plus, left   Ankle Dorsiflexion - Right Side (4/5) good, right   Musculoskeletal Special Tests   Musculoskeletal Special Tests 30 second sit to stand: 10 reps   Balance Special Tests   Balance Special Tests Modified CTSIB Conditions   Balance Special Tests Modified CTSIB Conditions   Condition 1, seconds 30 Seconds   Condition 2, seconds 10 Seconds   Condition 4, seconds 30 Seconds   Condition 5, seconds 5 Seconds   Modality Interventions   Planned Modality Interventions Cryotherapy;Thermotherapy: Hydrocollator Packs   Planned Therapy Interventions   Planned Therapy Interventions balance training;bed mobility training;gait training;joint mobilization;neuromuscular re-education;ROM;strengthening;stretching;transfer training;manual therapy;visual perception   Clinical Impression   Criteria for Skilled Therapeutic Interventions Met yes, treatment indicated   PT Diagnosis impaired mobility   Influenced by the following impairments weakness, balance impairment, decreased endurance   Functional limitations due to impairments walking, standing, transfers   Clinical Presentation Stable/Uncomplicated   Clinical Decision Making (Complexity) Low complexity   Therapy Frequency 2 times/Week   Predicted Duration of Therapy Intervention (days/wks) up to 6 months   Risk & Benefits of therapy have been explained Yes   Patient, Family & other staff in agreement with plan of care Yes   Clinical Impression Comments Pt demonstrates  impaired lower extremity strength, balance and endurance leading to impaired mobility and limited tolerance for normal daily tasks as well as her job.   GOALS   PT Eval Goals 1;2;3   Goal 1   Goal Identifier strength   Goal Description Pt will have improved strength by at least 1 muscle grade for ability to negotiate stairs and stand for showering at least 10 minutes.   Target Date 05/10/19   Goal 2   Goal Identifier balance   Goal Description Pt will have improved score on mCTSIB by at least 10 seconds for improved stability during transitions.   Target Date 05/10/19   Goal 3   Goal Identifier HEP   Goal Description Pt will be independent and consistent with performance of a customized home exercise program for self management of symptoms.   Target Date 05/24/19   Total Evaluation Time   PT Eval, Low Complexity Minutes (71389) 45

## 2019-03-19 NOTE — PROGRESS NOTES
03/15/19 1400   General Information   Start Of Care Date 03/15/19   Referring Physician Dali Diaz MD   Orders Evaluate and treat as indicated   Orders Date 03/07/19   Medical Diagnosis Demyelinating disease of the spinal cord; Generalized muscle weakness   Onset of Illness/Injury or Date of Surgery 01/17/19   Surgical/Medical History Reviewed Yes   Additional Occupational Profile Info/Pertinent History of Current Problem On January 17th patient felt as though she had a migraine.  When she woke up the next morning she could not see out of her R eye.  She was diagnosed and treated for optic neuritis and treatment gave her pancreatitis.  She was sent home and subsequently re-hospitalized for further treatment and eventually diagnosed with MOG.  She has been receiving steroids, plasmaphoresis and Rituximab infusions.  Reports she has 1 more infusion in about a week and then hopefully not another one for 6 months.  Her neurologist told her that she may not regain full functioning.    Comments/Observations MOG   Role/Living Environment   Current Community Support Therapy services   Patient role/Employment history Disabled  ([employer])   Community/Avocational Activities Is on short term disability right now, is hoping to get back to work after April 22nd appointment with Dr. Barragan (neurologist).  Office work, dispatching, special projects-    Current Living Environment House   Number of Stairs to Enter Home 1   Home/Community Accessibility Comments Reports she does not have to negotiate stairs to upper level but challenges herself to do so.    Prior Level - Transfers Independent   Prior Level - Ambulation Independent   Prior Level - ADLS Independent   Prior Responsibilities - IADL Meal Preparation   Prior Level Comments Patient was fully independent working full time prior to this onset.    Role/Living Environment Comments Patient requires SBA for shower transfers, using a shower chair.   Able to dry her hair with hair dryer and stand the whole time today.     Patient/family Goals Statement To return to work, to return to driving, to return to cooking   Pain   Patient currently in pain No   Pain comments No pain at time of evaluation   Fall Risk Screen   Fall screen completed by OT   Have you fallen 2 or more times in the past year? No   Have you fallen and had an injury in the past year? No   Is patient a fall risk? Yes   Fall screen comments Already has PT scheduled   Abuse Screen (yes response referral indicated)   Feels Unsafe at Home or Work/School no   Feels Threatened by Someone no   Does Anyone Try to Keep You From Having Contact with Others or Doing Things Outside Your Home? no   Physical Signs of Abuse Present no   Cognitive Status Examination   Orientation Orientation to person, place and time   Cognitive Comment Reports she initially had slow processing but feels as though it has resolved.    Visual Perception   Visual Perception Comments Continues to have limited vision in R eye, but is now seeing some light and bright colors.    Sensation   Sensation Comments R hand and R foot- very light tingling with pressure- started today.  L mid-upper arm to fingertips- light tingle (sleeping feeling)- pretty constant.    Range of Motion (ROM)   ROM Comments WFL BUE   Strength   Strength Comments RUE 4+/5, wrist 4/5; LUE 4/5 throughout   Hand Strength   Hand Dominance Right   Left Hand  (pounds) 21 pounds   Right Hand  (pounds) 26 pounds   Left Lateral Pinch (pounds) 11 pounds   Right Lateral Pinch (pounds) 12 pounds   Left Three Point Pinch (pounds) 9 pounds   Right Three Point Pinch (pounds) 10 pounds   Hand Strength Comments Decreased strength bilaterally   Coordination   Left Hand, Nine Hole Peg Test (seconds) 18   Right Hand, Nine Hole Peg Test (seconds) 17   Coordination Comments BUE FMC intact   Planned Therapy Interventions   Planned Therapy Interventions ADL training;Cognitive  skills;Community/work reintegration;Coordination training;Manual therapy;Neuromuscular re-education;ROM;Self care/Home management;Strengthening;Stretching;Therapeutic activities;Visual perception   Planned Modalities Hot/cold packs;Paraffin bath;Ultrasound;Electrical stimulation   OT Goal 1   Goal Identifier STG 1   Goal Description Patient will report increased functional independence during showering from current rating of 3/10 to 7/10 or higher on PSFS.   Target Date 04/12/19   OT Goal 2   Goal Identifier STG 2   Goal Description Patient will report decreased difficulty using a knife to cut food from current rating of Moderate Difficulty to Mild-No Difficulty as measured by QuickDASH.   Target Date 04/12/19   OT Goal 3   Goal Identifier STG 3   Goal Description Patient will increase bilateral  strength by 5 lbs to increase functional use of BUE.   Target Date 04/12/19   OT Goal 4   Goal Identifier LTG 1   Goal Description Patient will increase overall functional independence as measured by scoring 25% impairment or less on the QuickDASH.   Target Date 05/10/19   OT Goal 5   Goal Identifier LTG 2   Goal Description Patient will report increased independence in meal preparation, specifically chopping vegetables from current rating of 4/10 to 7/10 or higher on PSFS.   Target Date 05/10/19   Clinical Impression   Criteria for Skilled Therapeutic Interventions Met Yes, treatment indicated   OT Diagnosis Decreased independence with self-cares, IADLs, and work tasks.   Influenced by the following impairments Decreased balance, decreased vision, decreased strength, decreased activity tolerance, decreased sensation   Assessment of Occupational Performance 3-5 Performance Deficits   Clinical Decision Making (Complexity) Moderate complexity   Therapy Frequency 2x/week   Predicted Duration of Therapy Intervention (days/wks) 8 weeks   Risks and Benefits of Treatment have been explained. Yes   Patient, Family & other staff  in agreement with plan of care Yes   Total Evaluation Time   OT Eval, Moderate Complexity Minutes (22410) 45

## 2019-03-19 NOTE — ADDENDUM NOTE
Encounter addended by: Kerline Vazquez OTR on: 3/19/2019 9:02 AM   Actions taken: Sign clinical note

## 2019-03-21 ENCOUNTER — HOSPITAL ENCOUNTER (OUTPATIENT)
Dept: OCCUPATIONAL THERAPY | Facility: OTHER | Age: 49
Setting detail: THERAPIES SERIES
End: 2019-03-21
Attending: PSYCHIATRY & NEUROLOGY
Payer: COMMERCIAL

## 2019-03-21 ENCOUNTER — HOSPITAL ENCOUNTER (OUTPATIENT)
Dept: PHYSICAL THERAPY | Facility: OTHER | Age: 49
Setting detail: THERAPIES SERIES
End: 2019-03-21
Attending: PSYCHIATRY & NEUROLOGY
Payer: COMMERCIAL

## 2019-03-21 PROCEDURE — 97112 NEUROMUSCULAR REEDUCATION: CPT | Mod: GP

## 2019-03-21 PROCEDURE — 97530 THERAPEUTIC ACTIVITIES: CPT | Mod: GO

## 2019-03-21 PROCEDURE — 97110 THERAPEUTIC EXERCISES: CPT | Mod: GO

## 2019-03-21 PROCEDURE — 97110 THERAPEUTIC EXERCISES: CPT | Mod: GP

## 2019-03-27 ENCOUNTER — OFFICE VISIT (OUTPATIENT)
Dept: NEUROLOGY | Facility: CLINIC | Age: 49
End: 2019-03-27
Attending: PSYCHIATRY & NEUROLOGY
Payer: COMMERCIAL

## 2019-03-27 ENCOUNTER — INFUSION THERAPY VISIT (OUTPATIENT)
Dept: INFUSION THERAPY | Facility: CLINIC | Age: 49
End: 2019-03-27
Attending: PSYCHIATRY & NEUROLOGY
Payer: COMMERCIAL

## 2019-03-27 VITALS
BODY MASS INDEX: 33.35 KG/M2 | TEMPERATURE: 98 F | OXYGEN SATURATION: 96 % | RESPIRATION RATE: 16 BRPM | WEIGHT: 176.5 LBS | DIASTOLIC BLOOD PRESSURE: 88 MMHG | SYSTOLIC BLOOD PRESSURE: 149 MMHG | HEART RATE: 72 BPM

## 2019-03-27 VITALS
HEIGHT: 61 IN | OXYGEN SATURATION: 95 % | DIASTOLIC BLOOD PRESSURE: 81 MMHG | HEART RATE: 69 BPM | BODY MASS INDEX: 33.35 KG/M2 | SYSTOLIC BLOOD PRESSURE: 143 MMHG

## 2019-03-27 DIAGNOSIS — G37.9 DEMYELINATING DISEASE OF THE SPINAL CORD (H): Primary | ICD-10-CM

## 2019-03-27 DIAGNOSIS — H46.9 OPTIC NEURITIS, RIGHT: ICD-10-CM

## 2019-03-27 PROCEDURE — 25800030 ZZH RX IP 258 OP 636: Mod: ZF | Performed by: PSYCHIATRY & NEUROLOGY

## 2019-03-27 PROCEDURE — G0463 HOSPITAL OUTPT CLINIC VISIT: HCPCS | Mod: ZF

## 2019-03-27 PROCEDURE — 25000128 H RX IP 250 OP 636: Mod: ZF | Performed by: PSYCHIATRY & NEUROLOGY

## 2019-03-27 PROCEDURE — 25000132 ZZH RX MED GY IP 250 OP 250 PS 637: Mod: ZF | Performed by: PSYCHIATRY & NEUROLOGY

## 2019-03-27 PROCEDURE — 96375 TX/PRO/DX INJ NEW DRUG ADDON: CPT

## 2019-03-27 PROCEDURE — 40000556 ZZH STATISTIC PERIPHERAL IV START W US GUIDANCE: Mod: ZF

## 2019-03-27 PROCEDURE — 96413 CHEMO IV INFUSION 1 HR: CPT

## 2019-03-27 PROCEDURE — 96415 CHEMO IV INFUSION ADDL HR: CPT

## 2019-03-27 RX ORDER — METHYLPREDNISOLONE SODIUM SUCCINATE 125 MG/2ML
125 INJECTION, POWDER, LYOPHILIZED, FOR SOLUTION INTRAMUSCULAR; INTRAVENOUS ONCE
Status: COMPLETED | OUTPATIENT
Start: 2019-03-27 | End: 2019-03-27

## 2019-03-27 RX ORDER — DIPHENHYDRAMINE HCL 25 MG
50 CAPSULE ORAL ONCE
Status: COMPLETED | OUTPATIENT
Start: 2019-03-27 | End: 2019-03-27

## 2019-03-27 RX ORDER — ACETAMINOPHEN 325 MG/1
650 TABLET ORAL ONCE
Status: CANCELLED
Start: 2019-03-27

## 2019-03-27 RX ORDER — ACETAMINOPHEN 325 MG/1
650 TABLET ORAL ONCE
Status: COMPLETED | OUTPATIENT
Start: 2019-03-27 | End: 2019-03-27

## 2019-03-27 RX ORDER — METHYLPREDNISOLONE SODIUM SUCCINATE 125 MG/2ML
125 INJECTION, POWDER, LYOPHILIZED, FOR SOLUTION INTRAMUSCULAR; INTRAVENOUS ONCE
Status: CANCELLED | OUTPATIENT
Start: 2019-03-27

## 2019-03-27 RX ORDER — DIPHENHYDRAMINE HCL 25 MG
50 CAPSULE ORAL ONCE
Status: CANCELLED
Start: 2019-03-27

## 2019-03-27 RX ADMIN — METHYLPREDNISOLONE SODIUM SUCCINATE 125 MG: 125 INJECTION, POWDER, FOR SOLUTION INTRAMUSCULAR; INTRAVENOUS at 11:32

## 2019-03-27 RX ADMIN — DIPHENHYDRAMINE HYDROCHLORIDE 50 MG: 25 CAPSULE ORAL at 11:05

## 2019-03-27 RX ADMIN — ACETAMINOPHEN 650 MG: 325 TABLET ORAL at 11:05

## 2019-03-27 RX ADMIN — RITUXIMAB 500 MG: 10 INJECTION, SOLUTION INTRAVENOUS at 11:35

## 2019-03-27 ASSESSMENT — PAIN SCALES - GENERAL: PAINLEVEL: NO PAIN (0)

## 2019-03-27 NOTE — LETTER
3/27/2019       RE: Kerrie Patel  [address]  [place] [address]     Dear Colleague,    Thank you for referring your patient, Kerrie Patel, to the [facility] MULTIPLE SCLEROSIS at [facility]. Please see a copy of my visit note below.    THE [facility] MULTIPLE SCLEROSIS CLINIC  FOLLOW UP VISIT     PRINCIPAL NEUROLOGIC DIAGNOSIS: Optic Neuritis    HISTORY OF ILLNESS:    This is a follow visit for this 48 year old right handed genetic female  With a history of Demyelinating disease. Who was last seen on  2-28.   At that time the patient was recommended to be admitted to the hospital for IV steroids since she was experiencing new L hemiparesis. Since last visit she was admitted to [facility] for a 5 day course of IV solumedrol followed by 2 courses of Rituxamab.    She report improvement on her L eye vision, she can now see the R visual field with the R eye (she can see half of my face). Her L sided weakness is back to 90% normal. She is still doing PT and feels like is working but gets tired if she pushes herself on the same day but she like to challenge her self.     Current Symptoms:  1.  Vision loss bilateral, improving  2.  L sided weakness improved  3.  Fatigue    Current Outpatient Prescriptions:  Current Outpatient Medications   Medication     calcium carbonate (OS-LISA) 1500 (600 Ca) MG tablet     Cholecalciferol 5000 units TABS     diazepam (VALIUM) 5 MG tablet     HYDROcodone-acetaminophen (NORCO) 5-325 MG tablet     pantoprazole (PROTONIX) 40 MG EC tablet     predniSONE (DELTASONE) 20 MG tablet     predniSONE (DELTASONE) 5 MG tablet     sulfamethoxazole-trimethoprim (BACTRIM DS/SEPTRA DS) 800-160 MG tablet     No current facility-administered medications for this visit.         ALLERGIES  Allergies   Allergen Reactions     Amoxicillin Nausea and Vomiting     Lactose      Other reaction(s): GI Bleeding       REVIEW OF  SYSTEMS:    Comprehensive review of systems otherwise was negative, including constitutional, head and neck, cardiovascular, pulmonary, gastrointestinal, endocrine, urologic, reproductive, rheumatic, hematologic, immunologic, dermatologic, and psychiatric.    Nutritional concerns: None  Driving issues: None   Safety concerns regarding living situations and safety at home: None  Risk of falls: None  Pain: None    PHYSICAL EXAM:    Hair, skin, nails, and joints were normal. Neck was supple without Lhermitte's phenomenon.  There was no percussion tenderness over the spine.     The patient was alert and oriented to person, place, and time with normal language, attention and concentration, recent and remote memory, praxis, and intellectual function. Affect was normal. The patient did not appear depressed.    Extraocular movements: Intact without BINU  Facial sensation is normal. Normal strength of the muscles of mastication:   Muscles of facial expression were normal  Hearing was normal. Gag reflex and palatal movements were normal. Sternocleidomastoid and trapezius power were normal. Tongue movements were normal. There was no dysarthria.    Motor Examination:   There was no pronator drift.     Motor    Upper      Right Left   Shoulder Abduction 5 5   Elbow Flexion 5 5   Elbow Extension 5 5   Wrist Extension 5 5   Digit Extension 5 5   Digit Flexion 5 5   APB 5 5   Tone 0 0   Lower       Right Left   Hip Flexion 5 5   Knee Extension 5 5   Knee Flexion 5 5   Foot Dorsiflexion 5 5   Foot Plantar Flexion 5 5   EH 5 5   Toe Flexion 5 5   Tone 0 0         Reflexes:     Reflexes       Right  Left   Biceps 1  1   Triceps 1  1   Brachioradialis 1  1   Patellar  1  1   Achilles 1  1   Babinski down  down       Coordination:     Right Left   RRM Normal Normal   ROSA Normal Normal   FTN Normal Normal   RRM Normal Normal   HKS Normal Normal     Sensory examination:    Light touch:  Intact in all extremities       Coordination and  Gait    Gait Normal   Right Left   Romberg Normal  Heel Normal Normal   Tandem {Normal  Toe Normal Normal       ASSESSMENT:    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    F/U with Dr. Kaur as scheduled     Finally I will follow the patient up in 1 month(s) as long as the patient is doing well. I instructed the patient to call or mychart my office with any concerns or questions.    I spent 45 minutes in this visit, with >50% direct patient time spent counseling about prognosis, treatment options, and coordination of care.     My recommendations will be communicated back to the patient's physician(s) by mail.  Follow-up is expected to be with me.    Lea Barragan MD  Chief, Multiple Sclerosis Division  Department of Neurology  [facility]    (Chart documentation was completed in part with Dragon voice-recognition software. Even though reviewed, some grammatical, spelling, and word errors may remain.)

## 2019-03-27 NOTE — PROGRESS NOTES
Nursing Note  Kerrie Patel [facility]:   Chief Complaint   Patient presents with     Infusion     Rituxan     During today's [facility] appointment, orders from Dr. Barragan were completed.  Frequency: once    Progress note:  Patient identification verified by name and date of birth.  Assessment completed.  Vitals recorded in Doc Flowsheets.  Patient was provided with education regarding infusion and possible side effects.  Patient verbalized understanding.     present during visit today: Not Applicable.    Premedications: administered per order.    Infusion length and rate:  infusion given over approximately 3 hours.  infusion starts at 50 ml/hr, then increased by 30 ml/hr every 30 minutes to final rate of 250 ml/hr (given slower due to patient request).    Labs: were not ordered for this appointment.    Vascular access: peripheral IV was placed by vascular access RN.    Treatment Conditions: RN reviewed the following with patient: Medication hand-out provided to patient, Inform patient if any fever, chills or signs of infection, new symptoms, abdominal pain, heart palpitations, shortness of breath, reaction, weakness, neurological changes, seek medical attention immediately and should not receive infusions. No live virus vaccines prior to or during treatment or up to 6 months post infusion. If the patient has an upcoming procedure or surgery, this should be discussed with the rheumatologist and surgeon or provider.  patient denies fever, chills, signs of infection, recent illness, on antibiotics, productive cough or elevated temperature.    Post Infusion Assessment:  Patient tolerated infusion without incident.   Discharge Plan:   Follow up plan of care with: primary medical doctor.  Discharge instructions were reviewed with patient.  Patient/representative verbalized understanding of discharge instructions and all questions  answered.  [facility] discharged [facility] in stable condition.    Erica Resendez RN    Administrations This Visit     acetaminophen (TYLENOL) tablet 650 mg     Admin Date  03/27/2019 Action  Given Dose  650 mg Route  Oral Administered By  Erica Resendez RN          diphenhydrAMINE (BENADRYL) capsule 50 mg     Admin Date  03/27/2019 Action  Given Dose  50 mg Route  Oral Administered By  Erica Resendez RN          methylPREDNISolone sodium succinate (solu-MEDROL) injection 125 mg     Admin Date  03/27/2019 Action  Given Dose  125 mg Route  Intravenous Administered By  Erica Resendez RN          riTUXimab (RITUXAN) 500 mg in sodium chloride 0.9 % 500 mL non-oncology use     Admin Date  03/27/2019 Action  New Bag Dose  500 mg Route  Intravenous Administered By  Erica Resendez RN                /88   Pulse 72   Temp 98  F (36.7  C) (Oral)   Resp 16   Wt 80.1 kg (176 lb 8 oz)   SpO2 96%   BMI 33.35 kg/m      General assessment for IV and SQ medications    1. Elevated temperature, fever, chills, productive cough or abnormal vital signs, night sweats, coughing up blood or sputum, no appetite or abnormal vital signs, SOB : NO    2. Open wounds or new incisions: NO    3. Recent hospitalization: YES- MD aware    4.  Recent surgeries:  NO    5. Any upcoming surgeries or dental procedures?:NO    6. Any current or recent bouts of illness or infection? On any antibiotics? : NO    7. Any new, sudden or worsening abdominal pain :NO    8. Vaccination within 4 weeks? Patient or someone in the household is scheduled to receive vaccination? No live virus vaccines prior to or during treatment :NO    9. Any nervous system diseases [i.e. multiple sclerosis, Guillain-Littleton, seizures, neurological  Changes]  Ask if on a biologic medication* : NO    10. Pregnant or breast feeding; or plans on pregnancy in the future: NO    11. Signs of worsening depression or  suicidal ideations while taking benlysta:NO    12. New-onset medical symptoms: NO    13.  New cancer diagnosis or on chemotherapy or radiation NO    14.  Evaluate for any sign of active TB [Unexplained weight loss, Loss of appetite, Night sweats, Fever, Fatigue, Chills, Coughing for 3 weeks or longer, Hemoptysis (coughing up blood), Chest pain]: NO        **Note: If answered yes to any of the above, hold the infusion and contact ordering provider.

## 2019-03-27 NOTE — PROGRESS NOTES
THE [facility] MULTIPLE SCLEROSIS CLINIC  FOLLOW UP VISIT           PRINCIPAL NEUROLOGIC DIAGNOSIS: Optic Neuritis        HISTORY OF ILLNESS:    This is a follow visit for this 48 year old right handed genetic female  With a history of Demyelinating disease. Who was last seen on  2-28.   At that time the patient was recommended to be admitted to the hospital for IV steroids since she was experiencing new L hemiparesis. Since last visit she was admitted to [facility] for a 5 day course of IV solumedrol followed by 2 courses of Rituxamab.    She report improvement on her L eye vision, she can now see the R visual field with the R eye (she can see half of my face). Her L sided weakness is back to 90% normal. She is still doing PT and feels like is working but gets tired if she pushes herself on the same day but she like to challenge her self.     Current Symptoms:  1.  Vision loss bilateral, improving  2.  L sided weakness improved  3.  Fatigue      Current Outpatient Prescriptions:  Current Outpatient Medications   Medication     calcium carbonate (OS-LISA) 1500 (600 Ca) MG tablet     Cholecalciferol 5000 units TABS     diazepam (VALIUM) 5 MG tablet     HYDROcodone-acetaminophen (NORCO) 5-325 MG tablet     pantoprazole (PROTONIX) 40 MG EC tablet     predniSONE (DELTASONE) 20 MG tablet     predniSONE (DELTASONE) 5 MG tablet     sulfamethoxazole-trimethoprim (BACTRIM DS/SEPTRA DS) 800-160 MG tablet     No current facility-administered medications for this visit.           ALLERGIES       Allergies   Allergen Reactions     Amoxicillin Nausea and Vomiting     Lactose      Other reaction(s): GI Bleeding           REVIEW OF SYSTEMS:    Comprehensive review of systems otherwise was negative, including constitutional, head and neck, cardiovascular, pulmonary, gastrointestinal, endocrine, urologic, reproductive, rheumatic, hematologic, immunologic, dermatologic, and psychiatric.    Nutritional  concerns: None  Driving issues: None   Safety concerns regarding living situations and safety at home: None  Risk of falls: None  Pain: None    PHYSICAL EXAM:    Hair, skin, nails, and joints were normal. Neck was supple without Lhermitte's phenomenon.  There was no percussion tenderness over the spine.     The patient was alert and oriented to person, place, and time with normal language, attention and concentration, recent and remote memory, praxis, and intellectual function. Affect was normal. The patient did not appear depressed.    Extraocular movements: Intact without BINU  Facial sensation is normal. Normal strength of the muscles of mastication:   Muscles of facial expression were normal  Hearing was normal. Gag reflex and palatal movements were normal. Sternocleidomastoid and trapezius power were normal. Tongue movements were normal. There was no dysarthria.    Motor Examination:   There was no pronator drift.       Motor    Upper      Right Left   Shoulder Abduction 5 5   Elbow Flexion 5 5   Elbow Extension 5 5   Wrist Extension 5 5   Digit Extension 5 5   Digit Flexion 5 5   APB 5 5   Tone 0 0   Lower       Right Left   Hip Flexion 5 5   Knee Extension 5 5   Knee Flexion 5 5   Foot Dorsiflexion 5 5   Foot Plantar Flexion 5 5   EH 5 5   Toe Flexion 5 5   Tone 0 0               Reflexes:     Reflexes       Right  Left   Biceps 1  1   Triceps 1  1   Brachioradialis 1  1   Patellar  1  1   Achilles 1  1   Babinski down  down         Coordination:     Right Left   RRM Normal Normal   ROSA Normal Normal   FTN Normal Normal   RRM Normal Normal   HKS Normal Normal         Sensory examination:    Light touch:  Intact in all extremities      Coordination and Gait        Gait Normal   Right Left   Romberg Normal  Heel Normal Normal   Tandem {Normal  Toe Normal Normal         ASSESSMENT:    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    F/U with Dr. Kaur as scheduled     Finally I will follow the patient up in 1 month(s) as long as the patient is doing well. I instructed the patient to call or mychart my office with any concerns or questions.    I spent 45 minutes in this visit, with >50% direct patient time spent counseling about prognosis, treatment options, and coordination of care.     My recommendations will be communicated back to the patient's physician(s) by mail.  Follow-up is expected to be with me.      Lea Barragan MD  Chief, Multiple Sclerosis Division  Department of Neurology  [facility] [place]      (Chart documentation was completed in part with Dragon voice-recognition software. Even though reviewed, some grammatical, spelling, and word errors may remain.)

## 2019-03-27 NOTE — PATIENT INSTRUCTIONS
Dear Kerrie Patel    Thank you for choosing [facility] ([facility]) for your infusion.  The following information is a summary of our appointment as well as important reminders.          We look forward in seeing you on your next appointment here at [facility].  Please don t hesitate to call us at [phone] to reschedule any of your appointments or to speak with one of the [facility] registered nurses.  It was a pleasure taking care of you today.    Sincerely,    [facility]  [facility]  [address] [place] [address]  [address]  Phone:  [phone]  Patient Education     Rituximab Solution for injection  What is this medicine?  RITUXIMAB (ri TUX i mab) is a monoclonal antibody. This medicine changes the way the body's immune system works. It is used commonly to treat non-Hodgkin lymphoma and other conditions. In cancer cells, this drug targets a specific protein within cancer cells and stops the cancer cells from growing. It is also used to treat rheumatoid arthritis (RA). In RA, this medicine slows the inflammatory process and help reduce joint pain and swelling. This medicine is often used with other cancer or arthritis medications.  This medicine may be used for other purposes; ask your health care provider or pharmacist if you have questions.  What should I tell my health care provider before I take this medicine?  They need to know if you have any of these conditions:    blood disorders    heart disease    history of hepatitis B    infection (especially a virus infection such as chickenpox, cold sores, or herpes)    irregular heartbeat    kidney disease    lung or breathing disease, like asthma    lupus    an unusual or allergic reaction to rituximab, mouse proteins, other medicines, foods, dyes, or preservatives    pregnant or trying to get pregnant    breast-feeding  How should I use this  medicine?  This medicine is for infusion into a vein. It is administered in a hospital or clinic by a specially trained health care professional.  A special MedGuide will be given to you by the pharmacist with each prescription and refill. Be sure to read this information carefully each time.  Talk to your pediatrician regarding the use of this medicine in children. This medicine is not approved for use in children.  Overdosage: If you think you have taken too much of this medicine contact a poison control center or emergency room at once.  NOTE: This medicine is only for you. Do not share this medicine with others.  What if I miss a dose?  It is important not to miss a dose. Call your doctor or health care professional if you are unable to keep an appointment.  What may interact with this medicine?    cisplatin    medicines for blood pressure    some other medicines for arthritis    vaccines  This list may not describe all possible interactions. Give your health care provider a list of all the medicines, herbs, non-prescription drugs, or dietary supplements you use. Also tell them if you smoke, drink alcohol, or use illegal drugs. Some items may interact with your medicine.  What should I watch for while using this medicine?  Report any side effects that you notice during your treatment right away, such as changes in your breathing, fever, chills, dizziness or lightheadedness. These effects are more common with the first dose.  Visit your prescriber or health care professional for checks on your progress. You will need to have regular blood work. Report any other side effects. The side effects of this medicine can continue after you finish your treatment. Continue your course of treatment even though you feel ill unless your doctor tells you to stop.  Call your doctor or health care professional for advice if you get a fever, chills or sore throat, or other symptoms of a cold or flu. Do not treat yourself. This  drug decreases your body's ability to fight infections. Try to avoid being around people who are sick.  This medicine may increase your risk to bruise or bleed. Call your doctor or health care professional if you notice any unusual bleeding.  Be careful brushing and flossing your teeth or using a toothpick because you may get an infection or bleed more easily. If you have any dental work done, tell your dentist you are receiving this medicine.  Avoid taking products that contain aspirin, acetaminophen, ibuprofen, naproxen, or ketoprofen unless instructed by your doctor. These medicines may hide a fever.  Do not become pregnant while taking this medicine. Women should inform their doctor if they wish to become pregnant or think they might be pregnant. There is a potential for serious side effects to an unborn child. Talk to your health care professional or pharmacist for more information. Do not breast-feed an infant while taking this medicine.  What side effects may I notice from receiving this medicine?  Side effects that you should report to your doctor or health care professional as soon as possible:    allergic reactions like skin rash, itching or hives, swelling of the face, lips, or tongue    low blood counts - this medicine may decrease the number of white blood cells, red blood cells and platelets. You may be at increased risk for infections and bleeding.    signs of infection - fever or chills, cough, sore throat, pain or difficulty passing urine    signs of decreased platelets or bleeding - bruising, pinpoint red spots on the skin, black, tarry stools, blood in the urine    signs of decreased red blood cells - unusually weak or tired, fainting spells, lightheadedness    breathing problems    confused, not responsive    chest pain    fast, irregular heartbeat    feeling faint or lightheaded, falls    mouth sores    redness, blistering, peeling or loosening of the skin, including inside the mouth    stomach  pain    swelling of the ankles, feet, or hands    trouble passing urine or change in the amount of urine  Side effects that usually do not require medical attention (report to your doctor or other health care professional if they continue or are bothersome):    anxiety    headache    loss of appetite    muscle aches    nausea    night sweats  This list may not describe all possible side effects. Call your doctor for medical advice about side effects. You may report side effects to FDA at 8-637-AVQ-8508.  Where should I keep my medicine?  This drug is given in a hospital or clinic and will not be stored at home.  NOTE:This sheet is a summary. It may not cover all possible information. If you have questions about this medicine, talk to your doctor, pharmacist, or health care provider. Copyright  2016 Gold Standard

## 2019-03-28 ENCOUNTER — TELEPHONE (OUTPATIENT)
Dept: FAMILY MEDICINE | Facility: OTHER | Age: 49
End: 2019-03-28

## 2019-03-28 DIAGNOSIS — H53.9 CHANGE IN VISION: Primary | ICD-10-CM

## 2019-03-28 NOTE — TELEPHONE ENCOUNTER
Coming in for an eye issue, was seen in [place] and needs to have a referral for the the visits she has had there. Inpatient and outpatient visits  Infusions were done at [facility],

## 2019-03-29 ENCOUNTER — HOSPITAL ENCOUNTER (OUTPATIENT)
Dept: OCCUPATIONAL THERAPY | Facility: OTHER | Age: 49
Setting detail: THERAPIES SERIES
End: 2019-03-29
Attending: PSYCHIATRY & NEUROLOGY
Payer: COMMERCIAL

## 2019-03-29 ENCOUNTER — HOSPITAL ENCOUNTER (OUTPATIENT)
Dept: PHYSICAL THERAPY | Facility: OTHER | Age: 49
Setting detail: THERAPIES SERIES
End: 2019-03-29
Attending: PSYCHIATRY & NEUROLOGY
Payer: COMMERCIAL

## 2019-03-29 PROCEDURE — 97530 THERAPEUTIC ACTIVITIES: CPT | Mod: GO

## 2019-03-29 PROCEDURE — 97110 THERAPEUTIC EXERCISES: CPT | Mod: GP

## 2019-03-29 PROCEDURE — 97112 NEUROMUSCULAR REEDUCATION: CPT | Mod: GP

## 2019-03-29 PROCEDURE — 97110 THERAPEUTIC EXERCISES: CPT | Mod: GO

## 2019-04-01 ENCOUNTER — HOSPITAL ENCOUNTER (OUTPATIENT)
Dept: OCCUPATIONAL THERAPY | Facility: OTHER | Age: 49
Setting detail: THERAPIES SERIES
End: 2019-04-01
Attending: PSYCHIATRY & NEUROLOGY
Payer: COMMERCIAL

## 2019-04-01 ENCOUNTER — HOSPITAL ENCOUNTER (OUTPATIENT)
Dept: PHYSICAL THERAPY | Facility: OTHER | Age: 49
Setting detail: THERAPIES SERIES
End: 2019-04-01
Attending: PSYCHIATRY & NEUROLOGY
Payer: COMMERCIAL

## 2019-04-01 PROCEDURE — 97110 THERAPEUTIC EXERCISES: CPT | Mod: GP

## 2019-04-01 PROCEDURE — 97112 NEUROMUSCULAR REEDUCATION: CPT | Mod: GP

## 2019-04-01 PROCEDURE — 97110 THERAPEUTIC EXERCISES: CPT | Mod: GO

## 2019-04-02 ENCOUNTER — TELEPHONE (OUTPATIENT)
Dept: FAMILY MEDICINE | Facility: OTHER | Age: 49
End: 2019-04-02

## 2019-04-02 NOTE — TELEPHONE ENCOUNTER
Patients  was in stating they need a [place]. He would also like a referra placed for [facility] for blood foresis for 02/01/19 and 02/04/19. Please call patient and he will explain.     Maru Larose on 4/2/2019 at 3:33 PM

## 2019-04-03 NOTE — TELEPHONE ENCOUNTER
I did speak with patient's  and he states that a referral is needed as insurance will not cover it without one. Did explain that the ordering MD should do this referral. He will call him and see if he will put referrals in. Did explain that Coby Higgins is out of office till next week.  Obdulia Carranza ....................  4/3/2019   9:19 AM

## 2019-04-04 ENCOUNTER — HOSPITAL ENCOUNTER (OUTPATIENT)
Dept: PHYSICAL THERAPY | Facility: OTHER | Age: 49
Setting detail: THERAPIES SERIES
End: 2019-04-04
Attending: PSYCHIATRY & NEUROLOGY
Payer: COMMERCIAL

## 2019-04-04 ENCOUNTER — HOSPITAL ENCOUNTER (OUTPATIENT)
Dept: OCCUPATIONAL THERAPY | Facility: OTHER | Age: 49
Setting detail: THERAPIES SERIES
End: 2019-04-04
Attending: PSYCHIATRY & NEUROLOGY
Payer: COMMERCIAL

## 2019-04-04 PROCEDURE — 97112 NEUROMUSCULAR REEDUCATION: CPT | Mod: GP

## 2019-04-04 PROCEDURE — 97530 THERAPEUTIC ACTIVITIES: CPT | Mod: GO

## 2019-04-04 PROCEDURE — 97110 THERAPEUTIC EXERCISES: CPT | Mod: GP

## 2019-04-04 PROCEDURE — 97110 THERAPEUTIC EXERCISES: CPT | Mod: GO

## 2019-04-04 NOTE — TELEPHONE ENCOUNTER
Coby:     After speaking with Ruperto, my understanding is the following.     The patient has BCBS-DNR and the 's insurance is [employer]-Federal set as a secondary for patient.     The patient went to [facility] and was in the hospital getting Plasmapheresis treatments on 27, 28, and 29 March. They released her to go home instead of staying in the hospital waiting for the follow on treatments, they thought giving her body a rest was necessary. She went back for another Plasmapheresis treatment on Monday, 1 April and Thursday 4 April.     According to the , these follow up treatments are in question because they weren't continuous care as an in-patient treatment; instead they were considered out-patient. They were continuation of the same care she was receiving the previous week.    The insurance company stated that the patient's PCP has to put in the pre-authorizations for these treatments completed on April 1 & 4 as well as her Catheter removal procedure by the radiologist.     The  has made numerous calls on behalf of these pre-authorizations because the patient agreed to go home instead of stay for an extra week in the hospital for the 2 additional treatments and catheter removal.    Hope this helps clarify what we need to do to move forward to help this patient.   Thank you,  Raul Sharp LPN........................4/4/2019  2:15 PM

## 2019-04-04 NOTE — TELEPHONE ENCOUNTER
Coby:     I just spoke with Abigail at Dr. France's Optometry office at [phone]. She explained to me the need for a referral for the care this patient received on 1/17/2019.     The patient's policy through [employer]-D[employer] number is [id number] Policy Group: [id number]; this insurance requires a referral and pre-authorization for an office visit.     Dx: Retrobulbar Neuritis, Right Eye  ICD-10: H46.11  2 CPT Codes: 37053, 75067    Hopefully, this information helps to complete the referral.    Thank you,  Raul Sharp LPN........................4/4/2019  2:48 PM

## 2019-04-04 NOTE — TELEPHONE ENCOUNTER
vey    Can you please call this patient or her  about all the referrals needed and backdated for her insurance    I did fill out extensive work leave excuse forms in the office--have not seen her since she went to [facility] neurology      I will send referral for optometry to provider of choice for I presume eye exam and vision assessment if needed    Our scheduling staff--Jyoti Mahoney would know how this is sent--as we do not have opthalmology here    Thanks    Coby Higgins, APRN CNP   April 4, 2019

## 2019-04-04 NOTE — TELEPHONE ENCOUNTER
Raul    In reviewing this note--I think Dr Neri [facility] neurology would be the ordering provider for the inpatient and phoresis therapy for the optic neuritis    Patient should be able to call their office and staff should be able to address the referrals needed to support the therapies in question  This should come from Neurologist     Thanks  Coby Higgins, APRN CNP   April 4, 2019

## 2019-04-05 NOTE — TELEPHONE ENCOUNTER
Spoke with Ольга Maldonado DAG pre-authorizations    She will contact patient and  and work on necessary preauthorization's and referrals  And I will sign and support anything that is necessary for her to receive the care that is and has been essential    Coby Higgins, APRN CNP   April 5, 2019

## 2019-04-09 ENCOUNTER — HOSPITAL ENCOUNTER (OUTPATIENT)
Dept: OCCUPATIONAL THERAPY | Facility: OTHER | Age: 49
Setting detail: THERAPIES SERIES
End: 2019-04-09
Attending: PSYCHIATRY & NEUROLOGY
Payer: COMMERCIAL

## 2019-04-09 ENCOUNTER — TELEPHONE (OUTPATIENT)
Dept: FAMILY MEDICINE | Facility: OTHER | Age: 49
End: 2019-04-09

## 2019-04-09 PROCEDURE — 97110 THERAPEUTIC EXERCISES: CPT | Mod: GO

## 2019-04-09 PROCEDURE — 97530 THERAPEUTIC ACTIVITIES: CPT | Mod: GO

## 2019-04-10 ENCOUNTER — HOSPITAL ENCOUNTER (OUTPATIENT)
Dept: PHYSICAL THERAPY | Facility: OTHER | Age: 49
Setting detail: THERAPIES SERIES
End: 2019-04-10
Attending: PSYCHIATRY & NEUROLOGY
Payer: COMMERCIAL

## 2019-04-10 PROCEDURE — 97112 NEUROMUSCULAR REEDUCATION: CPT | Mod: GP

## 2019-04-10 PROCEDURE — 97110 THERAPEUTIC EXERCISES: CPT | Mod: GP

## 2019-04-10 NOTE — TELEPHONE ENCOUNTER
Spoke with patient's  and he states that Coby called him yesterday to discuss this.  Obdulia Carranza ....................  4/10/2019   8:26 AM

## 2019-04-15 ENCOUNTER — HOSPITAL ENCOUNTER (OUTPATIENT)
Dept: PHYSICAL THERAPY | Facility: OTHER | Age: 49
Setting detail: THERAPIES SERIES
End: 2019-04-15
Attending: PSYCHIATRY & NEUROLOGY
Payer: COMMERCIAL

## 2019-04-15 ENCOUNTER — HOSPITAL ENCOUNTER (OUTPATIENT)
Dept: OCCUPATIONAL THERAPY | Facility: OTHER | Age: 49
Setting detail: THERAPIES SERIES
End: 2019-04-15
Attending: PSYCHIATRY & NEUROLOGY
Payer: COMMERCIAL

## 2019-04-15 PROCEDURE — 97110 THERAPEUTIC EXERCISES: CPT | Mod: GO

## 2019-04-15 PROCEDURE — 97110 THERAPEUTIC EXERCISES: CPT | Mod: GP

## 2019-04-15 PROCEDURE — 97530 THERAPEUTIC ACTIVITIES: CPT | Mod: GO

## 2019-04-15 PROCEDURE — 97112 NEUROMUSCULAR REEDUCATION: CPT | Mod: GP

## 2019-04-16 NOTE — PROGRESS NOTES
Outpatient Occupational Therapy Progress Note     Patient: Kerrie Patel  : 1970    Beginning/End Dates of Reporting Period:  2019 to 2019    Referring Provider: Dr. Dali Diaz    Therapy Diagnosis: Decreased independence with self-cares, IADLs, and work tasks.    Client Self Report: Patient reports she has been pushing herself to increase her endurance.  She states she has noticed that she fatigues quickly but that she is able to more most days.  She has a follow-up appointment with her neuro team on .  She will be attempting half days at work next Monday-Wednesday with accommodations from her boss.      Objective Measurements:    Pain Measure   4/15: No pain today and no numbness.       Strength (pounds)   3/15/19 4/16/19   Right 26 44   Left 21 43       Goals:     Goal Identifier STG 1   Goal Description Patient will report increased functional independence during showering from current rating of 3/10 to 7/10 or higher on PSFS.(Goal Met- 4/15: Patient reports 10/10)   Target Date 19   Date Met   04/15/19   Progress: Goal Met- discontinue.  Patient reports that she has returned to baseline with functional independence for showering.      Goal Identifier STG 2   Goal Description Patient will report decreased difficulty using a knife to cut food from current rating of Moderate Difficulty to Mild-No Difficulty as measured by QuickDASH.(Goal Met- 4/15: Mild difficulty)   Target Date 19   Date Met   04/15/19   Progress: Goal Met- discontinue.  Patient reports mild difficulty cutting food with knife.  She reports difficulty is more from decreased depth perception than BUE dysfunction.      Goal Identifier STG 3   Goal Description Patient will increase bilateral  strength by 5 lbs to increase functional use of BUE.   Target Date 19   Date Met   04/15/19   Progress: Goal Met- discontinue.  She has increased bilateral  strength by 18 lbs on RUE and 22  lbs on LUE.      Goal Identifier LTG 1   Goal Description Patient will increase overall functional independence as measured by scoring 25% impairment or less on the QuickDASH.   Target Date 05/10/19   Date Met      Progress: Not yet re-measured- continue goal.      Goal Identifier LTG 2   Goal Description Patient will report increased independence in meal preparation, specifically chopping vegetables from current rating of 4/10 to 7/10 or higher on PSFS.   Target Date 05/10/19   Date Met      Progress: Not yet re-measured- continue goal.      Goal Identifier LTG 3   Goal Description Patient will report return to work without increased pain or decreased functional abilities for completing household tasks.     Target Date 05/14/19   Date Met      Progress: New goal.      Goal Identifier LTG 4   Goal Description Patient will complete informal community mobility assessment in order to make safe determination about attempting to return to driving.    Target Date 05/14/19   Date Met      Progress: New goal.      Progress Toward Goals:   Progress this reporting period: Patient has made excellent progress towards occupational therapy goals this reporting period.  She has increased her overall strength and endurance.  She has increased her functional independence with self-cares and household tasks.  She would like to continue working towards return to work tasks and return to driving.  Updated goals accordingly and will continue with POC.     Plan:  Continue therapy per current plan of care.  Changes to goals: Discontinued 3 goals and added 2 new goals.     Discharge:  No

## 2019-04-17 ENCOUNTER — HOSPITAL ENCOUNTER (OUTPATIENT)
Dept: OCCUPATIONAL THERAPY | Facility: OTHER | Age: 49
Setting detail: THERAPIES SERIES
End: 2019-04-17
Attending: PSYCHIATRY & NEUROLOGY
Payer: COMMERCIAL

## 2019-04-17 ENCOUNTER — HOSPITAL ENCOUNTER (OUTPATIENT)
Dept: PHYSICAL THERAPY | Facility: OTHER | Age: 49
Setting detail: THERAPIES SERIES
End: 2019-04-17
Attending: PSYCHIATRY & NEUROLOGY
Payer: COMMERCIAL

## 2019-04-17 PROCEDURE — 97530 THERAPEUTIC ACTIVITIES: CPT | Mod: GO

## 2019-04-17 PROCEDURE — 97110 THERAPEUTIC EXERCISES: CPT | Mod: GO

## 2019-04-17 PROCEDURE — 97112 NEUROMUSCULAR REEDUCATION: CPT | Mod: GP

## 2019-04-17 PROCEDURE — 97110 THERAPEUTIC EXERCISES: CPT | Mod: GP

## 2019-04-25 ENCOUNTER — OFFICE VISIT (OUTPATIENT)
Dept: NEUROLOGY | Facility: CLINIC | Age: 49
End: 2019-04-25
Attending: PSYCHIATRY & NEUROLOGY
Payer: COMMERCIAL

## 2019-04-25 ENCOUNTER — OFFICE VISIT (OUTPATIENT)
Dept: OPHTHALMOLOGY | Facility: CLINIC | Age: 49
End: 2019-04-25
Attending: OPHTHALMOLOGY
Payer: COMMERCIAL

## 2019-04-25 VITALS
WEIGHT: 181.2 LBS | BODY MASS INDEX: 34.21 KG/M2 | SYSTOLIC BLOOD PRESSURE: 164 MMHG | HEART RATE: 73 BPM | HEIGHT: 61 IN | DIASTOLIC BLOOD PRESSURE: 104 MMHG

## 2019-04-25 DIAGNOSIS — H46.9 OPTIC NEUROPATHY: ICD-10-CM

## 2019-04-25 DIAGNOSIS — G37.9 DEMYELINATING DISEASE OF THE SPINAL CORD (H): ICD-10-CM

## 2019-04-25 DIAGNOSIS — H05.10 IDIOPATHIC ORBITAL INFLAMMATORY SYNDROME: ICD-10-CM

## 2019-04-25 DIAGNOSIS — H46.9 OPTIC NEUROPATHY: Primary | ICD-10-CM

## 2019-04-25 DIAGNOSIS — H46.9 OPTIC NEURITIS, RIGHT: Primary | ICD-10-CM

## 2019-04-25 DIAGNOSIS — G81.94 LEFT HEMIPARESIS (H): ICD-10-CM

## 2019-04-25 DIAGNOSIS — H53.10 SUBJECTIVE VISUAL DISTURBANCE: ICD-10-CM

## 2019-04-25 PROCEDURE — G0463 HOSPITAL OUTPT CLINIC VISIT: HCPCS | Mod: ZF | Performed by: TECHNICIAN/TECHNOLOGIST

## 2019-04-25 PROCEDURE — 92133 CPTRZD OPH DX IMG PST SGM ON: CPT | Mod: ZF | Performed by: OPHTHALMOLOGY

## 2019-04-25 PROCEDURE — 92083 EXTENDED VISUAL FIELD XM: CPT | Mod: ZF | Performed by: OPHTHALMOLOGY

## 2019-04-25 PROCEDURE — G0463 HOSPITAL OUTPT CLINIC VISIT: HCPCS | Mod: ZF

## 2019-04-25 RX ORDER — SULFAMETHOXAZOLE/TRIMETHOPRIM 800-160 MG
1 TABLET ORAL
Qty: 24 TABLET | Refills: 0 | Status: SHIPPED | OUTPATIENT
Start: 2019-04-26 | End: 2019-05-31

## 2019-04-25 RX ORDER — PREDNISONE 50 MG/1
1250 TABLET ORAL DAILY
Qty: 75 TABLET | Refills: 1 | Status: SHIPPED | OUTPATIENT
Start: 2019-04-25 | End: 2019-05-15

## 2019-04-25 ASSESSMENT — VISUAL ACUITY
OS_CC: 20/20
OD_CC: 20/20
METHOD: SNELLEN - LINEAR

## 2019-04-25 ASSESSMENT — SLIT LAMP EXAM - LIDS
COMMENTS: NORMAL
COMMENTS: NORMAL

## 2019-04-25 ASSESSMENT — REFRACTION_WEARINGRX
OS_AXIS: 077
OS_CYLINDER: +1.50
OD_SPHERE: -7.75
OS_SPHERE: -8.00
OD_CYLINDER: +1.00
OD_AXIS: 100

## 2019-04-25 ASSESSMENT — PAIN SCALES - GENERAL: PAINLEVEL: NO PAIN (0)

## 2019-04-25 ASSESSMENT — MIFFLIN-ST. JEOR: SCORE: 1389.3

## 2019-04-25 ASSESSMENT — EXTERNAL EXAM - LEFT EYE: OS_EXAM: NORMAL

## 2019-04-25 ASSESSMENT — TONOMETRY
IOP_METHOD: ICARE
OD_IOP_MMHG: 19
OS_IOP_MMHG: 20

## 2019-04-25 ASSESSMENT — CUP TO DISC RATIO
OS_RATIO: 0.4
OD_RATIO: 0.2

## 2019-04-25 ASSESSMENT — CONF VISUAL FIELD: COMMENTS: GTOP

## 2019-04-25 ASSESSMENT — EXTERNAL EXAM - RIGHT EYE: OD_EXAM: NORMAL

## 2019-04-25 NOTE — LETTER
4/25/2019       RE: Kerrie Patel  [id number]  [place] [address]     Dear Colleague,    Thank you for referring your patient, Kerrie Patel, to the [facility] MULTIPLE SCLEROSIS at [facility]. Please see a copy of my visit note below.    THE [facility] MULTIPLE SCLEROSIS CLINIC  FOLLOW UP VISIT     PRINCIPAL NEUROLOGIC DIAGNOSIS: Optic Neuritis    HISTORY OF ILLNESS:    This is a follow visit for this 49 year old right handed genetic female  With a history of ON. Who was last seen on  3-27.   At that time the patient was recommended to return in 1 month and f/u with Dr. Ashok Deleon at the [place] in [place]. Since last visit her R vision has improved some and she only sees a film like in 1/3 of my R face when covering her L eye. She went back to work 4 hours a day and feels well but may have to do go back full time in June 30th as per HR. The move to [facility] (8 hours from [place]). Serovital and Asea Redox are 2 supplements one she can continue and the Asea she should discontinue because it stimulates the immune system. She is wondering if she should continue the bactrim and the prednisone and she can continue L side weakness and balance.    Current Symptoms:  1. fatigue  2. R eye decreased vision  3. Decreased depth perception    Current Outpatient Prescriptions:  Current Outpatient Medications   Medication     calcium carbonate (OS-LISA) 1500 (600 Ca) MG tablet     Cholecalciferol 5000 units TABS     diazepam (VALIUM) 5 MG tablet     HYDROcodone-acetaminophen (NORCO) 5-325 MG tablet     pantoprazole (PROTONIX) 40 MG EC tablet     predniSONE (DELTASONE) 5 MG tablet     sulfamethoxazole-trimethoprim (BACTRIM DS/SEPTRA DS) 800-160 MG tablet     predniSONE (DELTASONE) 20 MG tablet     No current facility-administered medications for this visit.         ALLERGIES    Allergies   Allergen Reactions     Amoxicillin Nausea and Vomiting      Lactose      Other reaction(s): GI Bleeding         REVIEW OF SYSTEMS:    Comprehensive review of systems otherwise was negative, including constitutional, head and neck, cardiovascular, pulmonary, gastrointestinal, endocrine, urologic, reproductive, rheumatic, hematologic, immunologic, dermatologic, and psychiatric.    Nutritional concerns: None  Driving issues: None   Safety concerns regarding living situations and safety at home: None  Risk of falls: None  Pain: None    PHYSICAL EXAM:    Hair, skin, nails, and joints were normal. Neck was supple without Lhermitte's phenomenon.  There was no percussion tenderness over the spine.     The patient was alert and oriented to person, place, and time with normal language, attention and concentration, recent and remote memory, praxis, and intellectual function. Affect was normal. The patient did not appear depressed.    Visual acuity: Deferred for Dr. Kaur to evaluate later today    Visual fields were full to confrontation.   Pupils were 4 mm and briskly reactive OU with a MILD relative afferent pupillary defect OD.  Funduscopic examination was normal without disc edema, erythema, or atrophy.  Extraocular movements: Intact without BINU  Facial sensation is normal. Normal strength of the muscles of mastication:   Muscles of facial expression were normal  Hearing was normal. Gag reflex and palatal movements were normal. Sternocleidomastoid and trapezius power were normal. Tongue movements were normal. There was no dysarthria.    Motor Examination:   There was no pronator drift.       Motor    Upper      Right Left   Shoulder Abduction 5 5   Elbow Flexion 5 5   Elbow Extension 5 5   Wrist Extension 5 5   Digit Extension 5 5   Digit Flexion 5 5   APB 5 5   Tone 0 0   Lower       Right Left   Hip Flexion 5 5   Knee Extension 5 5   Knee Flexion 5 5   Foot Dorsiflexion 5 5   Foot Plantar Flexion 5 5   EH 5 5   Toe Flexion 5 5   Tone 0 0         Reflexes:     Reflexes       Right   Left   Biceps 1  1   Triceps 1  1   Brachioradialis 1  1   Patellar  1  1   Achilles 1  1   Babinski down  down       Coordination:     Right Left   RRM Normal Normal   ROSA Normal Normal   FTN Normal Normal   RRM Normal Normal   HKS Normal Normal       Sensory examination:    Light touch:  Intact in all extremities      Coordination and Gait    Gait Normal   Right Left   Romberg Normal  Heel Normal Normal   Tandem {Normal  Toe Normal Normal       ASSESSMENT:    Impression the patient is a 49-year-old female with a 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  She has significantly improved since her last visit approximately a month ago and also reports improvement of her left hemiparesis about 95% back to normal, partly due to the intense physical therapy.  She has also undergone eye rehabilitation which she thinks has helped. She is not driving yet due to issues with depth perception. She is still on prednisone 10 mg p.o. twice to once a day, and Bactrim.  She will see Dr. Kaur this afternoon for a complete evaluation of her visual acuity and visual fields.    Today we discussed the need to continue physical therapy to work on balance, depth perception, and strengthening.  She would like to continue to do so at [facility].  At some point when she is told that she should go back to driving.  She will start doing so in places where there is no traffic.  We also discussed the need for her to make an appointment with Dr. Deleon at the [facility] in [place] approximately 1 month before her next rituximab infusion is due which is September.  If she has moved to [facility] by then she will see Dr. Deleon if she is still here in [place] due to her home not selling today she will see me in August so we can schedule her rituximab infusion before September.  She will continue to work part-time until the end of June, at that point I will reevaluate her and make  recommendations regarding going back to full-time versus continuing part-time depending on her vision, ability to drive, and fatigue.    She has been reassured that some of the side effects from the prednisone including moon face and back comp should improve once she comes off of it in the next couple weeks.  She will decrease her daily dose from 10-5 for a couple of weeks and then stop.    PLAN:    Continue bactrim x 1 more month  Prednisone taper from 10 mg every day to 5 mg every day x 2 weeks and stop  Continue PT and vision rehabilitation at [facility]  She will be given a 3 day course of po prednisone 1250 mg po every day x 3 in case she develops acute ON and is unable to be seen by me or Dr. Kaur immediately. Preferably she should call our clinic before starting the medication but if symptoms of optic neuritis (visual loss constant for 24-48 hours unrelated to exertion, infection or external/internal heat such as fever), she should start taking the prednisone as prescribed  I will contact Dr LANDON Deleon in [place] to have him continue care when she moves to [place].    Finally I will follow the patient up in 2 month(s) as long as the patient is doing well. I instructed the patient to call or mychart my office with any concerns or questions.  I spent 45 minutes in this visit, with >50% direct patient time spent counseling about prognosis, treatment options, and coordination of care.     My recommendations will be communicated back to the patient's physician(s) by mail.  Follow-up is expected to be with me.    Lea Barragan MD  Chief, Multiple Sclerosis Division  Department of Neurology  [facility] [place]    (Chart documentation was completed in part with Dragon voice-recognition software. Even though reviewed, some grammatical, spelling, and word errors may remain.)

## 2019-04-25 NOTE — PROGRESS NOTES
THE [facility] MULTIPLE SCLEROSIS CLINIC  FOLLOW UP VISIT           PRINCIPAL NEUROLOGIC DIAGNOSIS: Optic Neuritis        HISTORY OF ILLNESS:    This is a follow visit for this 49 year old right handed genetic female  With a history of ON. Who was last seen on  3-27.   At that time the patient was recommended to return in 1 month and f/u with Dr. Ashok Deleon at the [facility] in [place]. Since last visit her R vision has improved some and she only sees a film like in 1/3 of my R face when covering her L eye. She went back to work 4 hours a day and feels well but may have to do go back full time in June 30th as per HR. The move to [facility] (8 hours from [place]). Serovital and Asea Redox are 2 supplements one she can continue and the Asea she should discontinue because it stimulates the immune system. She is wondering if she should continue the bactrim and the prednisone and she can continue L side weakness and balance.    Current Symptoms:  1. fatigue  2. R eye decreased vision  3. Decreased depth perception      Current Outpatient Prescriptions:  Current Outpatient Medications   Medication     calcium carbonate (OS-LISA) 1500 (600 Ca) MG tablet     Cholecalciferol 5000 units TABS     diazepam (VALIUM) 5 MG tablet     HYDROcodone-acetaminophen (NORCO) 5-325 MG tablet     pantoprazole (PROTONIX) 40 MG EC tablet     predniSONE (DELTASONE) 5 MG tablet     sulfamethoxazole-trimethoprim (BACTRIM DS/SEPTRA DS) 800-160 MG tablet     predniSONE (DELTASONE) 20 MG tablet     No current facility-administered medications for this visit.           ALLERGIES       Allergies   Allergen Reactions     Amoxicillin Nausea and Vomiting     Lactose      Other reaction(s): GI Bleeding           REVIEW OF SYSTEMS:    Comprehensive review of systems otherwise was negative, including constitutional, head and neck, cardiovascular, pulmonary, gastrointestinal, endocrine, urologic, reproductive, rheumatic,  hematologic, immunologic, dermatologic, and psychiatric.    Nutritional concerns: None  Driving issues: None   Safety concerns regarding living situations and safety at home: None  Risk of falls: None  Pain: None    PHYSICAL EXAM:    Hair, skin, nails, and joints were normal. Neck was supple without Lhermitte's phenomenon.  There was no percussion tenderness over the spine.     The patient was alert and oriented to person, place, and time with normal language, attention and concentration, recent and remote memory, praxis, and intellectual function. Affect was normal. The patient did not appear depressed.    Visual acuity: Deferred for Dr. Kaur to evaluate later today    Visual fields were full to confrontation.   Pupils were 4 mm and briskly reactive OU with a MILD relative afferent pupillary defect OD.  Funduscopic examination was normal without disc edema, erythema, or atrophy.  Extraocular movements: Intact without BINU  Facial sensation is normal. Normal strength of the muscles of mastication:   Muscles of facial expression were normal  Hearing was normal. Gag reflex and palatal movements were normal. Sternocleidomastoid and trapezius power were normal. Tongue movements were normal. There was no dysarthria.    Motor Examination:   There was no pronator drift.       Motor    Upper      Right Left   Shoulder Abduction 5 5   Elbow Flexion 5 5   Elbow Extension 5 5   Wrist Extension 5 5   Digit Extension 5 5   Digit Flexion 5 5   APB 5 5   Tone 0 0   Lower       Right Left   Hip Flexion 5 5   Knee Extension 5 5   Knee Flexion 5 5   Foot Dorsiflexion 5 5   Foot Plantar Flexion 5 5   EH 5 5   Toe Flexion 5 5   Tone 0 0               Reflexes:     Reflexes       Right  Left   Biceps 1  1   Triceps 1  1   Brachioradialis 1  1   Patellar  1  1   Achilles 1  1   Babinski down  down         Coordination:     Right Left   RRM Normal Normal   ROSA Normal Normal   FTN Normal Normal   RRM Normal Normal   HKS Normal Normal          Sensory examination:    Light touch:  Intact in all extremities      Coordination and Gait        Gait Normal   Right Left   Romberg Normal  Heel Normal Normal   Tandem {Normal  Toe Normal Normal           ASSESSMENT:    Impression the patient is a 49-year-old female with a 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  She has significantly improved since her last visit approximately a month ago and also reports improvement of her left hemiparesis about 95% back to normal, partly due to the intense physical therapy.  She has also undergone eye rehabilitation which she thinks has helped. She is not driving yet due to issues with depth perception. She is still on prednisone 10 mg p.o. twice to once a day, and Bactrim.  She will see Dr. Kaur this afternoon for a complete evaluation of her visual acuity and visual fields.    Today we discussed the need to continue physical therapy to work on balance, depth perception, and strengthening.  She would like to continue to do so at [facility].  At some point when she is told that she should go back to driving.  She will start doing so in places where there is no traffic.  We also discussed the need for her to make an appointment with Dr. Deleon at the [facility] in [place] approximately 1 month before her next rituximab infusion is due which is September.  If she has moved to [facility] by then she will see Dr. Deleon if she is still here in [place] due to her home not selling today she will see me in August so we can schedule her rituximab infusion before September.  She will continue to work part-time until the end of June, at that point I will reevaluate her and make recommendations regarding going back to full-time versus continuing part-time depending on her vision, ability to drive, and fatigue.    She has been reassured that some of the side effects from the prednisone including moon face and back comp  should improve once she comes off of it in the next couple weeks.  She will decrease her daily dose from 10-5 for a couple of weeks and then stop.    PLAN:    Continue bactrim x 1 more month  Prednisone taper from 10 mg every day to 5 mg every day x 2 weeks and stop  Continue PT and vision rehabilitation at [facility]  She will be given a 3 day course of po prednisone 1250 mg po every day x 3 in case she develops acute ON and is unable to be seen by me or Dr. Kaur immediately. Preferably she should call our clinic before starting the medication but if symptoms of optic neuritis (visual loss constant for 24-48 hours unrelated to exertion, infection or external/internal heat such as fever), she should start taking the prednisone as prescribed  I will contact Dr LANDON Deleon in [place] to have him continue care when she moves to [place].    Finally I will follow the patient up in 2 month(s) as long as the patient is doing well. I instructed the patient to call or mychart my office with any concerns or questions.    I spent 45 minutes in this visit, with >50% direct patient time spent counseling about prognosis, treatment options, and coordination of care.     My recommendations will be communicated back to the patient's physician(s) by mail.  Follow-up is expected to be with me.      Lea Barragan MD  Chief, Multiple Sclerosis Division  Department of Neurology  [facility] [place]      (Chart documentation was completed in part with Dragon voice-recognition software. Even though reviewed, some grammatical, spelling, and word errors may remain.)

## 2019-04-25 NOTE — NURSING NOTE
Chief Complaint(s) and History of Present Illness(es)     Follow Up     In right eye.              Comments     Vision improving in right eye. Patient reports headaches still occurring in the morning. Patient states he wakes up with headaches and they last for a half an hour to an hour.  Throbbing on right frontal and feels right eye is swollen but it is not.     GABRIELLE Ashley 4/25/2019 1:44 PM

## 2019-04-25 NOTE — PROGRESS NOTES
Assessment & Plan     Kerrie Patel is a 49 year old female with the following diagnoses:   1. Optic neuropathy       Follow up MOGopathy.  Last visit was 2/21/19.  At that time she had HM vision RIGHT eye and 20/20 LEFT eye.  She had anterior segment inflammation and 3+ optic disc edema RIGHT eye.  Today, she states that her vision in the right eye has much improved. She continues to be on oral prednisone (10mg per day). She started rituximab (s/p 2 infusions, 3/6/19 3/27/19). She started noticing improvement after her 2nd infusion of rituximab. Her vision has been great over the past 2 weeks. Pain (to move the eyes) completely resolved.     Her visual acuity is 20/20-2 RIGHT eye and 20/20 LEFT eye.  She sees 1/11 color plates RIGHT eye and 11/11 LEFT eye.  She has an afferent pupillary defect RIGHT eye.  Her visual field shows a superior altitudinal defect and inferior arcuate defect RIGHT eye and normal results LEFT eye.  Her optical coherence tomography show interval thinning of the right eye. Left eye normal.     Lab results  Normal: treponema, ANCA, ACE, IgG subclasses, vasculitis panel, NMO, KIM, ESR, B12, CRP, Quant Gold  Abnormal: MOG 1:20 titer     Imaging: MRI thoracic and cervical unremarkable. Per patient, Dr. Barragan saw 2 lesions in her spine that the radiologist did not see.     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 SHe is doing well from the vision standpoint in that she has significantly improved. Her vision is not normal and moderately affected.  I fear that she will remain this way. She should continue f/u with Dr. Barragan regarding her immunosuppression.     The patient complains of photophobia. We discussed FL-41 tint for her glasses may improve symptoms.  Also discussed options such as wearing a hat/visor while at work.      We discussed that patient's intermittent blurred vision is likely due to dry eyes.  I asked the patient to use  artificial tears as well as warm compresses to the eyelid margin  on a regular basis.  I asked the patient to take fish oil capsules 2x/day for a month and then 1x/d thereafter.   If that is not beneficial we could consider punctal plugs, corticosteroid eye drops and Restasis             Attending Physician Attestation:  Complete documentation of historical and exam elements from today's encounter can be found in the full encounter summary report (not reduplicated in this progress note).  I personally obtained the chief complaint(s) and history of present illness.  I confirmed and edited as necessary the review of systems, past medical/surgical history, family history, social history, and examination findings as documented by others; and I examined the patient myself.  I personally reviewed the relevant tests, images, and reports as documented above.  I formulated and edited as necessary the assessment and plan and discussed the findings and management plan with the patient and family. - Javi Mckinley MD  Neuro-ophthalmology Fellow

## 2019-04-25 NOTE — PATIENT INSTRUCTIONS
You are sensitive to the light.  There is generally no known cause for this.  There is a specialized tint called FL-41 that blocks a certain wavelength of light known to cause light sensitivity. Your eyeglass lenses can be tinted with this at just about any eyeglass store but not all stores carry this tint.  You should call before visiting the store.  The store may try to substitute with a tint that they have in stock, but I would recommend against it.  There is also a company that will sell FL-41 tinted lenses online at [employer] or [employer].      Generally speaking, when you are at home, try not to wear sunglasses inside, especially the evenings.  The more you wear them, the less tolerant of light you become.  This does not apply to the FL-41 lenses since they are not that dark.  There are also smart LED light bulbs that can be controlled from your smart phone.  They can be made any color and any brightness.  You may find that there is a particular color and brightness that helps you.      There are tinted contact lenses.  These can be made using the FL-41 tint or another color.  They are very custom made and so they tend to cost hundreds of dollars.      You can also consider wearing a hat, tinting your windshield, get a shield above your cubicle, tinting the windows in your home and change the bulbs in your office.          You have been diagnosed with Dry eye syndrome.  Symptoms of Dry eye syndrome can include double vision, eye pain, blurry vision, stinging, burning, tearing, eye redness.      Some useful instructions are listed below:     1.  Use artificial tears on a regular basis.  If you use artificial tear drops with preservative, you can use them up to 4-6 times per day. If you use artificial tear drops without preservatives, then you can use them more often.      2.  Wash your eyelashes with hot water.  Do not make the water so hot that you burn yourself, but the water should be more than  just warm.  Often patients will wash their eyelashes in the shower under the hot water.  You should rub gently along the base of your eyelashes.      3.  Taking fish oil capsules twice a day for a month and then once a day after that can help improve Dry eye symptoms.      4.  Drink a lot of water.  Hydration can be helpful.      5.  Humidify your environment.      6.  If this is not beneficial, other treatments can include punctal plugs or cautery, corticosteroid eye drops, Restasis, Lipiflow, or autologous serum.  If you are still struggling with dry eye symptoms, call Dr. Kaur's office for other therapies or a referral to a dry eye specialist.           [place]:     Manolo Shetty MD

## 2019-04-29 DIAGNOSIS — H46.9 OPTIC NEURITIS, RIGHT: Primary | ICD-10-CM

## 2019-04-29 DIAGNOSIS — G37.9 DEMYELINATING DISEASE OF CENTRAL NERVOUS SYSTEM (H): ICD-10-CM

## 2019-05-09 ENCOUNTER — TELEPHONE (OUTPATIENT)
Dept: OPHTHALMOLOGY | Facility: CLINIC | Age: 49
End: 2019-05-09

## 2019-05-09 NOTE — TELEPHONE ENCOUNTER
Spoke to pt at 1403  Dr. Mckinley previously review note/symptoms and neurology oking 20 mg prednisone.    Last day of prednisone yesterday  Symptoms started Tuesday   Pain with blinking, pain with movement of eyes, facial pain.  No swelling  No vision changes  A little redness  New photophobia   Throbbing pressure all over face/neck/head-- started today    - pt working 4 hours a day now    Pt reported new headaches about 2 weeks ago also    Pt using tramadol for pain    No artificial tears    [place] out of state currently and will be back Monday    neurology okay'ed to start prednisone to 20 mg daily if ok per ophthalmology (ok per Dr. Mckinley)  Pt to start 20 mg prednisone   Recommended starting frequent use of preservative free artificial tears and may try lubricating eye ointment at night    Pt/ would like to know what to do about pain narcotic medications while out of town/state  Reviewed may call main hospital number for worsening symptoms over weekend (while out of town) and may see ED in [place] if pain strong enough that pt feels needs narcotic.    Pt to call triage line when back in [place] on Monday    Will also forward to faciltiator/Dr. Mckinley to review plan for scheduling f/u appt when back    Ruperto Medina RN 2:27 PM 05/09/19                  Will review plan of care with Elías.  Pt's neurologist out of clinic per message for 3 weeks  H/o optic neuritis/orbital inflammation  H/o Dry eyes  Ruperto Medina RN 11:44 AM 05/09/19           [facility] Center    Phone Message    May a detailed message be left on voicemail: yes    Reason for Call: Other: Pt states that was tapering off of the rx Prednisone prescribed by Yung in Neurology. Pt says that when tapering off, her eyes felt fine. Now that pt is almost off of it, she is experiencing head aches since Monday but the eye pain started Tuesday. Pt says that it hurts to blink, eyes are painful. Pt's last dose of steroid was this Wednesday.     Pt is contacting Vincent's  office because Yung is out of the country for 3 wks. Pt wants to know what Vincent suggest that pt do in this case. Should Pt go back on this steroid or if she should use some pain medication?  Please call pt back.     Pt would like it if Maru could call back asap because  is trying to leave to [place] and Pt doesn't want him to go until this is cleared up.     Action Taken: Message routed to:  [facility] ([facility] eye

## 2019-05-09 NOTE — TELEPHONE ENCOUNTER
Note in other telephone encounter  Ruperto Medina RN 2:02 PM 05/09/19        [facility]    Phone Message    May a detailed message be left on voicemail: yes    Reason for Call: Symptoms or Concerns     If patient has red-flag symptoms, warm transfer to triage line    Current symptom or concern: Pt  is concerned -pt is in increasing pain and no sign of improvement.  They have been waiting for a call back (Dr Mckinley) but really need to talk to someone ASAP.  They have requested the Triage team call them back ASAP to discuss options.        Action Taken: Message routed to:  [facility] ([facility] eye clinic

## 2019-05-15 ENCOUNTER — OFFICE VISIT (OUTPATIENT)
Dept: OPHTHALMOLOGY | Facility: CLINIC | Age: 49
End: 2019-05-15
Attending: STUDENT IN AN ORGANIZED HEALTH CARE EDUCATION/TRAINING PROGRAM
Payer: COMMERCIAL

## 2019-05-15 ENCOUNTER — OFFICE VISIT (OUTPATIENT)
Dept: NEUROLOGY | Facility: CLINIC | Age: 49
End: 2019-05-15
Attending: PSYCHIATRY & NEUROLOGY
Payer: COMMERCIAL

## 2019-05-15 VITALS
HEART RATE: 83 BPM | BODY MASS INDEX: 34.21 KG/M2 | HEIGHT: 61 IN | DIASTOLIC BLOOD PRESSURE: 91 MMHG | WEIGHT: 181.22 LBS | SYSTOLIC BLOOD PRESSURE: 168 MMHG

## 2019-05-15 DIAGNOSIS — H46.9 OPTIC NEURITIS: Primary | ICD-10-CM

## 2019-05-15 DIAGNOSIS — H46.11 OPTIC NEURITIS, RETROBULBAR (ACUTE), RIGHT: ICD-10-CM

## 2019-05-15 DIAGNOSIS — G37.9 DEMYELINATING DISEASE OF THE SPINAL CORD (H): ICD-10-CM

## 2019-05-15 PROCEDURE — G0463 HOSPITAL OUTPT CLINIC VISIT: HCPCS | Mod: ZF

## 2019-05-15 PROCEDURE — G0463 HOSPITAL OUTPT CLINIC VISIT: HCPCS | Mod: ZF,27

## 2019-05-15 RX ORDER — PREDNISONE 10 MG/1
20 TABLET ORAL DAILY
Qty: 60 EACH | Refills: 11 | Status: ON HOLD | OUTPATIENT
Start: 2019-05-19 | End: 2019-05-31

## 2019-05-15 ASSESSMENT — TONOMETRY
OS_IOP_MMHG: 21
IOP_METHOD: ICARE
OD_IOP_MMHG: 22

## 2019-05-15 ASSESSMENT — VISUAL ACUITY
OD_CC+: -1
CORRECTION_TYPE: GLASSES
OS_CC: 20/20
OD_CC: 20/100
METHOD: SNELLEN - LINEAR

## 2019-05-15 ASSESSMENT — EXTERNAL EXAM - LEFT EYE: OS_EXAM: NORMAL

## 2019-05-15 ASSESSMENT — REFRACTION_WEARINGRX
OS_AXIS: 077
SPECS_TYPE: SVL
OS_CYLINDER: +1.50
OD_CYLINDER: +1.00
OD_SPHERE: -7.75
OS_SPHERE: -8.00
OD_AXIS: 100

## 2019-05-15 ASSESSMENT — CUP TO DISC RATIO
OD_RATIO: 0.4
OS_RATIO: 0.4

## 2019-05-15 ASSESSMENT — SLIT LAMP EXAM - LIDS
COMMENTS: NORMAL
COMMENTS: NORMAL

## 2019-05-15 ASSESSMENT — CONF VISUAL FIELD
OD_SUPERIOR_TEMPORAL_RESTRICTION: 1
OD_INFERIOR_TEMPORAL_RESTRICTION: 1
METHOD: COUNTING FINGERS

## 2019-05-15 ASSESSMENT — EXTERNAL EXAM - RIGHT EYE: OD_EXAM: NORMAL

## 2019-05-15 ASSESSMENT — PAIN SCALES - GENERAL: PAINLEVEL: MODERATE PAIN (4)

## 2019-05-15 ASSESSMENT — MIFFLIN-ST. JEOR: SCORE: 1389.38

## 2019-05-15 NOTE — PROGRESS NOTES
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 Who was last seen on  4-25.   At that time the patient was recommended to taper prednisone off. Since last visit she was doing well until last Tuesday when she developed a headache similar to the original episode of ON and she called the clinic to ask for advise since her last dose of 10 mg of prednisone was the next day. On Wednesday she got a call back from Ana jacobson RN who said she will communicate with Dr. Larose. On Thursday morning she heard back from the clinic not to take the prescribed 25 pills of prednisone (50 mg each) I had prescribed in case of a relapse, she was told to start the 20 mg every day and the eye drops that were recommended by Dr Kaur. On Thursday evening she developed blurry vision in the R eye but her headache got better with the increase on the prednisone. She did not report the new visual symptoms to neurology or ophthalmology and thinks they are getting worse. She also thinks her L sided weakness is back.    She was counseled extensively on the importance of reporting visual changes immediately.    Current Symptoms:  1. R eye blurriness  2. l eye pain (mild)  3. L sided weaknees        Current Outpatient Prescriptions:  Current Outpatient Medications   Medication     calcium carbonate (OS-LISA) 1500 (600 Ca) MG tablet     Cholecalciferol 5000 units TABS     HYDROcodone-acetaminophen (NORCO) 5-325 MG tablet     PREDNISONE PO     sulfamethoxazole-trimethoprim (BACTRIM DS/SEPTRA DS) 800-160 MG tablet     pantoprazole (PROTONIX) 40 MG EC tablet     No current facility-administered medications for this visit.           ALLERGIES       Allergies   Allergen Reactions     Amoxicillin Nausea and Vomiting     Lactose      Other  reaction(s): GI Bleeding           REVIEW OF SYSTEMS:    Comprehensive review of systems otherwise was negative, including constitutional, head and neck, cardiovascular, pulmonary, gastrointestinal, endocrine, urologic, reproductive, rheumatic, hematologic, immunologic, dermatologic, and psychiatric.    Nutritional concerns: None  Driving issues: None   Safety concerns regarding living situations and safety at home: None  Risk of falls: None  Pain: None    PHYSICAL EXAM:     Hair, skin, nails, and joints were normal. Neck was supple without Lhermitte's phenomenon.  There was no percussion tenderness over the spine.      The patient was alert and oriented to person, place, and time with normal language, attention and concentration, recent and remote memory, praxis, and intellectual function. Affect was normal. The patient did not appear depressed.     Visual acuity: Deferred for Dr. Kaur to evaluate later today     Visual fields were full to confrontation.   Pupils were 4 mm and briskly reactive OU with a MILD relative afferent pupillary defect OD.  Funduscopic examination was normal without disc edema, erythema, or atrophy.  Extraocular movements: Intact without BINU  Facial sensation is normal. Normal strength of the muscles of mastication:   Muscles of facial expression were normal  Hearing was normal. Gag reflex and palatal movements were normal. Sternocleidomastoid and trapezius power were normal. Tongue movements were normal. There was no dysarthria.     Motor Examination:   There was no pronator drift.         Motor     Upper         Right Left   Shoulder Abduction 5 5   Elbow Flexion 5 5   Elbow Extension 5 5   Wrist Extension 5 5   Digit Extension 5 5   Digit Flexion 5 5   APB 5 5   Tone 0 0   Lower          Right Left   Hip Flexion 5 5   Knee Extension 5 5   Knee Flexion 5 5   Foot Dorsiflexion 5 5   Foot Plantar Flexion 5 5   EH 5 5   Toe Flexion 5 5   Tone 0 0            There is subjective weakness of the  L side both upper an lower extremities, effort dependent.        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    PLAN:    Start prednisone 25 pills every day (12.5 BID), continue omeprazole and or mylanta.    Continue prednisone 20 mg po every day after high dose steroids.    She will be seen by neurophthalmology today as I have arranged.    MRI tomorrow.    Finally I will follow the patient up in 1 month(s) as long as the patient is doing well. I instructed the patient to call or mychart my office with any concerns or questions.    I spent 45 minutes in this visit, with >50% direct patient time spent counseling about prognosis, treatment options, and coordination of care.     My recommendations will be communicated back to the patient's physician(s) by mail.  Follow-up is expected to be with me.    Addendum: MRI of the brain w/wo contrast from 5-16 shows enhancement of the R optic nerve improved vs January 2019, supportive of exacerbation of ON.    Lea Barragan MD  Chief, Multiple Sclerosis Division  Department of Neurology  [facility]      (Chart documentation was completed in part with Dragon voice-recognition software. Even though reviewed, some grammatical, spelling, and word errors may remain.)

## 2019-05-15 NOTE — PATIENT INSTRUCTIONS
Complete three days of the high dose oral prednisone then start Prednisone 60 mg daily until next visit

## 2019-05-15 NOTE — TELEPHONE ENCOUNTER
Spoke with patient.  She states things have improved but the fuzziness in her central vision is still there.  I told her we should see her clinic.  She is following up today with Dr. Barragan.  She will see what Dr. Barragan has to say and then can find leave me a message and we will find time to get patient seen.

## 2019-05-15 NOTE — LETTER
5/15/2019       RE: Kerrie Patel  [id number]  [place] [address]     Dear Colleague,    Thank you for referring your patient, Kerrie Patel, to the [facility] MULTIPLE SCLEROSIS at [facility]. Please see a copy of my visit note below.    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 Who was last seen on  4-25.   At that time the patient was recommended to taper prednisone off. Since last visit she was doing well until last Tuesday when she developed a headache similar to the original episode of ON and she called the clinic to ask for advise since her last dose of 10 mg of prednisone was the next day. On Wednesday she got a call back from Ana jacobson RN who said she will communicate with Dr. Larose. On Thursday morning she heard back from the clinic not to take the prescribed 25 pills of prednisone (50 mg each) I had prescribed in case of a relapse, she was told to start the 20 mg every day and the eye drops that were recommended by Dr Kaur. On Thursday evening she developed blurry vision in the R eye but her headache got better with the increase on the prednisone. She did not report the new visual symptoms to neurology or ophthalmology and thinks they are getting worse. She also thinks her L sided weakness is back.    She was counseled extensively on the importance of reporting visual changes immediately.    Current Symptoms:  1. R eye blurriness  2. l eye pain (mild)  3. L sided weaknees        Current Outpatient Prescriptions:  Current Outpatient Medications   Medication     calcium carbonate (OS-LISA) 1500 (600 Ca) MG tablet     Cholecalciferol 5000 units TABS     HYDROcodone-acetaminophen (NORCO) 5-325 MG tablet     PREDNISONE PO     sulfamethoxazole-trimethoprim  (BACTRIM DS/SEPTRA DS) 800-160 MG tablet     pantoprazole (PROTONIX) 40 MG EC tablet     No current facility-administered medications for this visit.           ALLERGIES       Allergies   Allergen Reactions     Amoxicillin Nausea and Vomiting     Lactose      Other reaction(s): GI Bleeding           REVIEW OF SYSTEMS:    Comprehensive review of systems otherwise was negative, including constitutional, head and neck, cardiovascular, pulmonary, gastrointestinal, endocrine, urologic, reproductive, rheumatic, hematologic, immunologic, dermatologic, and psychiatric.    Nutritional concerns: None  Driving issues: None   Safety concerns regarding living situations and safety at home: None  Risk of falls: None  Pain: None    PHYSICAL EXAM:     Hair, skin, nails, and joints were normal. Neck was supple without Lhermitte's phenomenon.  There was no percussion tenderness over the spine.      The patient was alert and oriented to person, place, and time with normal language, attention and concentration, recent and remote memory, praxis, and intellectual function. Affect was normal. The patient did not appear depressed.     Visual acuity: Deferred for Dr. Kaur to evaluate later today     Visual fields were full to confrontation.   Pupils were 4 mm and briskly reactive OU with a MILD relative afferent pupillary defect OD.  Funduscopic examination was normal without disc edema, erythema, or atrophy.  Extraocular movements: Intact without BINU  Facial sensation is normal. Normal strength of the muscles of mastication:   Muscles of facial expression were normal  Hearing was normal. Gag reflex and palatal movements were normal. Sternocleidomastoid and trapezius power were normal. Tongue movements were normal. There was no dysarthria.     Motor Examination:   There was no pronator drift.         Motor     Upper         Right Left   Shoulder Abduction 5 5   Elbow Flexion 5 5   Elbow Extension 5 5   Wrist Extension 5 5   Digit Extension 5  5   Digit Flexion 5 5   APB 5 5   Tone 0 0   Lower          Right Left   Hip Flexion 5 5   Knee Extension 5 5   Knee Flexion 5 5   Foot Dorsiflexion 5 5   Foot Plantar Flexion 5 5   EH 5 5   Toe Flexion 5 5   Tone 0 0            There is subjective weakness of the L side both upper an lower extremities, effort dependent.        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    PLAN:    Start prednisone 25 pills every day (12.5 BID), continue omeprazole and or mylanta.    Continue prednisone 20 mg po every day after high dose steroids.    She will be seen by neurophthalmology today as I have arranged.    MRI tomorrow.    Finally I will follow the patient up in 1 month(s) as long as the patient is doing well. I instructed the patient to call or mychart my office with any concerns or questions.    I spent 45 minutes in this visit, with >50% direct patient time spent counseling about prognosis, treatment options, and coordination of care.     My recommendations will be communicated back to the patient's physician(s) by mail.  Follow-up is expected to be with me.    Addendum: MRI of the brain w/wo contrast from 5-16 shows enhancement of the R optic nerve improved vs January 2019, supportive of exacerbation of ON.    Lea Barragan MD  Chief, Multiple Sclerosis Division  Department of Neurology  [facility]    (Chart documentation was  completed in part with Dragon voice-recognition software. Even though reviewed, some grammatical, spelling, and word errors may remain.)

## 2019-05-15 NOTE — NURSING NOTE
Chief Complaints and History of Present Illnesses   Patient presents with     Subjective visual disturbance      Chief Complaint(s) and History of Present Illness(es)     Subjective visual disturbance               Comments     Subjective visual disturbance evaluation.  Beginning 4/9/19 patient noticed eye pain and blurred vision right eye > left eye.  Complains of horizontal double vision that began today.  Noticeable at distance and near with glasses on.  Taking prednisone PO currently.  Eye meds: Refresh each eye   GABRIELLE Wagner 5/15/2019 3:41 PM

## 2019-05-15 NOTE — PROGRESS NOTES
HPI     Subjective visual disturbance evaluation.  Beginning 4/9/19 patient noticed eye pain and blurred vision right eye > left eye.  Complains of horizontal double vision that began today.  Noticeable at distance and near with glasses on.  Taking prednisone PO currently.  Eye meds: Refresh each eye   GABRIELLE Wagner 5/15/2019 3:41 PM      Last edited by Oscar Walker on 5/15/2019  3:41 PM. (History)             Assessment & Plan     Kerrie Patel is a 49 year old female with the following diagnoses:   1. 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 Was on Prednisone taper per neurology but developed a return of symptoms last week while finishing the taper. Experienced similar symptoms as prior episode, including headache and right eye pain. Two days after symptom onset noticed decrease in vision in the right eye. Was placed back on PO Prednisone 20 mg but symptoms not improving. Seen by neurology today who recommended high dose PO Prednisone. Took first dose today with plan for two more days of high dose followed by 20 mg daily.     Her visual acuity is 20/100-1 RIGHT eye and 20/20 LEFT eye. She sees 0/11 color plates RIGHT eye and 11/11 LEFT eye. She has an afferent pupillary defect RIGHT eye. Her visual field shows dense constriction RIGHT eye. Previously normal results LEFT eye. Her optical coherence tomography show thinning of the right eye (stable compared to 04/2019 but worse since 02/2019).     Lab results  Normal: treponema, ANCA, ACE, IgG subclasses, vasculitis panel, NMO, KIM, ESR, B12, CRP, Quant Gold  Abnormal: MOG 1:20 titer     Imaging: MRI thoracic and cervical unremarkable. Per patient, Dr. Barragan saw 2 lesions in her spine that the radiologist did not see.     PLAN:  - Complete 3 days of high dose PO Prednisone (s/p 1 dose today) then start PO Prednisone 60 mg daily  - Obtain MRI orbits w/ & w/o  contrast - arranged for tomorrow in [place], [place]  - Follow-up with Dr. Kaur (neuro-ophthalmology) next week - message sent to RN to assist with scheduling    RTC: 1 week with neuro-ophthalmology    Kavon Hart MD  Ophthalmology Resident, PGY-2  Pager: [phone]    Patient seen and discussed with Dr. Iniguez and Dr. Mckinley.    Complete documentation of historical and exam elements from today's encounter can be found in the full encounter summary report (not reduplicated in this progress note).  I personally obtained the chief complaint(s) and history of present illness.  I confirmed and edited as necessary the review of systems, past medical/surgical history, family history, social history, and examination findings as documented by others; and I examined the patient myself.  I personally reviewed the relevant tests, images, and reports as documented above.  I formulated and edited as necessary the assessment and plan and discussed the findings and management plan with the patient and family. - Xavier Mckinley MD

## 2019-05-16 ENCOUNTER — TELEPHONE (OUTPATIENT)
Dept: OPHTHALMOLOGY | Facility: CLINIC | Age: 49
End: 2019-05-16

## 2019-05-16 ENCOUNTER — HOSPITAL ENCOUNTER (OUTPATIENT)
Dept: MRI IMAGING | Facility: OTHER | Age: 49
Discharge: HOME | End: 2019-05-16
Attending: STUDENT IN AN ORGANIZED HEALTH CARE EDUCATION/TRAINING PROGRAM | Admitting: FAMILY MEDICINE
Payer: COMMERCIAL

## 2019-05-16 ENCOUNTER — TRANSFERRED RECORDS (OUTPATIENT)
Dept: HEALTH INFORMATION MANAGEMENT | Facility: CLINIC | Age: 49
End: 2019-05-16

## 2019-05-16 DIAGNOSIS — H46.9 OPTIC NEURITIS: ICD-10-CM

## 2019-05-16 PROCEDURE — 70543 MRI ORBT/FAC/NCK W/O &W/DYE: CPT

## 2019-05-16 PROCEDURE — A9575 INJ GADOTERATE MEGLUMI 0.1ML: HCPCS | Performed by: STUDENT IN AN ORGANIZED HEALTH CARE EDUCATION/TRAINING PROGRAM

## 2019-05-16 PROCEDURE — 25500064 ZZH RX 255 OP 636: Performed by: STUDENT IN AN ORGANIZED HEALTH CARE EDUCATION/TRAINING PROGRAM

## 2019-05-16 RX ADMIN — GADOTERATE MEGLUMINE 16 ML: 376.9 INJECTION INTRAVENOUS at 07:45

## 2019-05-16 NOTE — TELEPHONE ENCOUNTER
Spoke to patient about results of MRI showing still some inflammation of optic nerve but unable to tell if from previous attack or new as we have not seen off steroids.  She should continue high dose steroids for total of 3 days and then drop down to 60 mg every day until we see her.  Follow up 5/22.  Call if vision worsens in the meantime.

## 2019-05-22 ENCOUNTER — HOSPITAL ENCOUNTER (INPATIENT)
Facility: CLINIC | Age: 49
LOS: 9 days | Discharge: HOME | End: 2019-05-31
Attending: PSYCHIATRY & NEUROLOGY | Admitting: PSYCHIATRY & NEUROLOGY
Payer: COMMERCIAL

## 2019-05-22 ENCOUNTER — APPOINTMENT (OUTPATIENT)
Dept: MRI IMAGING | Facility: CLINIC | Age: 49
End: 2019-05-22
Attending: STUDENT IN AN ORGANIZED HEALTH CARE EDUCATION/TRAINING PROGRAM
Payer: COMMERCIAL

## 2019-05-22 ENCOUNTER — OFFICE VISIT (OUTPATIENT)
Dept: OPHTHALMOLOGY | Facility: CLINIC | Age: 49
End: 2019-05-22
Payer: COMMERCIAL

## 2019-05-22 ENCOUNTER — APPOINTMENT (OUTPATIENT)
Dept: INTERVENTIONAL RADIOLOGY/VASCULAR | Facility: CLINIC | Age: 49
End: 2019-05-22
Attending: RADIOLOGY
Payer: COMMERCIAL

## 2019-05-22 DIAGNOSIS — H46.9 OPTIC NEURITIS: Primary | ICD-10-CM

## 2019-05-22 DIAGNOSIS — G36.0 NEUROMYELITIS OPTICA SPECTRUM DISORDER (H): ICD-10-CM

## 2019-05-22 DIAGNOSIS — K85.80 OTHER ACUTE PANCREATITIS WITHOUT INFECTION OR NECROSIS: Primary | ICD-10-CM

## 2019-05-22 DIAGNOSIS — H53.10 SUBJECTIVE VISUAL DISTURBANCE: Primary | ICD-10-CM

## 2019-05-22 LAB
ABO + RH BLD: NORMAL
ABO + RH BLD: NORMAL
ALBUMIN SERPL-MCNC: 3.5 G/DL (ref 3.4–5)
ALP SERPL-CCNC: 59 U/L (ref 40–150)
ALT SERPL W P-5'-P-CCNC: 31 U/L (ref 0–50)
ANION GAP SERPL CALCULATED.3IONS-SCNC: 6 MMOL/L (ref 3–14)
AST SERPL W P-5'-P-CCNC: 13 U/L (ref 0–45)
BILIRUB SERPL-MCNC: 0.2 MG/DL (ref 0.2–1.3)
BLD GP AB SCN SERPL QL: NORMAL
BLOOD BANK CMNT PATIENT-IMP: NORMAL
BUN SERPL-MCNC: 15 MG/DL (ref 7–30)
CALCIUM SERPL-MCNC: 9.1 MG/DL (ref 8.5–10.1)
CHLORIDE SERPL-SCNC: 107 MMOL/L (ref 94–109)
CO2 SERPL-SCNC: 28 MMOL/L (ref 20–32)
CREAT SERPL-MCNC: 0.63 MG/DL (ref 0.52–1.04)
ERYTHROCYTE [DISTWIDTH] IN BLOOD BY AUTOMATED COUNT: 13.5 % (ref 10–15)
GFR SERPL CREATININE-BSD FRML MDRD: >90 ML/MIN/{1.73_M2}
GLUCOSE SERPL-MCNC: 128 MG/DL (ref 70–99)
HCT VFR BLD AUTO: 46.7 % (ref 35–47)
HGB BLD-MCNC: 14.9 G/DL (ref 11.7–15.7)
MAGNESIUM SERPL-MCNC: 2.6 MG/DL (ref 1.6–2.3)
MCH RBC QN AUTO: 30.3 PG (ref 26.5–33)
MCHC RBC AUTO-ENTMCNC: 31.9 G/DL (ref 31.5–36.5)
MCV RBC AUTO: 95 FL (ref 78–100)
PLATELET # BLD AUTO: 208 10E9/L (ref 150–450)
POTASSIUM SERPL-SCNC: 4.1 MMOL/L (ref 3.4–5.3)
PROT SERPL-MCNC: 6.5 G/DL (ref 6.8–8.8)
RBC # BLD AUTO: 4.92 10E12/L (ref 3.8–5.2)
SODIUM SERPL-SCNC: 140 MMOL/L (ref 133–144)
SPECIMEN EXP DATE BLD: NORMAL
WBC # BLD AUTO: 12.8 10E9/L (ref 4–11)

## 2019-05-22 PROCEDURE — 25000132 ZZH RX MED GY IP 250 OP 250 PS 637: Performed by: STUDENT IN AN ORGANIZED HEALTH CARE EDUCATION/TRAINING PROGRAM

## 2019-05-22 PROCEDURE — 36556 INSERT NON-TUNNEL CV CATH: CPT

## 2019-05-22 PROCEDURE — 25000128 H RX IP 250 OP 636: Performed by: RADIOLOGY

## 2019-05-22 PROCEDURE — 12000001 ZZH R&B MED SURG/OB UMMC

## 2019-05-22 PROCEDURE — 86900 BLOOD TYPING SEROLOGIC ABO: CPT | Performed by: STUDENT IN AN ORGANIZED HEALTH CARE EDUCATION/TRAINING PROGRAM

## 2019-05-22 PROCEDURE — 85027 COMPLETE CBC AUTOMATED: CPT | Performed by: STUDENT IN AN ORGANIZED HEALTH CARE EDUCATION/TRAINING PROGRAM

## 2019-05-22 PROCEDURE — 86901 BLOOD TYPING SEROLOGIC RH(D): CPT | Performed by: STUDENT IN AN ORGANIZED HEALTH CARE EDUCATION/TRAINING PROGRAM

## 2019-05-22 PROCEDURE — 02H633Z INSERTION OF INFUSION DEVICE INTO RIGHT ATRIUM, PERCUTANEOUS APPROACH: ICD-10-PCS | Performed by: RADIOLOGY

## 2019-05-22 PROCEDURE — 25500064 ZZH RX 255 OP 636: Performed by: PSYCHIATRY & NEUROLOGY

## 2019-05-22 PROCEDURE — 36415 COLL VENOUS BLD VENIPUNCTURE: CPT | Performed by: STUDENT IN AN ORGANIZED HEALTH CARE EDUCATION/TRAINING PROGRAM

## 2019-05-22 PROCEDURE — 83735 ASSAY OF MAGNESIUM: CPT | Performed by: STUDENT IN AN ORGANIZED HEALTH CARE EDUCATION/TRAINING PROGRAM

## 2019-05-22 PROCEDURE — C1769 GUIDE WIRE: HCPCS

## 2019-05-22 PROCEDURE — 25800030 ZZH RX IP 258 OP 636: Performed by: STUDENT IN AN ORGANIZED HEALTH CARE EDUCATION/TRAINING PROGRAM

## 2019-05-22 PROCEDURE — 86850 RBC ANTIBODY SCREEN: CPT | Performed by: STUDENT IN AN ORGANIZED HEALTH CARE EDUCATION/TRAINING PROGRAM

## 2019-05-22 PROCEDURE — 72157 MRI CHEST SPINE W/O & W/DYE: CPT

## 2019-05-22 PROCEDURE — 27210904 ZZH KIT CR6

## 2019-05-22 PROCEDURE — C1752 CATH,HEMODIALYSIS,SHORT-TERM: HCPCS

## 2019-05-22 PROCEDURE — 27210908 ZZH NEEDLE CR4

## 2019-05-22 PROCEDURE — 76937 US GUIDE VASCULAR ACCESS: CPT

## 2019-05-22 PROCEDURE — 72156 MRI NECK SPINE W/O & W/DYE: CPT

## 2019-05-22 PROCEDURE — 80053 COMPREHEN METABOLIC PANEL: CPT | Performed by: STUDENT IN AN ORGANIZED HEALTH CARE EDUCATION/TRAINING PROGRAM

## 2019-05-22 PROCEDURE — 25000128 H RX IP 250 OP 636: Performed by: STUDENT IN AN ORGANIZED HEALTH CARE EDUCATION/TRAINING PROGRAM

## 2019-05-22 PROCEDURE — 25000125 ZZHC RX 250: Performed by: RADIOLOGY

## 2019-05-22 PROCEDURE — 40000556 ZZH STATISTIC PERIPHERAL IV START W US GUIDANCE

## 2019-05-22 PROCEDURE — A9585 GADOBUTROL INJECTION: HCPCS | Performed by: PSYCHIATRY & NEUROLOGY

## 2019-05-22 PROCEDURE — 27210732 ZZH ACCESSORY CR1

## 2019-05-22 RX ORDER — NALOXONE HYDROCHLORIDE 0.4 MG/ML
.1-.4 INJECTION, SOLUTION INTRAMUSCULAR; INTRAVENOUS; SUBCUTANEOUS
Status: DISCONTINUED | OUTPATIENT
Start: 2019-05-22 | End: 2019-05-31 | Stop reason: HOSPADM

## 2019-05-22 RX ORDER — MAGNESIUM SULFATE HEPTAHYDRATE 40 MG/ML
4 INJECTION, SOLUTION INTRAVENOUS EVERY 4 HOURS PRN
Status: DISCONTINUED | OUTPATIENT
Start: 2019-05-22 | End: 2019-05-31 | Stop reason: HOSPADM

## 2019-05-22 RX ORDER — BISACODYL 10 MG
10 SUPPOSITORY, RECTAL RECTAL DAILY PRN
Status: DISCONTINUED | OUTPATIENT
Start: 2019-05-22 | End: 2019-05-31 | Stop reason: HOSPADM

## 2019-05-22 RX ORDER — HEPARIN SODIUM (PORCINE) LOCK FLUSH IV SOLN 100 UNIT/ML 100 UNIT/ML
5 SOLUTION INTRAVENOUS EVERY 24 HOURS
Status: DISCONTINUED | OUTPATIENT
Start: 2019-05-22 | End: 2019-05-31 | Stop reason: HOSPADM

## 2019-05-22 RX ORDER — ACETAMINOPHEN 325 MG/1
975 TABLET ORAL EVERY 8 HOURS PRN
Status: DISCONTINUED | OUTPATIENT
Start: 2019-05-22 | End: 2019-05-31 | Stop reason: HOSPADM

## 2019-05-22 RX ORDER — CEFAZOLIN SODIUM 2 G/100ML
2 INJECTION, SOLUTION INTRAVENOUS
Status: CANCELLED | OUTPATIENT
Start: 2019-05-23

## 2019-05-22 RX ORDER — AMOXICILLIN 250 MG
2 CAPSULE ORAL 2 TIMES DAILY PRN
Status: DISCONTINUED | OUTPATIENT
Start: 2019-05-22 | End: 2019-05-31 | Stop reason: HOSPADM

## 2019-05-22 RX ORDER — HEPARIN SODIUM (PORCINE) LOCK FLUSH IV SOLN 100 UNIT/ML 100 UNIT/ML
5 SOLUTION INTRAVENOUS
Status: DISCONTINUED | OUTPATIENT
Start: 2019-05-22 | End: 2019-05-31 | Stop reason: HOSPADM

## 2019-05-22 RX ORDER — LIDOCAINE HYDROCHLORIDE 10 MG/ML
1-30 INJECTION, SOLUTION EPIDURAL; INFILTRATION; INTRACAUDAL; PERINEURAL
Status: COMPLETED | OUTPATIENT
Start: 2019-05-22 | End: 2019-05-22

## 2019-05-22 RX ORDER — POLYETHYLENE GLYCOL 3350 17 G/17G
17 POWDER, FOR SOLUTION ORAL DAILY PRN
Status: DISCONTINUED | OUTPATIENT
Start: 2019-05-22 | End: 2019-05-31 | Stop reason: HOSPADM

## 2019-05-22 RX ORDER — POTASSIUM CHLORIDE 29.8 MG/ML
20 INJECTION INTRAVENOUS
Status: DISCONTINUED | OUTPATIENT
Start: 2019-05-22 | End: 2019-05-31 | Stop reason: HOSPADM

## 2019-05-22 RX ORDER — CALCIUM CARBONATE 500(1250)
500 TABLET ORAL DAILY
Status: DISCONTINUED | OUTPATIENT
Start: 2019-05-22 | End: 2019-05-31 | Stop reason: HOSPADM

## 2019-05-22 RX ORDER — POTASSIUM CL/LIDO/0.9 % NACL 10MEQ/0.1L
10 INTRAVENOUS SOLUTION, PIGGYBACK (ML) INTRAVENOUS
Status: DISCONTINUED | OUTPATIENT
Start: 2019-05-22 | End: 2019-05-31 | Stop reason: HOSPADM

## 2019-05-22 RX ORDER — LIDOCAINE 40 MG/G
CREAM TOPICAL
Status: DISCONTINUED | OUTPATIENT
Start: 2019-05-22 | End: 2019-05-31 | Stop reason: HOSPADM

## 2019-05-22 RX ORDER — AMOXICILLIN 250 MG
1 CAPSULE ORAL 2 TIMES DAILY PRN
Status: DISCONTINUED | OUTPATIENT
Start: 2019-05-22 | End: 2019-05-31 | Stop reason: HOSPADM

## 2019-05-22 RX ORDER — POTASSIUM CHLORIDE 7.45 MG/ML
10 INJECTION INTRAVENOUS
Status: DISCONTINUED | OUTPATIENT
Start: 2019-05-22 | End: 2019-05-31 | Stop reason: HOSPADM

## 2019-05-22 RX ORDER — PANTOPRAZOLE SODIUM 40 MG/1
40 TABLET, DELAYED RELEASE ORAL DAILY
Status: DISCONTINUED | OUTPATIENT
Start: 2019-05-22 | End: 2019-05-27

## 2019-05-22 RX ORDER — HEPARIN SODIUM (PORCINE) LOCK FLUSH IV SOLN 100 UNIT/ML 100 UNIT/ML
3 SOLUTION INTRAVENOUS EVERY 24 HOURS
Status: DISCONTINUED | OUTPATIENT
Start: 2019-05-22 | End: 2019-05-22 | Stop reason: HOSPADM

## 2019-05-22 RX ORDER — GADOBUTROL 604.72 MG/ML
7.5 INJECTION INTRAVENOUS ONCE
Status: COMPLETED | OUTPATIENT
Start: 2019-05-22 | End: 2019-05-22

## 2019-05-22 RX ORDER — HEPARIN SODIUM (PORCINE) LOCK FLUSH IV SOLN 100 UNIT/ML 100 UNIT/ML
3 SOLUTION INTRAVENOUS
Status: DISCONTINUED | OUTPATIENT
Start: 2019-05-22 | End: 2019-05-22 | Stop reason: HOSPADM

## 2019-05-22 RX ORDER — POTASSIUM CHLORIDE 1.5 G/1.58G
20-40 POWDER, FOR SOLUTION ORAL
Status: DISCONTINUED | OUTPATIENT
Start: 2019-05-22 | End: 2019-05-31 | Stop reason: HOSPADM

## 2019-05-22 RX ORDER — DEXTROSE MONOHYDRATE 25 G/50ML
25-50 INJECTION, SOLUTION INTRAVENOUS
Status: CANCELLED | OUTPATIENT
Start: 2019-05-22

## 2019-05-22 RX ORDER — IBUPROFEN 600 MG/1
600 TABLET, FILM COATED ORAL EVERY 6 HOURS PRN
Status: DISCONTINUED | OUTPATIENT
Start: 2019-05-22 | End: 2019-05-22

## 2019-05-22 RX ORDER — ACETAMINOPHEN 325 MG/1
650 TABLET ORAL EVERY 4 HOURS PRN
Status: DISCONTINUED | OUTPATIENT
Start: 2019-05-22 | End: 2019-05-22

## 2019-05-22 RX ORDER — SULFAMETHOXAZOLE/TRIMETHOPRIM 800-160 MG
1 TABLET ORAL
Status: DISCONTINUED | OUTPATIENT
Start: 2019-05-22 | End: 2019-05-27

## 2019-05-22 RX ORDER — POTASSIUM CHLORIDE 750 MG/1
20-40 TABLET, EXTENDED RELEASE ORAL
Status: DISCONTINUED | OUTPATIENT
Start: 2019-05-22 | End: 2019-05-31 | Stop reason: HOSPADM

## 2019-05-22 RX ORDER — NICOTINE POLACRILEX 4 MG
15-30 LOZENGE BUCCAL
Status: CANCELLED | OUTPATIENT
Start: 2019-05-22

## 2019-05-22 RX ADMIN — HEPARIN SODIUM (PORCINE) LOCK FLUSH IV SOLN 100 UNIT/ML 3 ML: 100 SOLUTION at 16:14

## 2019-05-22 RX ADMIN — LIDOCAINE HYDROCHLORIDE 10 ML: 10 INJECTION, SOLUTION EPIDURAL; INFILTRATION; INTRACAUDAL; PERINEURAL at 15:52

## 2019-05-22 RX ADMIN — Medication 1 MG: at 21:12

## 2019-05-22 RX ADMIN — ACETAMINOPHEN 975 MG: 325 TABLET, FILM COATED ORAL at 17:03

## 2019-05-22 RX ADMIN — GADOBUTROL 7.5 ML: 604.72 INJECTION INTRAVENOUS at 20:38

## 2019-05-22 RX ADMIN — SODIUM CHLORIDE 500 MG: 9 INJECTION, SOLUTION INTRAVENOUS at 14:49

## 2019-05-22 ASSESSMENT — ACTIVITIES OF DAILY LIVING (ADL)
RETIRED_EATING: 0-->INDEPENDENT
AMBULATION: 0-->INDEPENDENT
COGNITION: 0 - NO COGNITION ISSUES REPORTED
FALL_HISTORY_WITHIN_LAST_SIX_MONTHS: NO
TOILETING: 0-->INDEPENDENT
ADLS_ACUITY_SCORE: 9
ADLS_ACUITY_SCORE: 9
RETIRED_COMMUNICATION: 0-->UNDERSTANDS/COMMUNICATES WITHOUT DIFFICULTY
SWALLOWING: 0-->SWALLOWS FOODS/LIQUIDS WITHOUT DIFFICULTY
TRANSFERRING: 0-->INDEPENDENT
DRESS: 0-->INDEPENDENT
BATHING: 0-->INDEPENDENT

## 2019-05-22 ASSESSMENT — VISUAL ACUITY
OD_CC+: -1
OS_CC: 20/20
CORRECTION_TYPE: GLASSES
OD_CC: 20/100
METHOD: SNELLEN - LINEAR

## 2019-05-22 ASSESSMENT — REFRACTION_WEARINGRX
OD_SPHERE: -7.75
OS_AXIS: 077
SPECS_TYPE: SVL
OD_AXIS: 100
OS_CYLINDER: +1.50
OS_SPHERE: -8.00
OD_CYLINDER: +1.00

## 2019-05-22 ASSESSMENT — TONOMETRY
OS_IOP_MMHG: 22
IOP_METHOD: ICARE
OD_IOP_MMHG: 23

## 2019-05-22 ASSESSMENT — EXTERNAL EXAM - RIGHT EYE: OD_EXAM: NORMAL

## 2019-05-22 ASSESSMENT — CONF VISUAL FIELD
OD_SUPERIOR_TEMPORAL_RESTRICTION: 1
OD_INFERIOR_TEMPORAL_RESTRICTION: 1

## 2019-05-22 ASSESSMENT — CUP TO DISC RATIO
OS_RATIO: 0.4
OD_RATIO: 0.4

## 2019-05-22 ASSESSMENT — EXTERNAL EXAM - LEFT EYE: OS_EXAM: NORMAL

## 2019-05-22 ASSESSMENT — SLIT LAMP EXAM - LIDS
COMMENTS: NORMAL
COMMENTS: NORMAL

## 2019-05-22 ASSESSMENT — PAIN DESCRIPTION - DESCRIPTORS: DESCRIPTORS: ACHING

## 2019-05-22 NOTE — PROGRESS NOTES
Assessment & Plan     Kerrie Patel is a 49 year old female with the following diagnoses:   1. Optic neuritis       Last seen 1 week ago for flare up of right optic neuritis.     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 Was on prednisone taper (on last day of 5mg) when she noticed some headache and right eye pain (about 1 week ago). Couple days later, she noticed decreased vision of the right eye. Her prednisone was increased back up to 20mg but symptoms were not improving.     When we last saw her, her visual acuity was 20/100 (down from 20/20) and color plates were 0/11 (down 1/11). Her visual fields showed generalized depression right eye. She was started on high dose oral prednisone 1250mg x 3 days (started 1 week ago). Now on 60mg oral. MRI orbits 5/16/19 showed some right mild enhancement of the right optic nerve (personally reviewed).     Since her last visit, she feels that the pain has resolved. She feels that the vision in her right eye also improved slightly. She did experience few episodes of sharp pain from the back of her skull to her right face down her cheek then to the left side of her face and down the cheek. Lasts few secs.     She is status post rituximab infusions (March, April). Next one scheduled in September.     Lab results  Normal: treponema, ANCA, ACE, IgG subclasses, vasculitis panel, NMO, KIM, ESR, B12, CRP, Quant Gold  Abnormal: MOG 1:20 titer      Imaging: MRI thoracic and cervical 2/2019 unremarkable. Per patient, Dr. Barragan saw 2 lesions in her spine that the radiologist did not see.     On examination, her visual acuity is 20/100 right eye and 20/20 left eye. Color plates 0/11 right eye and 11/11 left eye. Anterior segment examination shows normal eyelid, no conjunctival injection/chemosis. There was no anterior segment or anterior vitreous cells. Fundus examination showed pallor right optic nerve. Left optic nerve appears  healthy. Retinal nerve fiber layer OCT shows stable thinning RIGHT eye . GTOP shows generalized depression right eye, normal left eye.     It is my impression that Kerrie has recurrence of right optic neuritis (MOG+). Symptomatically she has improved slightly status post 3 days of high dose oral steroids.  However, she is no where close to her new baseline of 20/20.   I called Dr. Yao who is covering for Dr. Barragan. We agreed to admit Kerrie for PLEX today.  Follow-up tomorrow at [facility].             Attending Physician Attestation:  Complete documentation of historical and exam elements from today's encounter can be found in the full encounter summary report (not reduplicated in this progress note).  I personally obtained the chief complaint(s) and history of present illness.  I confirmed and edited as necessary the review of systems, past medical/surgical history, family history, social history, and examination findings as documented by others; and I examined the patient myself.  I personally reviewed the relevant tests, images, and reports as documented above.  I formulated and edited as necessary the assessment and plan and discussed the findings and management plan with the patient and family. - Javi Kaur MD

## 2019-05-22 NOTE — PROGRESS NOTES
Nursing Focus: Admission  D: Arrived at 11 am from eye clinic via ambulation. Patient accompanied by . 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     I: Admission process began.  Patient oriented to room, enviroment, call light.  Md. notified of patients arrival on unit.     A: Vital signs stable, afebrile.  Patient stable at this time.  Complaining of headache in R temporal region but denied intervention.     P: Implement plan of care when available. Continue to monitor patient. Nursing interventions as appropriate. Notify md with changes in pt status.    PIV placed L fa-difficult vasc access. IV prednisone running. Currently in IR to have non-tunneled CVC placed to start plasma exchange tomorrow and every other day for 5-7X. Eye clinic appt for tomorrow to be cancelled.

## 2019-05-22 NOTE — NURSING NOTE
Chief Complaints and History of Present Illnesses   Patient presents with     Follow Up     Chief Complaint(s) and History of Present Illness(es)     Follow Up     Laterality: right eye    Course: gradually improving    Pain scale: 4/10              Comments     Optic neuritis - Right Eye. Prednisone 60mg PO Daily. Pt states will need a refill, original Rx was 20mg daily.     Pt has noticed improvement in vision in the right eye since increasing prednisolone from 20mg to 60mg from last week. Pt has not started to taper. Pt continues to have pain, throbbing in the right eye.     Vinay Sutton COT 7:32 AM May 22, 2019

## 2019-05-22 NOTE — LETTER
Transition Communication Hand-off for Care Transitions to Next Level of Care Provider    Name: Kerrie Patel  : 1970  MRN #: [id number]  Primary Care Provider: Coby Higgins     Primary Clinic: [address]  [place] [address]     Reason for Hospitalization:  optic neuritis  Neuromyelitis optica spectrum disorder (H)  Neuromyelitis optica spectrum disorder (H)  Admit Date/Time: 2019 11:13 AM  Discharge Date: 19  Payor Source: Payor: BLUE PLUS / Plan: BLUE PLUS [place] ADVANTAGE / Product Type: HMO /     Mr Patel was admitted for treatment of her optic neuritis with IV methylprednisolone and PLEX. Unfortunately, she developed pancreatitis due to the methylprednsione and it was stopped on day 4. She was kept NPO and treated with IV fluids and PRN analgesia with improvement. She was then able to tolerate a regular diet and symptoms of abdominal pain, nausea and diarrhea improved. She was continued on 20mg of prednisone daily.     She completed 5 rounds of PLEX treatment with no complications. She was following by the neuro-ophthalmology clinic during her hospital stay. Her eye examination had improved but still with significant deficits by time of discharge, see ophthalmology note for further details.     She was discharged home with plans for follow up with neuro-ophthalmology, neurology (neuro-immunology clinic) and GI.       Discharge Plan: Home with clinic follow-up as recommended.        Follow-up plan:    Future Appointments   Date Time Provider Department Center   2019 11:30 AM Xavier Mckinley MD [facility]   2019 11:00 AM Lea Barragan MD [facility]       Any outstanding tests or procedures:        Referrals     Future Labs/Procedures    GASTROENTEROLOGY ADULT REF CONSULT ONLY     Comments:    Recurrent pancreatitis with IV steroids. ?MRCP    Preferred Location: [facility] [phone]      Please be aware that coverage of these services is subject to the terms and  limitations of your health insurance plan.  Call member services at your health plan with any benefit or coverage questions.  Any procedures must be performed at a [facility] facility OR coordinated by your clinic's referral office.    Please bring the following with you to your appointment:    (1) Any X-Rays, CTs or MRIs which have been performed.  Contact the facility where they were done to arrange for  prior to your scheduled appointment.    (2) List of current medications   (3) This referral request   (4) Any documents/labs given to you for this referral    Neurology Adult Referral     Comments:    Please schedule follow up with Dr Barragan.    OPHTHALMOLOGY ADULT REFERRAL     Comments:    Please schedule follow up with Dr Kaur.    Your provider has referred you to: [facility]: Eye Clinic [facility] [phone]   [email or web address]    Please be aware that coverage of these services is subject to the terms and limitations of your health insurance plan.  Call member services at your health plan with any benefit or coverage questions.      Please bring the following with you to your appointment:    (1) Any X-Rays, CTs or MRIs which have been performed.  Contact the facility where they were done to arrange for  prior to your scheduled appointment.    (2) List of current medications  (3) This referral request   (4) Any documents/labs given to you for this referral            Gloria Chong RN, BSN, PHN  Care Coordinator       AVS/Discharge Summary is the source of truth; this is a helpful guide for improved communication of patient story

## 2019-05-22 NOTE — PROGRESS NOTES
Patient Name: Kerrie Patel  Medical Record Number: [id number]  Today's Date: 5/22/2019    Procedure: Placed Non-Tunneled aphesis Catheter  Proceduralist: Darrian Jacob SMortazaviMD    Procedure start time: 1532  Puncture time: 1537  Procedure end time: 1620    Report given to: KATHARINE March 7D    D: Pt here via wheelchair, accompanied by PING Gonzalez RN. Pt  had consented for the procedure via phone, but pt reviewed the procedure with the proceduralist and all questions were answered. Pt reported she has previously had pain/tenderness with other non-tunneled lines across her collar bone and was hoping it could be placed away from the collar bone to prevent the pinch  Pt is A & O x 4.  VS baseline upon arrival; hypertensive.  I:Monitor pt during procedure.  A: Pt tolerated the procedure well.  She did report lots of pressure throughout - this is supposed to be expected. VS baseline throughout.  14 Fr. Schoon placed in the right internal jugular.  REF: [id number] LOT: [id number] Catheter has been verified with flouro and ready for use.  P: Pt care to continue on 7D and pt to have apheresis tomorrow.    Gave hand-off report @ 1600 with [employer]

## 2019-05-22 NOTE — PLAN OF CARE
Pt alert and oriented x4. Pt had  tunneled catheter placed on right the right side of the neck. Pt given tylenol for pain. Pt denied nausea. Pt on regular diet. Pt reported good appetite. Pt scheduled to start plasma exchange tomorrow. Pt went down for MRI of spine. Pt reported good U/O. Pt given melatonin @ bedtime. Continue to monitor.

## 2019-05-22 NOTE — CONSULTS
Transfusion Medicine Consultation    Kerrie Patel [id number]   YOB: 1970 Age: 49 year old   Date of Consult: 5/22/2019      Reason for consult: Therapeutic Plasma Exchange (TPE) for suspected Neuromyelitis Optica (NMO)/recurrent MOG-IgG associated optic neuritis           Assessment and Plan:   49 year old female is seen for consultation for initiation of TPE for optic neuritis due to myelin oligodendrocyte glycoprotein (MOG). History of optic neuropathy/neuritis and idiopathic orbital inflammatory syndrome. Received TPE x 5 at an [facility] in [place] on February. Was on prednisone taper when she noticed right eye pain about 1 week ago. Then couple days later, she noticed decreased vision of the right eye. Her prednisone was increased back up to 20mg but symptoms were not improving. Recent MRI shows subtle enhancement of the right optic nerve compatible with a history of optic neuritis. She is also status post rituximab infusions (March, April). Most recent MOG titer 1:20.    A series of therapeutic plasma exchanges (TPE) have been requested.  This is an ASFA Category II indication for TPE. We reviewed the procedure with the patient.  We discussed the potential risks and benefits of TPE. In particular we discussed that not all patients respond to this therapy and that it may have no benefit if this is not due an antibody mediated mechanism.  We discussed replacement fluid options. We reviewed the potential risks and benefits of blood transfusion. All of her questions were answered. Consent was obtained     - Plan 5 TPE approximately every other day, first to be performed tomorrow  - Will require a central line placement   - Will plan to use 5% albumin for replacement fluid.  Will monitor INR and fibrinogen for recovery of coagulapathy.  May require replacement with plasma if has poor recovery, develops bleeding, or has an invasive procedure planned.  - ACD-A for anticoagulation. Will give calcium gluconate in  the return to offset effects and monitor iCa.  - Do not start ACE inhibitors throughout the duration of the TPE series as these have been associated with reactions during apheresis.    - Notify the Transfusion Medicine physician of any upcoming procedures, surgeries, or biopsies as TPE with albumin replacement will affect coagulation factor levels.  - Consider the impact of TPE on laboratory studies and medication levels.  IVIG and other immune therapies such as rituximab should NOT be given right prior to TPE but rather after due to removal.            Chief Complaint:   Eye pain and visual changes.         History of Present Illness:   Kerrie Patel is a 49 year old female who is seen for consultation for Therapeutic Plasma Exchange for recurrent NMO/MOG associated optic neuritis.  She denies a significant medical history other than the current disease progression. The patient also denies any back pain that would prevent her from tolerating the procedure and she has no allergies to latex. The travel history is negative.  The patient has no identifiable risk factors for infectious disease.  The procedure, risks/benefits were discussed with the patient and all of her questions were addressed at that time.             Past Medical History:     Past Medical History:   Diagnosis Date     Diffuse cystic mastopathy of breast     No Comments Provided     Family history of diabetes mellitus     No Comments Provided     Gastro-esophageal reflux disease without esophagitis     No Comments Provided     Pain in thoracic spine     No Comments Provided             Past Surgical History:     Past Surgical History:   Procedure Laterality Date      SECTION      96, 99     CHOLECYSTECTOMY           OTHER SURGICAL HISTORY      2012,TVQ360,PREMALIG/BENIGN SKIN LESION EXCISION,BREAST LESION     OTHER SURGICAL HISTORY      2017,32899.0,MI TLH W TUBES/OVAR 250 G OR LESS              Social History:     Social History      Tobacco Use     Smoking status: Never Smoker     Smokeless tobacco: Never Used   Substance Use Topics     Alcohol use: No     Alcohol/week: 0.0 oz     Comment: rare             Family History:     Family History   Problem Relation Age of Onset     Diabetes Father         Diabetes,Type 2, Diverticulitis age 47      Breast Cancer No family hx of         Cancer-breast     Glaucoma No family hx of      Macular Degeneration No family hx of              Immunizations:     Immunization History   Administered Date(s) Administered     Influenza (H1N1) 11/24/2009     Influenza Vaccine IM 3yrs+ 4 Valent IIV4 10/23/2014     TDAP Vaccine (Boostrix) 06/13/2017             Allergies:     Allergies   Allergen Reactions     Amoxicillin Nausea and Vomiting     Lactose      Other reaction(s): GI Bleeding             Medications:     Current Facility-Administered Medications   Medication     acetaminophen (TYLENOL) tablet 975 mg     bisacodyl (DULCOLAX) Suppository 10 mg     calcium carbonate (OS-LISA) tablet 500 mg     [START ON 5/23/2019] cholecalciferol (VITAMIN D3) 5000 units (125 mcg) capsule 5,000 Units     heparin 100 UNIT/ML injection 3 mL     heparin 100 UNIT/ML injection 3 mL     lidocaine (PF) (XYLOCAINE) 1 % injection 1-30 mL     magnesium sulfate 4 g in 100 mL sterile water (premade)     melatonin tablet 1 mg     methylPREDNISolone sodium succinate (solu-MEDROL) 500 mg in sodium chloride 0.9 % 100 mL intermittent infusion     naloxone (NARCAN) injection 0.1-0.4 mg     pantoprazole (PROTONIX) EC tablet 40 mg     polyethylene glycol (MIRALAX/GLYCOLAX) Packet 17 g     potassium chloride (KLOR-CON) Packet 20-40 mEq     potassium chloride 10 mEq in 100 mL intermittent infusion with 10 mg lidocaine     potassium chloride 10 mEq in 100 mL sterile water intermittent infusion (premix)     potassium chloride 20 mEq in 50 mL intermittent infusion     potassium chloride ER (K-DUR/KLOR-CON M) CR tablet 20-40 mEq     senna-docusate  (SENOKOT-S/PERICOLACE) 8.6-50 MG per tablet 1 tablet    Or     senna-docusate (SENOKOT-S/PERICOLACE) 8.6-50 MG per tablet 2 tablet     sodium chloride (PF) 0.9% PF flush 10 mL     sodium chloride (PF) 0.9% PF flush 10 mL     sulfamethoxazole-trimethoprim (BACTRIM DS/SEPTRA DS) 800-160 MG per tablet 1 tablet             Review of Systems:   CONSTITUTIONAL: NEGATIVE for fever, chills, change in weight  ENT/MOUTH: POSITIVE for visual change. Negative for for ear, mouth and throat problems  RESP: NEGATIVE for significant cough or SOB  CV: NEGATIVE for chest pain, palpitations or peripheral edema           Vital Signs:   /79 (BP Location: Left arm)   Temp 96.6  F (35.9  C) (Oral)   Resp 18   SpO2 95%               Data:     ROUTINE ICU LABS (Last four results)  CMP  Recent Labs   Lab 05/22/19  1357      POTASSIUM 4.1   CHLORIDE 107   CO2 28   ANIONGAP 6   *   BUN 15   CR 0.63   GFRESTIMATED >90   GFRESTBLACK >90   LISA 9.1   PROTTOTAL 6.5*   ALBUMIN 3.5   BILITOTAL 0.2   ALKPHOS 59   AST 13   ALT 31     CBC  Recent Labs   Lab 05/22/19  1357   WBC 12.8*   RBC 4.92   HGB 14.9   HCT 46.7   MCV 95   MCH 30.3   MCHC 31.9   RDW 13.5            Sony Whyte MD  Fellow, Transfusion Medicine  Pager: [phone]

## 2019-05-22 NOTE — CONSULTS
Patient is on IR schedule 5/23/2019 for a double lumen nontunneled central line   Labs WNL for procedure.   No NPO required.   Consent will be done prior to procedure.  Neuromyelitis optica spectrum disorderrequiring plex request   Please contact the IR [place] RN at [phone] for estimated time of procedure.           Veronica Carbajal IR RPA  [phone] Call pager  [phone] pager

## 2019-05-22 NOTE — PROCEDURES
Interventional Radiology Brief Post Procedure Note    Procedure: IR CVC NON TUNNEL PLACEMENT    Proceduralist: Thom Palma MD    Assistant: Justine Lopez MD and None    Time Out: Prior to the start of the procedure and with procedural staff participation, I verbally confirmed the patient s identity using two indicators, relevant allergies, that the procedure was appropriate and matched the consent or emergent situation, and that the correct equipment/implants were available. Immediately prior to starting the procedure I conducted the Time Out with the procedural staff and re-confirmed the patient s name, procedure, and site/side. (The Joint Commission universal protocol was followed.)  Yes    Medications   Medication Event Details Admin User Admin Time   sodium chloride (PF) 0.9% PF flush 10 mL Medication Canceled Entry Route: Intracatheter; Scheduled Time:  4:00 PM Anca Vicente RN 5/22/2019  3:30 PM   sodium chloride (PF) 0.9% PF flush 10 mL Medication Not Given Dose: 10 mL; Route: Intracatheter; Reason: Patient not available; Scheduled Time:  3:15 PM Anca Vicente RN 5/22/2019  3:38 PM   lidocaine (PF) (XYLOCAINE) 1 % injection 1-30 mL Medication Given by Other Clinician Dose: 10 mL; Route: Subcutaneous Philomena Sandoval RN 5/22/2019  3:52 PM   heparin 100 UNIT/ML injection 3 mL Medication Given Dose: 3 mL; Route: Intracatheter India Carrillo RN 5/22/2019  4:14 PM   heparin 100 UNIT/ML injection 3 mL Medication Given Dose: 3 mL; Route: Intracatheter; Scheduled Time:  3:15 PM India Carrillo RN 5/22/2019  4:14 PM       Sedation: IR Nurse Monitored Care   Post Procedure Summary:  Prior to the start of the procedure and with procedural staff participation, I verbally confirmed the patient s identity using two indicators, relevant allergies, that the procedure was appropriate and matched the consent or emergent situation, and that the correct equipment/implants were available.  Immediately prior to starting the procedure I conducted the Time Out with the procedural staff and re-confirmed the patient s name, procedure, and site/side. (The Joint Commission universal protocol was followed.)  Yes       Sedatives: Fentanyl and Midazolam (Versed)    Vital signs, airway and pulse oximetry were monitored and remained stable throughout the procedure and sedation was maintained until the procedure was complete.  The patient was monitored by staff until sedation discharge criteria were met.    Patient tolerance: Patient tolerated the procedure well with no immediate complications.    Time of sedation in minutes: 45 Minutes minutes from beginning to end of physician one to one monitoring.          Findings: Right sided Non tunneled line placement     Estimated Blood Loss: Less than 10 ml    Fluoroscopy Time: 4.4 minute(s)    SPECIMENS: None    Complications: 1. None     Condition: Stable    Plan: transport to the floor. Please follow the order instruction.    Comments: See dictated procedure note for full details.    Justine Lopez MD

## 2019-05-22 NOTE — H&P
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  She is a patient of Dr. Barragan and neuro immunology and Dr. Kaur in neuro-ophthalmology who presents as a direct admit from Dr. Kaur's clinic.  Please refer to their notes for complete details. in brief she first developed right eye pain and visual changes and a rapid course over 48 hours on 2019.  She was diagnosed with optic neuritis. She was initially managed in [place] and treated with high-dose IV steroids with development of pancreatitis.  She had persistent visual symptoms and underwent plasma exchange between late January and early 2019, which she thought gave some benefit.  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 She was previously admitted in 2019 for methylpred and rituximab with for ongoing right vision impairment and left sided weakness.  She then underwent a second dose of rituximab approximately 2 weeks after discharge in 2019.  After treatment with high-dose steroids and rituximab she had an improvement in her right vision.  She continued on a prednisone taper and on 2019 she followed up with Dr. Barragan who continued to taper with plans to discontinue prednisone altogether.  She was stable and doing well at that time.  She followed up with Dr. Kaur who noted that her visual acuity had improved significantly in her right eye to 20/20-1 although she still had 1/11 color plates in  the right eye and a persistent afferent pupillary defect in the right eye.  She also had abnormal visual field testing.  In 5/15/2019 she followed up with neuro-ophthalmology when she experienced return of her right visual symptoms after completing the prednisone taper previously prescribed.  She had recurrent right sided head pain and eye pain.  She then developed decreased vision.  Her root visual acuity decreased from 20/20 to 20/100-1 and 0/11 color plates right eye and she was placed on 1250 mg prednisone oral x3 days.  She returned to neuro-ophthalmology clinic today and she had no improvement of her visual acuity after outpatient oral steroids and she was admitted directly to neurology service at the [facility].    In brief she has right decreased visual acuity but no orbital pain or pain with eye movement.  She has a dull throbbing headache on the right side of her head including her eye lower face and is spreading to the left lower face which she rates at a 4/10.  She occasionally gets a sharp stabbing pain that goes through the top of her right skull to her lower face and then shoots across to the left side of her face.  The sharp stabbing pains last only a few seconds but have been occurring multiple times a day.  She is using extra strength Tylenol for management of head pain with some benefit.  She also feels a heaviness in her left arm and leg although no objective weakness was noted by Dr. Barragan on 5/15/2019.  She denies numbness or tingling.  She has no dysphagia, dysarthria, eyelid weakness, or facial weakness.  No constipation or urinary retention.    Previous treatment history is notable for development of pancreatitis with high-dose methylprednisolone 1000 mg daily and visual hallucination with high-dose methylprednisolone.  After these 2 adverse events she was placed on divided methylprednisolone dosing with twice daily 500 mg IV with better tolerance.    Of note prior imaging  2019 shows T2 hyperintense lesions in brain, cervical and thoracic spine as well as an area of anterior cervical enhancement that was not reported by radiology.  Imaging obtained 2019 shows mild right optic nerve enhancement.    ROS: A 10-point ROS was performed as per HPI.  Bilateral feet swelling    PMH:  GERD  MOG-opathy   History of pancreatitis    Past Surgical History:   Procedure Laterality Date      SECTION      96, 99     CHOLECYSTECTOMY      2003     OTHER SURGICAL HISTORY      2012,RKF019,PREMALIG/BENIGN SKIN LESION EXCISION,BREAST LESION     OTHER SURGICAL HISTORY      2017,37192.0,AR TLH W TUBES/OVAR 250 G OR LESS     Allergies:  Allergies   Allergen Reactions     Amoxicillin Nausea and Vomiting     Lactose      Other reaction(s): GI Bleeding     Medications:    Outpatient medications include  Calcium   Vitamin D 5000 units daily  Omeprazole  Bactrim prophylaxis   Prednisone 60 mg daily  Magnesium for sleep      Current Facility-Administered Medications:      acetaminophen (TYLENOL) tablet 975 mg, 975 mg, Oral, Q8H PRN, Deyanira Dailey MD     bisacodyl (DULCOLAX) Suppository 10 mg, 10 mg, Rectal, Daily PRN, Deyanira Dailey MD     calcium carbonate (OS-LISA) tablet 500 mg, 500 mg, Oral, Daily, Deyanira Dailey MD     [START ON 2019] cholecalciferol (VITAMIN D3) 5000 units (125 mcg) capsule 5,000 Units, 5,000 Units, Oral, Daily, Deyanira Dailey MD     heparin 100 UNIT/ML injection 3 mL, 3 mL, Intracatheter, Q24H PRN, Yolande Aguilar MD     heparin 100 UNIT/ML injection 3 mL, 3 mL, Intracatheter, Q24H, Yolande Aguilar MD     magnesium sulfate 4 g in 100 mL sterile water (premade), 4 g, Intravenous, Q4H PRN, Deyanira Dailey MD     melatonin tablet 1 mg, 1 mg, Oral, At Bedtime PRN, Deyanira Dailey MD     methylPREDNISolone sodium succinate (solu-MEDROL) 500 mg in sodium chloride 0.9 % 100 mL intermittent infusion, 500 mg,  Intravenous, Q12H, Deyanira Dailey MD, 500 mg at 05/22/19 1449     naloxone (NARCAN) injection 0.1-0.4 mg, 0.1-0.4 mg, Intravenous, Q2 Min PRN, Deyanira Dailey MD     pantoprazole (PROTONIX) EC tablet 40 mg, 40 mg, Oral, Daily, Deyanira Dailey MD     polyethylene glycol (MIRALAX/GLYCOLAX) Packet 17 g, 17 g, Oral, Daily PRN, Deyanira Dailey MD     potassium chloride (KLOR-CON) Packet 20-40 mEq, 20-40 mEq, Oral or Feeding Tube, Q2H PRN, Deyanira Dailey MD     potassium chloride 10 mEq in 100 mL intermittent infusion with 10 mg lidocaine, 10 mEq, Intravenous, Q1H PRN, Deyanira Dailey MD     potassium chloride 10 mEq in 100 mL sterile water intermittent infusion (premix), 10 mEq, Intravenous, Q1H PRN, Deyanira Dailey MD     potassium chloride 20 mEq in 50 mL intermittent infusion, 20 mEq, Intravenous, Q1H PRN, Deyanira Dailey MD     potassium chloride ER (K-DUR/KLOR-CON M) CR tablet 20-40 mEq, 20-40 mEq, Oral, Q2H PRN, Deyanira Dailey MD     senna-docusate (SENOKOT-S/PERICOLACE) 8.6-50 MG per tablet 1 tablet, 1 tablet, Oral, BID PRN **OR** senna-docusate (SENOKOT-S/PERICOLACE) 8.6-50 MG per tablet 2 tablet, 2 tablet, Oral, BID PRN, Deyanira Dailey MD     sodium chloride (PF) 0.9% PF flush 10 mL, 10 mL, Intracatheter, Q1H PRN, Yolande Aguilar MD     sodium chloride (PF) 0.9% PF flush 10 mL, 10 mL, Intracatheter, Q1H, Yolande Aguilar MD     sulfamethoxazole-trimethoprim (BACTRIM DS/SEPTRA DS) 800-160 MG per tablet 1 tablet, 1 tablet, Oral, Once per day on Mon Wed Fri, Deyanira Dailey MD    Social History:  She is a never smoker never drinker.  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  She has 2 adult sons.  She is     Family History:    Family History   Problem Relation Age of Onset     Diabetes Father         Diabetes,Type 2, Diverticulitis age 47      Breast Cancer No family hx of         Cancer-breast     Glaucoma No family hx of      Macular Degeneration No family hx of         Physical Examination:   Vitals:  B/P: 164/79, T: 96.6, P: Data Unavailable, R: 18  General:  Adult, in NAD, cooperative  HEENT: NC/AT,   Cardiac:  RRR  Chest:  No respiratory distress. CTAB no w/c/r  Abdomen:  S/NT/ND  Extremities:  No LE swelling.    Skin:  No rash or lesion noted  Psych:  Normal mood and affect    Neuro:  Mental status: Alert, attentive, oriented to p/p/t. Follows commands. Fund of knowledge appropriate. Speech is fluent and comprehension. No dysarthria.  Cranial nerves: Eyes conjugate, pupils equal size, right afferent pupillary defect, EOMI, visual fields full, face symmetric, facial sensation intact, shoulder shrug strong, tongue/uvula midline, palate rise symmetric, hearing intact to conversation.   Motor:  Tone normal.  No atrophy. No abnormal movements.  Positive left pronator drift.  Right upper and lower extremity 5 out of 5 except for mild right hip flexor weakness.  Left upper extremity arm extensor 4+/5, wrist extensor 4+/5 , 4+/5 APB.  Left lower extremity hip flexor 4+/5, knee extensor 4+/5, knee flexor 4+/5 to right.    Reflexes: Slightly brisk left upper extremity reflexes relative to right.  Symmetric bilateral lower reflexes.  Negative Apolinar's bilaterally.  Toes down going.  Sensory:  Intact to LT, PP x 4 extremities.   Coordination:  FNF no dysmetria. HTS intact.  Gait: Not tested    Investigations:    Historical test within normal limits -ANCA, ACE, IgG subclasses, basic vasculitic panel, aquaporin 4, KIM, ESR, BMP 12, CRP, quant gold.  Abnormal bog 1:20 titer    MRI orbits with and without contrast 5/16/2019  IMPRESSION: There is now only subtle enhancement of the right optic  nerve compatible with a history of optic neuritis. Intensity and  extent of enhancement is significantly less than that of the prior  study.    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  Exam is notable for a right afferent pupillary defect, visual acuity OD 20/100, and mild weakness left upper and lower extremity.  She was admitted as a direct admit for Plex 5 rounds and high-dose steroid treatment.  Given her prior complications of pancreatitis and hallucinations with Solu-Medrol 1000 mg daily we will break her dose up into 500 mg twice a day.     # MOG-opathy   #Recurrent right optic neuritis  #Mild left-sided weakness  - Solu-Medrol 500 mg twice daily for 5 days  - GI prophylaxis with pantoprazole while on steroids  - Continue PCP prophylaxis with Bactrim MWF  - Plasma exchange 0/5   - IR consult for catheter placement  - Apheresis team consult  - Tylenol for headaches  - Continue vitamin D 5000 units daily  - Continue calcium carbonate 500 mg daily    #Hypertension  Blood pressures have been elevated while on steroids to SBP 160s.  She has never been on any antihypertensive medications.  We will continue to monitor for now.    #Sleep  Trouble with sleep while receiving steroids previously.    - Melatonin 1 mg nightly    FEN: Regular diet   PPx: SCDs, ambulation   Code: Full     Patient was seen and discussed with Dr. Barrington Dailey MD  Neurology PGY 2   [phone]

## 2019-05-23 ENCOUNTER — OFFICE VISIT (OUTPATIENT)
Dept: OPHTHALMOLOGY | Facility: CLINIC | Age: 49
End: 2019-05-23
Attending: OPHTHALMOLOGY
Payer: COMMERCIAL

## 2019-05-23 DIAGNOSIS — H46.9 OPTIC NEURITIS: Primary | ICD-10-CM

## 2019-05-23 DIAGNOSIS — H53.10 SUBJECTIVE VISUAL DISTURBANCE: Primary | ICD-10-CM

## 2019-05-23 DIAGNOSIS — H46.9 OPTIC NEUROPATHY: ICD-10-CM

## 2019-05-23 LAB
ANION GAP SERPL CALCULATED.3IONS-SCNC: 6 MMOL/L (ref 3–14)
BUN SERPL-MCNC: 21 MG/DL (ref 7–30)
CALCIUM SERPL-MCNC: 8.9 MG/DL (ref 8.5–10.1)
CHLORIDE SERPL-SCNC: 107 MMOL/L (ref 94–109)
CO2 SERPL-SCNC: 26 MMOL/L (ref 20–32)
CREAT SERPL-MCNC: 0.63 MG/DL (ref 0.52–1.04)
ERYTHROCYTE [DISTWIDTH] IN BLOOD BY AUTOMATED COUNT: 13.5 % (ref 10–15)
FIBRINOGEN PPP-MCNC: 296 MG/DL (ref 200–420)
GFR SERPL CREATININE-BSD FRML MDRD: >90 ML/MIN/{1.73_M2}
GLUCOSE SERPL-MCNC: 161 MG/DL (ref 70–99)
HCT VFR BLD AUTO: 44.9 % (ref 35–47)
HGB BLD-MCNC: 14.4 G/DL (ref 11.7–15.7)
INR PPP: 1.1 (ref 0.86–1.14)
MCH RBC QN AUTO: 31 PG (ref 26.5–33)
MCHC RBC AUTO-ENTMCNC: 32.1 G/DL (ref 31.5–36.5)
MCV RBC AUTO: 97 FL (ref 78–100)
PLATELET # BLD AUTO: 184 10E9/L (ref 150–450)
POTASSIUM SERPL-SCNC: 4 MMOL/L (ref 3.4–5.3)
RBC # BLD AUTO: 4.65 10E12/L (ref 3.8–5.2)
SODIUM SERPL-SCNC: 139 MMOL/L (ref 133–144)
WBC # BLD AUTO: 14.8 10E9/L (ref 4–11)

## 2019-05-23 PROCEDURE — 25000132 ZZH RX MED GY IP 250 OP 250 PS 637: Performed by: PSYCHIATRY & NEUROLOGY

## 2019-05-23 PROCEDURE — 25000128 H RX IP 250 OP 636: Performed by: STUDENT IN AN ORGANIZED HEALTH CARE EDUCATION/TRAINING PROGRAM

## 2019-05-23 PROCEDURE — 80048 BASIC METABOLIC PNL TOTAL CA: CPT | Performed by: STUDENT IN AN ORGANIZED HEALTH CARE EDUCATION/TRAINING PROGRAM

## 2019-05-23 PROCEDURE — G0463 HOSPITAL OUTPT CLINIC VISIT: HCPCS | Mod: ZF

## 2019-05-23 PROCEDURE — 85610 PROTHROMBIN TIME: CPT | Performed by: PATHOLOGY

## 2019-05-23 PROCEDURE — 12000001 ZZH R&B MED SURG/OB UMMC

## 2019-05-23 PROCEDURE — 25000125 ZZHC RX 250: Performed by: PATHOLOGY

## 2019-05-23 PROCEDURE — 25800030 ZZH RX IP 258 OP 636: Performed by: PSYCHIATRY & NEUROLOGY

## 2019-05-23 PROCEDURE — P9041 ALBUMIN (HUMAN),5%, 50ML: HCPCS | Performed by: PATHOLOGY

## 2019-05-23 PROCEDURE — 25000128 H RX IP 250 OP 636: Performed by: PSYCHIATRY & NEUROLOGY

## 2019-05-23 PROCEDURE — 25000132 ZZH RX MED GY IP 250 OP 250 PS 637: Performed by: STUDENT IN AN ORGANIZED HEALTH CARE EDUCATION/TRAINING PROGRAM

## 2019-05-23 PROCEDURE — 36415 COLL VENOUS BLD VENIPUNCTURE: CPT | Performed by: STUDENT IN AN ORGANIZED HEALTH CARE EDUCATION/TRAINING PROGRAM

## 2019-05-23 PROCEDURE — 36514 APHERESIS PLASMA: CPT

## 2019-05-23 PROCEDURE — 85027 COMPLETE CBC AUTOMATED: CPT | Performed by: STUDENT IN AN ORGANIZED HEALTH CARE EDUCATION/TRAINING PROGRAM

## 2019-05-23 PROCEDURE — 25000128 H RX IP 250 OP 636: Performed by: PATHOLOGY

## 2019-05-23 PROCEDURE — 85384 FIBRINOGEN ACTIVITY: CPT | Performed by: PATHOLOGY

## 2019-05-23 PROCEDURE — 25800030 ZZH RX IP 258 OP 636: Performed by: STUDENT IN AN ORGANIZED HEALTH CARE EDUCATION/TRAINING PROGRAM

## 2019-05-23 PROCEDURE — 92083 EXTENDED VISUAL FIELD XM: CPT | Mod: ZF | Performed by: OPHTHALMOLOGY

## 2019-05-23 RX ORDER — DEXTROSE MONOHYDRATE 25 G/50ML
25-50 INJECTION, SOLUTION INTRAVENOUS
Status: DISCONTINUED | OUTPATIENT
Start: 2019-05-23 | End: 2019-05-31 | Stop reason: HOSPADM

## 2019-05-23 RX ORDER — NICOTINE POLACRILEX 4 MG
15-30 LOZENGE BUCCAL
Status: DISCONTINUED | OUTPATIENT
Start: 2019-05-23 | End: 2019-05-31 | Stop reason: HOSPADM

## 2019-05-23 RX ORDER — ALBUMIN HUMAN 25 %
2250 INTRAVENOUS SOLUTION INTRAVENOUS
Status: COMPLETED | OUTPATIENT
Start: 2019-05-23 | End: 2019-05-23

## 2019-05-23 RX ORDER — CALCIUM GLUCONATE 100 MG/ML
AMPUL (ML) INTRAVENOUS
Status: COMPLETED | OUTPATIENT
Start: 2019-05-23 | End: 2019-05-23

## 2019-05-23 RX ORDER — HEPARIN SODIUM (PORCINE) LOCK FLUSH IV SOLN 100 UNIT/ML 100 UNIT/ML
3 SOLUTION INTRAVENOUS
Status: DISCONTINUED | OUTPATIENT
Start: 2019-05-23 | End: 2019-05-31 | Stop reason: HOSPADM

## 2019-05-23 RX ORDER — HEPARIN SODIUM (PORCINE) LOCK FLUSH IV SOLN 100 UNIT/ML 100 UNIT/ML
3 SOLUTION INTRAVENOUS ONCE
Status: COMPLETED | OUTPATIENT
Start: 2019-05-23 | End: 2019-05-23

## 2019-05-23 RX ORDER — DIPHENHYDRAMINE HYDROCHLORIDE 50 MG/ML
50 INJECTION INTRAMUSCULAR; INTRAVENOUS
Status: DISCONTINUED | OUTPATIENT
Start: 2019-05-23 | End: 2019-05-23

## 2019-05-23 RX ORDER — HEPARIN SODIUM (PORCINE) LOCK FLUSH IV SOLN 100 UNIT/ML 100 UNIT/ML
3 SOLUTION INTRAVENOUS EVERY 24 HOURS
Status: DISCONTINUED | OUTPATIENT
Start: 2019-05-23 | End: 2019-05-31 | Stop reason: HOSPADM

## 2019-05-23 RX ADMIN — CHOLECALCIFEROL CAP 125 MCG (5000 UNIT) 5000 UNITS: 125 CAP at 07:33

## 2019-05-23 RX ADMIN — PANTOPRAZOLE SODIUM 40 MG: 40 TABLET, DELAYED RELEASE ORAL at 07:33

## 2019-05-23 RX ADMIN — ACETAMINOPHEN 975 MG: 325 TABLET, FILM COATED ORAL at 19:14

## 2019-05-23 RX ADMIN — ACETAMINOPHEN 975 MG: 325 TABLET, FILM COATED ORAL at 12:12

## 2019-05-23 RX ADMIN — CALCIUM 500 MG: 500 TABLET ORAL at 07:33

## 2019-05-23 RX ADMIN — SODIUM CHLORIDE 500 MG: 9 INJECTION, SOLUTION INTRAVENOUS at 17:02

## 2019-05-23 RX ADMIN — Medication 3 ML: at 16:05

## 2019-05-23 RX ADMIN — ACETAMINOPHEN 975 MG: 325 TABLET, FILM COATED ORAL at 01:20

## 2019-05-23 RX ADMIN — SODIUM CHLORIDE 500 MG: 9 INJECTION, SOLUTION INTRAVENOUS at 05:57

## 2019-05-23 RX ADMIN — Medication 3 ML: at 16:06

## 2019-05-23 RX ADMIN — ALBUMIN HUMAN 2250 ML: 0.05 INJECTION, SOLUTION INTRAVENOUS at 14:20

## 2019-05-23 RX ADMIN — ANTICOAGULANT CITRATE DEXTROSE SOLUTION FORMULA A 589 ML: 12.25; 11; 3.65 SOLUTION INTRAVENOUS at 14:20

## 2019-05-23 RX ADMIN — CALCIUM GLUCONATE 2.95 G: 94 INJECTION, SOLUTION INTRAVENOUS at 14:20

## 2019-05-23 RX ADMIN — Medication 1 MG: at 19:14

## 2019-05-23 ASSESSMENT — VISUAL ACUITY
OS_CC: 20/20
METHOD: SNELLEN - LINEAR
OD_CC: 20/100
OD_CC+: +1

## 2019-05-23 ASSESSMENT — REFRACTION_WEARINGRX
OD_CYLINDER: +1.00
SPECS_TYPE: SVL
OD_AXIS: 100
OS_CYLINDER: +1.50
OS_SPHERE: -8.00
OS_AXIS: 077
OD_SPHERE: -7.75

## 2019-05-23 ASSESSMENT — CONF VISUAL FIELD
OD_SUPERIOR_NASAL_RESTRICTION: 1
OD_INFERIOR_TEMPORAL_RESTRICTION: 1
OD_INFERIOR_NASAL_RESTRICTION: 1
OS_NORMAL: 1
OD_SUPERIOR_TEMPORAL_RESTRICTION: 1

## 2019-05-23 ASSESSMENT — SLIT LAMP EXAM - LIDS
COMMENTS: NORMAL
COMMENTS: NORMAL

## 2019-05-23 ASSESSMENT — CUP TO DISC RATIO
OS_RATIO: 0.4
OD_RATIO: 0.4

## 2019-05-23 ASSESSMENT — ACTIVITIES OF DAILY LIVING (ADL)
ADLS_ACUITY_SCORE: 9

## 2019-05-23 ASSESSMENT — EXTERNAL EXAM - RIGHT EYE: OD_EXAM: NORMAL

## 2019-05-23 ASSESSMENT — TONOMETRY
IOP_METHOD: ICARE
OD_IOP_MMHG: 18
OS_IOP_MMHG: 19

## 2019-05-23 ASSESSMENT — PAIN DESCRIPTION - DESCRIPTORS
DESCRIPTORS: ACHING

## 2019-05-23 ASSESSMENT — MIFFLIN-ST. JEOR: SCORE: 1406.76

## 2019-05-23 ASSESSMENT — EXTERNAL EXAM - LEFT EYE: OS_EXAM: NORMAL

## 2019-05-23 NOTE — PROGRESS NOTES
Assessment & Plan     Kerrie Patel is a 49 year old female with the following diagnoses:   1. Optic neuritis    2. Optic neuropathy       Here for 1 day f/u of flare up of right optic neuritis.     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 Was on prednisone taper (on last day of 5mg) when she noticed some headache and right eye pain (about 1 week ago). Couple days later, she noticed decreased vision of the right eye. Her prednisone was increased back up to 20mg but symptoms were not improving.     When we last saw her, her visual acuity was 20/100 (down from 20/20) and color plates were 0/11 (down 1/11). Her visual fields showed generalized depression right eye. She was started on high dose oral prednisone 1250mg x 3 days (started 1 week ago). Now on 60mg oral. MRI orbits 5/16/19 showed some right mild enhancement of the right optic nerve (personally reviewed).     She is status post rituximab infusions (March, April). Next one scheduled in September.     She was admitted to the hospital yesterday for plasma exchange (plan to start today). She is getting 500mg IV solumedrol twice a day.     She feels that her vision right eye continues to improve.     Lab results  Normal: treponema, ANCA, ACE, IgG subclasses, vasculitis panel, NMO, KIM, ESR, B12, CRP, Quant Gold  Abnormal: MOG 1:20 titer      Imaging: MRI thoracic and cervical 2/2019 unremarkable. Per patient, Dr. Barragan saw 2 lesions in her spine that the radiologist did not see.     On examination, her visual acuity is 20/100 right eye and 20/20 left eye. Color plates 0/11 right eye and 11/11 left eye. Anterior segment examination shows normal eyelid, no conjunctival injection/chemosis. There was no anterior segment or anterior vitreous cells. Fundus examination showed pallor right optic nerve. Left optic nerve appears healthy. LVC shows generalized depression right eye.     It is my impression that Kerrie  has recurrence of right optic neuritis (MOG+). Symptomatically she has improved slightly status post 3 days of high dose oral steroids. She continues on 500mg IV solumedrol twice a day in house. She will be getting PLEX today (plan for total of 5 treatments).     return to clinic next Tuesday.          Attending Physician Attestation:  Complete documentation of historical and exam elements from today's encounter can be found in the full encounter summary report (not reduplicated in this progress note).  I personally obtained the chief complaint(s) and history of present illness.  I confirmed and edited as necessary the review of systems, past medical/surgical history, family history, social history, and examination findings as documented by others; and I examined the patient myself.  I personally reviewed the relevant tests, images, and reports as documented above.  I formulated and edited as necessary the assessment and plan and discussed the findings and management plan with the patient and family. - Javi Mckinley MD  Neuro-ophthalmology fellow

## 2019-05-23 NOTE — NURSING NOTE
No chief complaint on file.    Chief Complaint(s) and History of Present Illness(es)     Kerrie Patel is a 49 year old female with the following diagnoses:   1. MOG + Optic neuritis   Follow-up since yesterday. No longer experiencing pain in the right eye.   Vision slightly clearer in the right eye.   Currently on iv solumedrol. 500 mg bid. Tolerating well.   Henry ANTHONY 10:06 AM May 23, 2019

## 2019-05-23 NOTE — PLAN OF CARE
VSS on room air. Reports pain around jugular CVC, managed with PRN tylenol. Up ad rudy. Per MD note - plasma exchange scheduled for today.

## 2019-05-23 NOTE — PROGRESS NOTES
[facility], [place]   Neurology Daily Note  Kerrie Patel  [id number]  05/23/2019    Subjective: No acute events overnight. Tolerated steroids well. Discomfort at internal jugular catheter site. No significant headaches overnight and no significant brief sharp pains.     Objective   /56 (BP Location: Left arm)   Temp 97.3  F (36.3  C) (Oral)   Resp 14   SpO2 95%   General: Sitting in bed, appears comfortable.   HEENT:Normocephalic atraumatic   Cardiac: RRR   Chest: No respiratory distress, clear to auscultation   Abdomen: No abdominal discomfort   Extremities: Mild nonpitting edema bilateral ankles  Skin:  No lesions   Psych: Mood and affect stable, positive     Neuro:  Mental status: Alert, attentive, oriented. Follows commands. Speech fluent and comprehension intact.   Cranial nerves: Eyes conjugate, pupils equal size, bedside visual acuity right eye 20/200, left eye 20/25, right afferent pupillary defect, EOMI, visual fields full, face symmetric, facial sensation intact, shoulder shrug strong, tongue/uvula midline, palate rise symmetric, hearing intact to conversation.   Motor: Tone normal. No atrophy. Left pronator drift. RUE, RLE, LLE 5/5. LUE deltoid 4+/5, biceps 5/5, triceps 4+/5, wrist extensor 5/5,  5/5.   Reflexes: Toes flexor. No clonus   Sensory: Intact to light touch throughout   Coordination: FNF and HTS without dysmetria.   Gait: Not tested     Investigations    CMP   Recent Labs   Lab 05/23/19  0643 05/22/19  1357    140   POTASSIUM 4.0 4.1   CHLORIDE 107 107   CO2 26 28   ANIONGAP 6 6   * 128*   BUN 21 15   CR 0.63 0.63   GFRESTIMATED >90 >90   GFRESTBLACK >90 >90   LISA 8.9 9.1   MAG  --  2.6*   PROTTOTAL  --  6.5*   ALBUMIN  --  3.5   BILITOTAL  --  0.2   ALKPHOS  --  59   AST  --  13   ALT  --  31      CBC   Recent Labs   Lab 05/23/19  0643 05/22/19  1357   WBC 14.8* 12.8*   RBC 4.65 4.92   HGB 14.4 14.9   HCT 44.9 46.7   MCV 97 95   MCH 31.0  30.3   [facility] 31[facility]   RDW 13.5 13.5    208     Radiological Data  Recent Results (from the past 48 hour(s))   IR CVC Non Tunnel Placement    Narrative    Procedures 5/22/2019:  1. Ultrasound guidance for vascular access.  2. Non-tunneled right internal jugular central venous catheter  placement.    History: Patient needs catheter for Apheresis    Comparison: None    Attending: JUAN Marinelli MD    Fellow: SHABNAM Monk MD    Resident: Justine Beauchamp MD    Medications:   No sedation was administered. Approximately 4 cc 1% lidocaine was used  for local anesthesia.    Fluoroscopy time: 4.5 minutes    Contrast: None    Findings/procedure:    Prior to the procedure, consent was obtained from patient's daughter  over the phone.     Patient was placed on the fluoroscopy table. Preprocedural timeout was  performed. Limited internal jugular vein ultrasound demonstrated a  patent right internal jugular vein. An ultrasound image of the patent  vein was saved to the patient's record. 1% lidocaine was instilled  into the overlying soft tissues. A small skin nick was made with 11  blade scalpel. Under ultrasound guidance, right internal jugular  venotomy was made with a micropuncture needle. Ultrasound image  documenting jugular patency and needle venotomy was saved in the  patient's record.    Micropuncture needle exchanged over guidewire for the micropuncture  sheath under fluoroscopic guidance. Catheter length measured with the  0.018 guidewire. A Bentson guidewire was advanced. The wire was  positioned in the inferior vena cava. Micropuncture sheath removed  over the wire, and a 12 Persian 20 cm Alvin dilator was advanced over  the wire. A 14 Persian by 20 cm Schon XL catheter was then advanced  over the wire. The tip was positioned at the midright atrium under  fluoroscopy. A 2-0 retention suture was used to secure the catheter to  the skin. A sterile dressing was placed. There are no immediate  complications.       Impression    Impression:  Ultrasound/fluoroscopic guided placement of a 14 Armenian non-tunneled  central venous catheter.     PLAN:  Catheter ready for immediate use.     MR Cervical Spine w/o & w Contrast    Narrative    MR CERVICAL SPINE W/O & W CONTRAST, MR THORACIC SPINE W/O  & W CONTRAST 5/22/2019 8:30 PM    History: NMO    Comparison: MRI 2/28/2019 and 1/27/2019.    Technique: Sagittal T2- and T1-weighted images of the cervical and  thoracic spine, axial T2* gradient echo images of the cervical spine  and axial T2-weighted images from base of the head to upper lumbar  spine were obtained.  Following intravenous administration of  gadolinium, sagittal and axial T1-weighted images with fat saturation  were obtained.    Contrast: 7.5mL Gadavist    Findings:    Cervical: The cervical vertebrae appear normally aligned. There is  mild straining of the normal cervical lordosis. Multilevel cervical  disc height narrowing most prominent at C6-7. Degenerative opposing  endplate changes seen in the lower cervical spine from C6 5 to C7.  Most like deformities involving the inferior endplate of C5. There is  normal signal within and normal contour of the cervical spinal cord.     The findings on a level by level basis are as   follows:  C2-3: No spinal canal or neural foraminal narrowing.  C3-4:  No spinal canal or neural foraminal narrowing.  C4-5:  No spinal canal or neural foraminal narrowing.  C5-6:  Mild circumferential disc bulge, mild uncinate spurring and  facet arthropathy. No spinal canal or neural foraminal stenosis.  C6-7:  Small disc bulge and disc osteophyte complex result in mild  left neural foraminal narrowing. Mild spinal canal stenosis. No right  neural foraminal stenosis.  C7-T1:  No spinal canal or neural foraminal narrowing.  No abnormality of the paraspinal soft tissues. No abnormal enhancement  of the vertebra or within the thecal sac.    Thoracic: The external marker is at T7. The thoracic  vertebral column  appears in normal alignment. There is no loss of disk height at any  level. The spinal cord contour and signal pattern appear within normal  limits. There is no abnormal contrast enhancement within the thoracic  spinal cord, thecal sac or vertebral column.       Impression    Impression:   1. Cervical spine: No abnormal enhancement or cord abnormality. Mild  multilevel cervical spondylosis most prominent at C6-7.  2. Thoracic spine: No abnormal enhancement, cord abnormality, or  spinal canal or foraminal stenosis.    I have personally reviewed the examination and initial interpretation  and I agree with the findings.    BETH GUTIERREZ MD   MR Thoracic Spine w/o & w Contrast    Narrative    MR CERVICAL SPINE W/O & W CONTRAST, MR THORACIC SPINE W/O  & W CONTRAST 5/22/2019 8:30 PM    History: NMO    Comparison: MRI 2/28/2019 and 1/27/2019.    Technique: Sagittal T2- and T1-weighted images of the cervical and  thoracic spine, axial T2* gradient echo images of the cervical spine  and axial T2-weighted images from base of the head to upper lumbar  spine were obtained.  Following intravenous administration of  gadolinium, sagittal and axial T1-weighted images with fat saturation  were obtained.    Contrast: 7.5mL Gadavist    Findings:    Cervical: The cervical vertebrae appear normally aligned. There is  mild straining of the normal cervical lordosis. Multilevel cervical  disc height narrowing most prominent at C6-7. Degenerative opposing  endplate changes seen in the lower cervical spine from C6 5 to C7.  Most like deformities involving the inferior endplate of C5. There is  normal signal within and normal contour of the cervical spinal cord.     The findings on a level by level basis are as   follows:  C2-3: No spinal canal or neural foraminal narrowing.  C3-4:  No spinal canal or neural foraminal narrowing.  C4-5:  No spinal canal or neural foraminal narrowing.  C5-6:  Mild circumferential disc bulge,  mild uncinate spurring and  facet arthropathy. No spinal canal or neural foraminal stenosis.  C6-7:  Small disc bulge and disc osteophyte complex result in mild  left neural foraminal narrowing. Mild spinal canal stenosis. No right  neural foraminal stenosis.  C7-T1:  No spinal canal or neural foraminal narrowing.  No abnormality of the paraspinal soft tissues. No abnormal enhancement  of the vertebra or within the thecal sac.    Thoracic: The external marker is at T7. The thoracic vertebral column  appears in normal alignment. There is no loss of disk height at any  level. The spinal cord contour and signal pattern appear within normal  limits. There is no abnormal contrast enhancement within the thoracic  spinal cord, thecal sac or vertebral column.       Impression    Impression:   1. Cervical spine: No abnormal enhancement or cord abnormality. Mild  multilevel cervical spondylosis most prominent at C6-7.  2. Thoracic spine: No abnormal enhancement, cord abnormality, or  spinal canal or foraminal stenosis.    I have personally reviewed the examination and initial interpretation  and I agree with the findings.    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  Exam is notable for a right afferent pupillary defect, reduced right visual acuity, and mild weakness left upper and lower extremity.  She was admitted as a direct admit for Plex 5 rounds and high-dose steroid treatment.  Given her prior complications of pancreatitis and hallucinations with Solu-Medrol 1000 mg daily we will break her dose up into 500 mg twice a day.      # MOG-opathy   #Recurrent right optic neuritis  #Mild left-sided weakness  - Solu-Medrol 500 mg twice daily for 5 days  - GI prophylaxis with pantoprazole while on steroids  - Continue PCP prophylaxis with  Bactrim MWF  - Plasma exchange 0/5 first run today   - Transfusion medicine consult   - Tylenol for headaches  - Continue vitamin D 5000 units daily  - Continue calcium carbonate 500 mg daily  - S/p MRI C and T spine with and without contrast, stable and no new lesions   - Follow-up in clinic with Dr. Kaur today 930 AM      #Hypertension  Blood pressures have been elevated while on steroids to SBP 160s.  She has never been on any antihypertensive medications.  We will continue to monitor for now.     #Insomnia   Trouble with sleep while receiving steroids previously.    - Melatonin 1 mg nightly    #Hx of pancreatitis   Thought to be secondary to high dose steroids previously. No current abdominal pain   - Lipase tomorrow for baseline      FEN: Regular diet   PPx: SCDs, ambulation   Code: Full      Patient was seen and discussed with Dr. Barrington Dailey MD  Neurology PGY 2   [phone]

## 2019-05-23 NOTE — PLAN OF CARE
[id number]: VSS. Went to eye clinic. Apheresis done today, tolerated well. Tylenol for pain at catheter site at neck. Eating fine. Ambulating halls frequently. Melatonin for sleep. Continue POC.

## 2019-05-23 NOTE — DISCHARGE INSTRUCTIONS
Apheresis Blood Donor Center Post Instructions  You may feel tired after your procedure today.   Please call your doctor if you have:  bleeding that doesn t stop, fever, pain where a needle or tube (catheter) was placed, seizures, trouble breathing, red urine, nausea or vomiting, other health concerns.     If your symptoms are severe, call 911.  You have a temporary Central Venous Catheter:  Notify your doctor if you have had a fever, chills, shaking  or redness, warmth, swelling, drainage at the exit-site.  This could be a sign of infection.    The Apheresis/Blood Donor Center is open Monday-Friday 7:30 a.m. to 5 p.m.  The phone number is [phone].  A Transfusion Medicine physician can be reached after 5:00 p.m. weekdays and on weekends /Holidays by calling [phone], and asking for the physician on call.      Plasma exchange:  Thursday, 5/23/2019, you received albumin as part of your treatment (this is the most common), some of your clotting factors have been removed.  You body will replace these factors, but you could be at a slight risk for bleeding.  Please inform us if you have had any bleeding or a recent invasive procedure, such as a biopsy or surgery.    Certain medications that lower your blood pressure (ace inhibitors) such as Lisinopril are contraindicated while you are receiving plasma exchange.  Please inform us if you have started taking this medication during your plasma exchange series.

## 2019-05-24 LAB
ANION GAP SERPL CALCULATED.3IONS-SCNC: 7 MMOL/L (ref 3–14)
BUN SERPL-MCNC: 24 MG/DL (ref 7–30)
CALCIUM SERPL-MCNC: 8.7 MG/DL (ref 8.5–10.1)
CHLORIDE SERPL-SCNC: 106 MMOL/L (ref 94–109)
CO2 SERPL-SCNC: 27 MMOL/L (ref 20–32)
CREAT SERPL-MCNC: 0.62 MG/DL (ref 0.52–1.04)
ERYTHROCYTE [DISTWIDTH] IN BLOOD BY AUTOMATED COUNT: 13.4 % (ref 10–15)
GFR SERPL CREATININE-BSD FRML MDRD: >90 ML/MIN/{1.73_M2}
GLUCOSE SERPL-MCNC: 167 MG/DL (ref 70–99)
HCT VFR BLD AUTO: 44.4 % (ref 35–47)
HGB BLD-MCNC: 14.3 G/DL (ref 11.7–15.7)
LIPASE SERPL-CCNC: 97 U/L (ref 73–393)
MCH RBC QN AUTO: 30.8 PG (ref 26.5–33)
MCHC RBC AUTO-ENTMCNC: 32.2 G/DL (ref 31.5–36.5)
MCV RBC AUTO: 96 FL (ref 78–100)
PLATELET # BLD AUTO: 161 10E9/L (ref 150–450)
POTASSIUM SERPL-SCNC: 4.3 MMOL/L (ref 3.4–5.3)
RBC # BLD AUTO: 4.64 10E12/L (ref 3.8–5.2)
SODIUM SERPL-SCNC: 140 MMOL/L (ref 133–144)
WBC # BLD AUTO: 19.5 10E9/L (ref 4–11)

## 2019-05-24 PROCEDURE — 25000132 ZZH RX MED GY IP 250 OP 250 PS 637: Performed by: PSYCHIATRY & NEUROLOGY

## 2019-05-24 PROCEDURE — 25000128 H RX IP 250 OP 636: Performed by: PSYCHIATRY & NEUROLOGY

## 2019-05-24 PROCEDURE — 25000132 ZZH RX MED GY IP 250 OP 250 PS 637: Performed by: STUDENT IN AN ORGANIZED HEALTH CARE EDUCATION/TRAINING PROGRAM

## 2019-05-24 PROCEDURE — 80048 BASIC METABOLIC PNL TOTAL CA: CPT | Performed by: PSYCHIATRY & NEUROLOGY

## 2019-05-24 PROCEDURE — 83690 ASSAY OF LIPASE: CPT | Performed by: PSYCHIATRY & NEUROLOGY

## 2019-05-24 PROCEDURE — 25800030 ZZH RX IP 258 OP 636: Performed by: PSYCHIATRY & NEUROLOGY

## 2019-05-24 PROCEDURE — 85027 COMPLETE CBC AUTOMATED: CPT | Performed by: PSYCHIATRY & NEUROLOGY

## 2019-05-24 PROCEDURE — 36415 COLL VENOUS BLD VENIPUNCTURE: CPT | Performed by: PSYCHIATRY & NEUROLOGY

## 2019-05-24 PROCEDURE — 25000128 H RX IP 250 OP 636: Performed by: STUDENT IN AN ORGANIZED HEALTH CARE EDUCATION/TRAINING PROGRAM

## 2019-05-24 PROCEDURE — 12000001 ZZH R&B MED SURG/OB UMMC

## 2019-05-24 RX ORDER — ONDANSETRON 4 MG/1
4 TABLET, FILM COATED ORAL EVERY 6 HOURS PRN
Status: DISCONTINUED | OUTPATIENT
Start: 2019-05-24 | End: 2019-05-31 | Stop reason: HOSPADM

## 2019-05-24 RX ORDER — ONDANSETRON 2 MG/ML
4 INJECTION INTRAMUSCULAR; INTRAVENOUS EVERY 6 HOURS PRN
Status: DISCONTINUED | OUTPATIENT
Start: 2019-05-24 | End: 2019-05-31 | Stop reason: HOSPADM

## 2019-05-24 RX ORDER — ONDANSETRON 2 MG/ML
4 INJECTION INTRAMUSCULAR; INTRAVENOUS ONCE
Status: COMPLETED | OUTPATIENT
Start: 2019-05-24 | End: 2019-05-24

## 2019-05-24 RX ORDER — ONDANSETRON 4 MG/1
4 TABLET, FILM COATED ORAL EVERY 6 HOURS PRN
Status: DISCONTINUED | OUTPATIENT
Start: 2019-05-24 | End: 2019-05-24

## 2019-05-24 RX ADMIN — CHOLECALCIFEROL CAP 125 MCG (5000 UNIT) 5000 UNITS: 125 CAP at 08:42

## 2019-05-24 RX ADMIN — SULFAMETHOXAZOLE AND TRIMETHOPRIM 1 TABLET: 800; 160 TABLET ORAL at 08:47

## 2019-05-24 RX ADMIN — ONDANSETRON 4 MG: 2 INJECTION INTRAMUSCULAR; INTRAVENOUS at 14:48

## 2019-05-24 RX ADMIN — ACETAMINOPHEN 975 MG: 325 TABLET, FILM COATED ORAL at 21:54

## 2019-05-24 RX ADMIN — Medication 3 ML: at 15:44

## 2019-05-24 RX ADMIN — Medication 1 MG: at 03:39

## 2019-05-24 RX ADMIN — CALCIUM 500 MG: 500 TABLET ORAL at 08:47

## 2019-05-24 RX ADMIN — Medication 1 MG: at 21:54

## 2019-05-24 RX ADMIN — SODIUM CHLORIDE 500 MG: 9 INJECTION, SOLUTION INTRAVENOUS at 17:46

## 2019-05-24 RX ADMIN — Medication 5 ML: at 21:59

## 2019-05-24 RX ADMIN — PANTOPRAZOLE SODIUM 40 MG: 40 TABLET, DELAYED RELEASE ORAL at 08:42

## 2019-05-24 RX ADMIN — ACETAMINOPHEN 975 MG: 325 TABLET, FILM COATED ORAL at 11:54

## 2019-05-24 RX ADMIN — SODIUM CHLORIDE 500 MG: 9 INJECTION, SOLUTION INTRAVENOUS at 06:38

## 2019-05-24 RX ADMIN — Medication 3 ML: at 15:45

## 2019-05-24 ASSESSMENT — MIFFLIN-ST. JEOR: SCORE: 1402.38

## 2019-05-24 ASSESSMENT — PAIN DESCRIPTION - DESCRIPTORS
DESCRIPTORS: HEADACHE
DESCRIPTORS: ACHING;HEADACHE;PRESSURE

## 2019-05-24 ASSESSMENT — ACTIVITIES OF DAILY LIVING (ADL)
ADLS_ACUITY_SCORE: 9

## 2019-05-24 NOTE — PLAN OF CARE
[id number]     Alert and oriented x4, VSS on RA. R internal jugular for apheresis. Declined interventions to pain. Melatonin given per request. Continue plan of care.

## 2019-05-24 NOTE — PLAN OF CARE
: C/O pressure under bilateral eyes R>L and also a HA that lead to nausea. She also reports she is no longer able to see the color red (this was something she was tested for a few days ago), and her face feels swollen and flushed. MD notified, sea bands and Zofran IV given. Pt reports nausea and pain has improved. Pt spoke with her  and he reminded her that she had similar symptoms a few weeks ago and related it to changes weather/pressure system.     1530-1930: Reports HA and nausea have improved. Ate an orange for dinner, but otherwise hasn't been eating much over the past few days. Ambulated in the halls independently.

## 2019-05-24 NOTE — PROGRESS NOTES
"[facility], [place]   Neurology Daily Note  Kerrie Patel  [id number]  05/24/2019    Subjective: Follow-up with Dr. Kaur yesterday with stable eye examination please see his note for more details.  First of 5 plasma exchange runs yesterday, which she tolerated well.    Objective   /80 (BP Location: Left arm)   Pulse 70   Temp 97  F (36.1  C) (Oral)   Resp 18   Ht 1.55 m (5' 1.02\")   Wt 83.9 kg (184 lb 15.5 oz)   SpO2 94%   BMI 34.92 kg/m    General: Sitting in bed, appears comfortable.   HEENT: Normocephalic atraumatic   Cardiac: RRR  Chest: No respiratory distress, clear to auscultation.   Abdomen: No abdominal discomfort   Extremities: No bilateral lower extremity edema   Skin:  No lesions.   Psych:  Mood and affect stable.     Neuro:  Mental status: Alert, attentive, oriented. Follows commands. Speech fluent and comprehension intact.   Cranial nerves:  Eyes conjugate, visual fields intact, right afferent pupillary defect, right eye 20/200 and left eye 20/25 on bedside testing, EOMI, face symmetric, facial sensation intact, shoulder shrug strong, tongue and uvula midline. No dysarthria.   Motor: Tone normal. No atrophy. Left pronator drift. RUE and RLE 5/5. LLE HF 5-/5, KF 5-/5, KE 5-/5, dorsiflexion and plantarflexion 5/5. LUE deltoid 5-/5, biceps 5-/5, triceps 5-/5, wrist extensor 5/5,  5/5.   Reflexes: Toes flexor   Sensory: Intact to light touch throughout   Coordination: FNF and HTS without dysmetria.   Gait: Not tested     Investigations    CMP   Recent Labs   Lab 05/24/19  0552 05/23/19  0643 05/22/19  1357    139 140   POTASSIUM 4.3 4.0 4.1   CHLORIDE 106 107 107   CO2 27 26 28   ANIONGAP 7 6 6   * 161* 128*   BUN 24 21 15   CR 0.62 0.63 0.63   GFRESTIMATED >90 >90 >90   GFRESTBLACK >90 >90 >90   LISA 8.7 8.9 9.1   MAG  --   --  2.6*   PROTTOTAL  --   --  6.5*   ALBUMIN  --   --  3.5   BILITOTAL  --   --  0.2   ALKPHOS  --   --  59   AST  --   --  13 "   ALT  --   --  31      CBC   Recent Labs   Lab 05/24/19  0552 05/23/19  0643 05/22/19  1357   WBC 19.5* 14.8* 12.8*   RBC 4.64 4.65 4.92   HGB 14.3 14.4 14.9   HCT 44.4 44.9 46.7   MCV 96 97 95   MCH 30.8 31.0 30.3   MCHC 32.2 32.1 31.9   RDW 13.4 13.5 13.5    184 208     Radiological Data  Recent Results (from the past 48 hour(s))   IR CVC Non Tunnel Placement    Narrative    Procedures 5/22/2019:  1. Ultrasound guidance for vascular access.  2. Non-tunneled right internal jugular central venous catheter  placement.    History: Patient needs catheter for Apheresis    Comparison: None    Attending: JUAN Marinelli MD    Fellow: SHABNAM Monk MD    Resident: Justine Beauchamp MD    Medications:   No sedation was administered. Approximately 4 cc 1% lidocaine was used  for local anesthesia.    Fluoroscopy time: 4.5 minutes    Contrast: None    Findings/procedure:    Prior to the procedure, consent was obtained from patient's daughter  over the phone.     Patient was placed on the fluoroscopy table. Preprocedural timeout was  performed. Limited internal jugular vein ultrasound demonstrated a  patent right internal jugular vein. An ultrasound image of the patent  vein was saved to the patient's record. 1% lidocaine was instilled  into the overlying soft tissues. A small skin nick was made with 11  blade scalpel. Under ultrasound guidance, right internal jugular  venotomy was made with a micropuncture needle. Ultrasound image  documenting jugular patency and needle venotomy was saved in the  patient's record.    Micropuncture needle exchanged over guidewire for the micropuncture  sheath under fluoroscopic guidance. Catheter length measured with the  0.018 guidewire. A Bentson guidewire was advanced. The wire was  positioned in the inferior vena cava. Micropuncture sheath removed  over the wire, and a 12 Indonesian 20 cm Alvin dilator was advanced over  the wire. A 14 Indonesian by 20 cm Schon XL catheter was then advanced  over  the wire. The tip was positioned at the midright atrium under  fluoroscopy. A 2-0 retention suture was used to secure the catheter to  the skin. A sterile dressing was placed. There are no immediate  complications.      Impression    Impression:  Ultrasound/fluoroscopic guided placement of a 14 Taj[place] non-tunneled  central venous catheter.     PLAN:  Catheter ready for immediate use.     MR Cervical Spine w/o & w Contrast    Narrative    MR CERVICAL SPINE W/O & W CONTRAST, MR THORACIC SPINE W/O  & W CONTRAST 5/22/2019 8:30 PM    History: NMO    Comparison: MRI 2/28/2019 and 1/27/2019.    Technique: Sagittal T2- and T1-weighted images of the cervical and  thoracic spine, axial T2* gradient echo images of the cervical spine  and axial T2-weighted images from base of the head to upper lumbar  spine were obtained.  Following intravenous administration of  gadolinium, sagittal and axial T1-weighted images with fat saturation  were obtained.    Contrast: 7.5mL Gadavist    Findings:    Cervical: The cervical vertebrae appear normally aligned. There is  mild straining of the normal cervical lordosis. Multilevel cervical  disc height narrowing most prominent at C6-7. Degenerative opposing  endplate changes seen in the lower cervical spine from C6 5 to C7.  Most like deformities involving the inferior endplate of C5. There is  normal signal within and normal contour of the cervical spinal cord.     The findings on a level by level basis are as   follows:  C2-3: No spinal canal or neural foraminal narrowing.  C3-4:  No spinal canal or neural foraminal narrowing.  C4-5:  No spinal canal or neural foraminal narrowing.  C5-6:  Mild circumferential disc bulge, mild uncinate spurring and  facet arthropathy. No spinal canal or neural foraminal stenosis.  C6-7:  Small disc bulge and disc osteophyte complex result in mild  left neural foraminal narrowing. Mild spinal canal stenosis. No right  neural foraminal stenosis.  C7-T1:  No  spinal canal or neural foraminal narrowing.  No abnormality of the paraspinal soft tissues. No abnormal enhancement  of the vertebra or within the thecal sac.    Thoracic: The external marker is at T7. The thoracic vertebral column  appears in normal alignment. There is no loss of disk height at any  level. The spinal cord contour and signal pattern appear within normal  limits. There is no abnormal contrast enhancement within the thoracic  spinal cord, thecal sac or vertebral column.       Impression    Impression:   1. Cervical spine: No abnormal enhancement or cord abnormality. Mild  multilevel cervical spondylosis most prominent at C6-7.  2. Thoracic spine: No abnormal enhancement, cord abnormality, or  spinal canal or foraminal stenosis.    I have personally reviewed the examination and initial interpretation  and I agree with the findings.    BETH GUTIERREZ MD   MR Thoracic Spine w/o & w Contrast    Narrative    MR CERVICAL SPINE W/O & W CONTRAST, MR THORACIC SPINE W/O  & W CONTRAST 5/22/2019 8:30 PM    History: NMO    Comparison: MRI 2/28/2019 and 1/27/2019.    Technique: Sagittal T2- and T1-weighted images of the cervical and  thoracic spine, axial T2* gradient echo images of the cervical spine  and axial T2-weighted images from base of the head to upper lumbar  spine were obtained.  Following intravenous administration of  gadolinium, sagittal and axial T1-weighted images with fat saturation  were obtained.    Contrast: 7.5mL Gadavist    Findings:    Cervical: The cervical vertebrae appear normally aligned. There is  mild straining of the normal cervical lordosis. Multilevel cervical  disc height narrowing most prominent at C6-7. Degenerative opposing  endplate changes seen in the lower cervical spine from C6 5 to C7.  Most like deformities involving the inferior endplate of C5. There is  normal signal within and normal contour of the cervical spinal cord.     The findings on a level by level basis are as    follows:  C2-3: No spinal canal or neural foraminal narrowing.  C3-4:  No spinal canal or neural foraminal narrowing.  C4-5:  No spinal canal or neural foraminal narrowing.  C5-6:  Mild circumferential disc bulge, mild uncinate spurring and  facet arthropathy. No spinal canal or neural foraminal stenosis.  C6-7:  Small disc bulge and disc osteophyte complex result in mild  left neural foraminal narrowing. Mild spinal canal stenosis. No right  neural foraminal stenosis.  C7-T1:  No spinal canal or neural foraminal narrowing.  No abnormality of the paraspinal soft tissues. No abnormal enhancement  of the vertebra or within the thecal sac.    Thoracic: The external marker is at T7. The thoracic vertebral column  appears in normal alignment. There is no loss of disk height at any  level. The spinal cord contour and signal pattern appear within normal  limits. There is no abnormal contrast enhancement within the thoracic  spinal cord, thecal sac or vertebral column.       Impression    Impression:   1. Cervical spine: No abnormal enhancement or cord abnormality. Mild  multilevel cervical spondylosis most prominent at C6-7.  2. Thoracic spine: No abnormal enhancement, cord abnormality, or  spinal canal or foraminal stenosis.    I have personally reviewed the examination and initial interpretation  and I agree with the findings.    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  Exam is notable for a right afferent pupillary defect, reduced right visual acuity, and mild weakness left upper and lower extremity.  She was admitted as a direct admit for Plex 5 rounds and high-dose steroid treatment.  Given her prior complications of pancreatitis and hallucinations with Solu-Medrol 1000 mg daily we will break her dose up into 500 mg twice a  day.      # MOG-opathy   #Recurrent right optic neuritis  #Mild left-sided weakness  - Solu-Medrol 500 mg twice daily for 5 days  - GI prophylaxis with pantoprazole while on steroids  - Continue PCP prophylaxis with Bactrim MWF  - Plasma exchange 1/5   - Transfusion medicine consult   - Tylenol for headaches  - Continue vitamin D 5000 units daily  - Continue calcium carbonate 500 mg daily  - S/p MRI C and T spine with and without contrast, stable and no new lesions      # Leukocytosis   Suspected secondary to high dose steroids. Will monitor for signs and symptoms of infection.     #Hypertension  Blood pressures have been elevated while on steroids to SBP 160s.  She has never been on any antihypertensive medications.  We will continue to monitor for now.     #Insomnia   Trouble with sleep while receiving steroids previously.    - Melatonin 1 mg nightly    #Hx of pancreatitis   Thought to be secondary to high dose steroids previously. No current abdominal pain   Lipase 97 baseline.   - Monitor for abdominal pain      FEN: Regular diet   PPx: SCDs, ambulation   Code: Full      Patient was seen and discussed with Dr. Barrington Dailey MD  Neurology PGY 2   [phone]

## 2019-05-25 LAB
ANION GAP SERPL CALCULATED.3IONS-SCNC: 7 MMOL/L (ref 3–14)
BUN SERPL-MCNC: 28 MG/DL (ref 7–30)
CALCIUM SERPL-MCNC: 8.6 MG/DL (ref 8.5–10.1)
CHLORIDE SERPL-SCNC: 106 MMOL/L (ref 94–109)
CO2 SERPL-SCNC: 26 MMOL/L (ref 20–32)
CREAT SERPL-MCNC: 0.69 MG/DL (ref 0.52–1.04)
ERYTHROCYTE [DISTWIDTH] IN BLOOD BY AUTOMATED COUNT: 13.4 % (ref 10–15)
FIBRINOGEN PPP-MCNC: 109 MG/DL (ref 200–420)
GFR SERPL CREATININE-BSD FRML MDRD: >90 ML/MIN/{1.73_M2}
GLUCOSE SERPL-MCNC: 175 MG/DL (ref 70–99)
HCT VFR BLD AUTO: 45.3 % (ref 35–47)
HGB BLD-MCNC: 14.8 G/DL (ref 11.7–15.7)
MCH RBC QN AUTO: 30.5 PG (ref 26.5–33)
MCHC RBC AUTO-ENTMCNC: 32.7 G/DL (ref 31.5–36.5)
MCV RBC AUTO: 93 FL (ref 78–100)
PLATELET # BLD AUTO: 155 10E9/L (ref 150–450)
POTASSIUM SERPL-SCNC: 4.2 MMOL/L (ref 3.4–5.3)
RBC # BLD AUTO: 4.85 10E12/L (ref 3.8–5.2)
SODIUM SERPL-SCNC: 140 MMOL/L (ref 133–144)
WBC # BLD AUTO: 17.4 10E9/L (ref 4–11)

## 2019-05-25 PROCEDURE — 25000128 H RX IP 250 OP 636: Performed by: PATHOLOGY

## 2019-05-25 PROCEDURE — 25000125 ZZHC RX 250: Performed by: PATHOLOGY

## 2019-05-25 PROCEDURE — 36415 COLL VENOUS BLD VENIPUNCTURE: CPT | Performed by: PSYCHIATRY & NEUROLOGY

## 2019-05-25 PROCEDURE — 25000132 ZZH RX MED GY IP 250 OP 250 PS 637: Performed by: PSYCHIATRY & NEUROLOGY

## 2019-05-25 PROCEDURE — 25000132 ZZH RX MED GY IP 250 OP 250 PS 637: Performed by: STUDENT IN AN ORGANIZED HEALTH CARE EDUCATION/TRAINING PROGRAM

## 2019-05-25 PROCEDURE — 25000128 H RX IP 250 OP 636: Performed by: PSYCHIATRY & NEUROLOGY

## 2019-05-25 PROCEDURE — 85384 FIBRINOGEN ACTIVITY: CPT | Performed by: PATHOLOGY

## 2019-05-25 PROCEDURE — P9041 ALBUMIN (HUMAN),5%, 50ML: HCPCS | Performed by: PATHOLOGY

## 2019-05-25 PROCEDURE — 25800030 ZZH RX IP 258 OP 636: Performed by: PSYCHIATRY & NEUROLOGY

## 2019-05-25 PROCEDURE — 85027 COMPLETE CBC AUTOMATED: CPT | Performed by: PSYCHIATRY & NEUROLOGY

## 2019-05-25 PROCEDURE — 36514 APHERESIS PLASMA: CPT

## 2019-05-25 PROCEDURE — 25000128 H RX IP 250 OP 636: Performed by: STUDENT IN AN ORGANIZED HEALTH CARE EDUCATION/TRAINING PROGRAM

## 2019-05-25 PROCEDURE — 80048 BASIC METABOLIC PNL TOTAL CA: CPT | Performed by: PSYCHIATRY & NEUROLOGY

## 2019-05-25 PROCEDURE — 12000001 ZZH R&B MED SURG/OB UMMC

## 2019-05-25 RX ORDER — PROCHLORPERAZINE MALEATE 5 MG
5 TABLET ORAL EVERY 6 HOURS PRN
Status: DISCONTINUED | OUTPATIENT
Start: 2019-05-25 | End: 2019-05-31 | Stop reason: HOSPADM

## 2019-05-25 RX ORDER — DIPHENHYDRAMINE HCL 25 MG
25 CAPSULE ORAL EVERY 6 HOURS PRN
Status: DISCONTINUED | OUTPATIENT
Start: 2019-05-25 | End: 2019-05-31 | Stop reason: HOSPADM

## 2019-05-25 RX ORDER — TRAMADOL HYDROCHLORIDE 50 MG/1
50 TABLET ORAL EVERY 6 HOURS PRN
Status: DISCONTINUED | OUTPATIENT
Start: 2019-05-25 | End: 2019-05-31 | Stop reason: HOSPADM

## 2019-05-25 RX ORDER — HEPARIN SODIUM (PORCINE) LOCK FLUSH IV SOLN 100 UNIT/ML 100 UNIT/ML
3 SOLUTION INTRAVENOUS ONCE
Status: COMPLETED | OUTPATIENT
Start: 2019-05-25 | End: 2019-05-25

## 2019-05-25 RX ORDER — DIPHENHYDRAMINE HYDROCHLORIDE 50 MG/ML
50 INJECTION INTRAMUSCULAR; INTRAVENOUS
Status: CANCELLED | OUTPATIENT
Start: 2019-05-25

## 2019-05-25 RX ORDER — ALBUMIN HUMAN 25 %
2250 INTRAVENOUS SOLUTION INTRAVENOUS
Status: COMPLETED | OUTPATIENT
Start: 2019-05-25 | End: 2019-05-25

## 2019-05-25 RX ORDER — CALCIUM GLUCONATE 100 MG/ML
AMPUL (ML) INTRAVENOUS
Status: COMPLETED | OUTPATIENT
Start: 2019-05-25 | End: 2019-05-25

## 2019-05-25 RX ADMIN — DIPHENHYDRAMINE HYDROCHLORIDE 25 MG: 25 CAPSULE ORAL at 20:50

## 2019-05-25 RX ADMIN — PANTOPRAZOLE SODIUM 40 MG: 40 TABLET, DELAYED RELEASE ORAL at 07:38

## 2019-05-25 RX ADMIN — ONDANSETRON 4 MG: 2 INJECTION INTRAMUSCULAR; INTRAVENOUS at 06:06

## 2019-05-25 RX ADMIN — CALCIUM 500 MG: 500 TABLET ORAL at 07:38

## 2019-05-25 RX ADMIN — ANTICOAGULANT CITRATE DEXTROSE SOLUTION FORMULA A 581 ML: 12.25; 11; 3.65 SOLUTION INTRAVENOUS at 08:42

## 2019-05-25 RX ADMIN — ACETAMINOPHEN 975 MG: 325 TABLET, FILM COATED ORAL at 17:56

## 2019-05-25 RX ADMIN — SODIUM CHLORIDE 500 MG: 9 INJECTION, SOLUTION INTRAVENOUS at 17:52

## 2019-05-25 RX ADMIN — ALBUMIN HUMAN 2250 ML: 0.05 INJECTION, SOLUTION INTRAVENOUS at 08:42

## 2019-05-25 RX ADMIN — Medication 3 ML: at 10:06

## 2019-05-25 RX ADMIN — CHOLECALCIFEROL CAP 125 MCG (5000 UNIT) 5000 UNITS: 125 CAP at 07:38

## 2019-05-25 RX ADMIN — CALCIUM GLUCONATE 2.5 G: 98 INJECTION, SOLUTION INTRAVENOUS at 08:45

## 2019-05-25 RX ADMIN — SODIUM CHLORIDE 500 MG: 9 INJECTION, SOLUTION INTRAVENOUS at 06:09

## 2019-05-25 ASSESSMENT — ACTIVITIES OF DAILY LIVING (ADL)
ADLS_ACUITY_SCORE: 9

## 2019-05-25 ASSESSMENT — MIFFLIN-ST. JEOR: SCORE: 1399.2

## 2019-05-25 NOTE — PROCEDURES
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 Received TPE x 5 at an [facility] in [place] on February. Was on prednisone taper when she noticed right eye pain about 1 week ago. Couple days later, she noticed decreased vision of the right eye. Her prednisone was increased back up to 20mg but symptoms were not improving. Recent MRI shows subtle enhancement of the right optic nerve compatible with a history of optic neuritis. She is also status post rituximab infusions (March, April). Most recent MOG titer 1:20.    Today she had 2/5 TPE. She tolerated the procedure well. She mentioned she had a bad headache last night and some nausea this morning when she woke up. She believes some of the vision symptoms are improving.      I noticed the fibrinogen level is low - from 296 (2 days ago) to 109. Since we had reviewed the potential risks and benefits of blood transfusion during the consent, I briefly discussed with Kerrie and her  on the use plasma as part of the replacement fluid for her next TPE. All of their questions were answered.        - Next 3/5 TPE Monday. Half donor plasma and half 5% albumin.  Will monitor INR and fibrinogen for recovery of coagulapathy.    - CVC vascular access  - ACD-A for anticoagulation. Will give calcium gluconate in the return to offset effects and monitor iCa.  - Do not start ACE inhibitors throughout the duration of the TPE series as these have been associated with reactions during apheresis.    - Notify the Transfusion Medicine physician of any  upcoming procedures, surgeries, or biopsies as TPE with albumin replacement will affect coagulation factor levels.  - Consider the impact of TPE on laboratory studies and medication levels.  IVIG and other immune therapies such as rituximab should NOT be given right prior to TPE but rather after due to removal.            Chief Complaint:   Headache and visual change.         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 Received TPE x 5 at an [facility] in [place] on February. Was on prednisone taper when she noticed right eye pain about 1 week ago. Couple days later, she noticed decreased vision of the right eye. Her prednisone was increased back up to 20mg but symptoms were not improving. Recent MRI shows subtle enhancement of the right optic nerve compatible with a history of optic neuritis. She is also status post rituximab infusions (March, April). Most recent MOG titer 1:20.                Past Medical History:     Past Medical History:   Diagnosis Date     Diffuse cystic mastopathy of breast     No Comments Provided     Family history of diabetes mellitus     No Comments Provided     Gastro-esophageal reflux disease without esophagitis     No Comments Provided     Pain in thoracic spine     No Comments Provided             Past Surgical History:     Past Surgical History:   Procedure Laterality Date      SECTION      96, 99     CHOLECYSTECTOMY      2003     OTHER SURGICAL HISTORY      2012,UKM960,PREMALIG/BENIGN SKIN LESION EXCISION,BREAST LESION     OTHER SURGICAL HISTORY      2017,12558.0,MD TLH W TUBES/OVAR 250 G OR LESS              Social History:     Social History     Tobacco Use     Smoking status: Never Smoker     Smokeless tobacco: Never Used   Substance Use Topics     Alcohol use: No     Alcohol/week: 0.0 oz     Comment: rare              Family History:   This patient has no significant family history            Allergies:     Allergies   Allergen Reactions     Amoxicillin Nausea and Vomiting     Lactose      Other reaction(s): GI Bleeding             Medications:     Current Facility-Administered Medications   Medication     acetaminophen (TYLENOL) tablet 975 mg     bisacodyl (DULCOLAX) Suppository 10 mg     calcium carbonate 500 mg (elemental) (OSCAL;OYSTER SHELL CALCIUM) tablet 500 mg     cholecalciferol (VITAMIN D3) 5000 units (125 mcg) capsule 5,000 Units     glucose gel 15-30 g    Or     dextrose 50 % injection 25-50 mL    Or     glucagon injection 1 mg     heparin 100 UNIT/ML injection 3 mL     heparin 100 UNIT/ML injection 3 mL     heparin 100 UNIT/ML injection 5 mL     heparin 100 UNIT/ML injection 5 mL     lidocaine (LMX4) cream     lidocaine 1 % 0.1-1 mL     magnesium sulfate 4 g in 100 mL sterile water (premade)     melatonin tablet 1 mg     melatonin tablet 1 mg     methylPREDNISolone sodium succinate (solu-MEDROL) 500 mg in sodium chloride 0.9 % 100 mL intermittent infusion     naloxone (NARCAN) injection 0.1-0.4 mg     ondansetron (ZOFRAN) injection 4 mg    Or     ondansetron (ZOFRAN) tablet 4 mg     pantoprazole (PROTONIX) EC tablet 40 mg     polyethylene glycol (MIRALAX/GLYCOLAX) Packet 17 g     potassium chloride (KLOR-CON) Packet 20-40 mEq     potassium chloride 10 mEq in 100 mL intermittent infusion with 10 mg lidocaine     potassium chloride 10 mEq in 100 mL sterile water intermittent infusion (premix)     potassium chloride 20 mEq in 50 mL intermittent infusion     potassium chloride ER (K-DUR/KLOR-CON M) CR tablet 20-40 mEq     prochlorperazine (COMPAZINE) tablet 5 mg     senna-docusate (SENOKOT-S/PERICOLACE) 8.6-50 MG per tablet 1 tablet    Or     senna-docusate (SENOKOT-S/PERICOLACE) 8.6-50 MG per tablet 2 tablet     sodium chloride (PF) 0.9% PF flush 10 mL     sodium chloride (PF) 0.9% PF flush 10-20 mL      "sulfamethoxazole-trimethoprim (BACTRIM DS/SEPTRA DS) 800-160 MG per tablet 1 tablet     traMADol (ULTRAM) tablet 50 mg             Review of Systems:   CONSTITUTIONAL: NEGATIVE for fever, chills, change in weight  ENT/MOUTH: NEGATIVE for ear, mouth and throat problems  RESP: NEGATIVE for significant cough or SOB  CV: NEGATIVE for chest pain, palpitations or peripheral edema           Vital Signs:   /68   Pulse 80   Temp 98  F (36.7  C) (Oral)   Resp 18   Ht 1.55 m (5' 1.02\")   Wt 83.1 kg (183 lb 4.8 oz)   SpO2 94%   BMI 34.61 kg/m                Data:     ROUTINE LABS (Last four results)  CMP  Recent Labs   Lab 05/25/19  0604 05/24/19  0552 05/23/19  0643 05/22/19  1357    140 139 140   POTASSIUM 4.2 4.3 4.0 4.1   CHLORIDE 106 106 107 107   CO2 26 27 26 28   ANIONGAP 7 7 6 6   * 167* 161* 128*   BUN 28 24 21 15   CR 0.69 0.62 0.63 0.63   GFRESTIMATED >90 >90 >90 >90   GFRESTBLACK >90 >90 >90 >90   LISA 8.6 8.7 8.9 9.1   MAG  --   --   --  2.6*   PROTTOTAL  --   --   --  6.5*   ALBUMIN  --   --   --  3.5   BILITOTAL  --   --   --  0.2   ALKPHOS  --   --   --  59   AST  --   --   --  13   ALT  --   --   --  31     CBC  Recent Labs   Lab 05/25/19  0604 05/24/19  0552 05/23/19  0643 05/22/19  1357   WBC 17.4* 19.5* 14.8* 12.8*   RBC 4.85 4.64 4.65 4.92   HGB 14.8 14.3 14.4 14.9   HCT 45.3 44.4 44.9 46.7   MCV 93 96 97 95   MCH 30.5 30.8 31.0 30.3   MCHC 32.7 32.2 32.1 31.9   RDW 13.4 13.4 13.5 13.5    161 184 208     INR  Recent Labs   Lab 05/23/19  1415   INR 1.10                Procedure Summary:   A single plasma volume TPE was performed with 5% albumin replacement on a Spectra Optia cell separator.  The tunneled catheter was used for access.  ACD-A was used for anticoagulation.  To offset the effects of the citrate, calcium gluconate was given in the return line.  The patient's vital signs were stable throughout the TPE.  The patient tolerated the procedure well.    Sony Rivero He, " MD  Fellow, Transfusion Medicine  Pager: [phone]

## 2019-05-25 NOTE — PROGRESS NOTES
"[facility], [place]   Neurology Daily Note  Kerrie Patel  [id number]  05/25/2019    Subjective: Yesterday afternoon and evening headache and nausea treated with ice packs, acetaminophen and zofran.  This morning nausea treated with Zofran, but resolution of headache.  Looking forward to her  visiting and going outside.  Receiving second round of plexus this morning.    Objective   /68   Pulse 80   Temp 98  F (36.7  C) (Oral)   Resp 18   Ht 1.55 m (5' 1.02\")   Wt 83.1 kg (183 lb 4.8 oz)   SpO2 94%   BMI 34.61 kg/m    General: Sitting in bed, appears comfortable.   HEENT: Normocephalic atraumatic   Cardiac: RRR  Chest: No respiratory distress, clear to auscultation.   Abdomen: No abdominal discomfort   Extremities: No bilateral lower extremity edema   Skin:  No lesions.   Psych:  Mood and affect stable.     Neuro:  Mental status: Alert, attentive, oriented. Follows commands. Speech fluent and comprehension intact.   Cranial nerves:  Eyes conjugate, visual fields intact, right afferent pupillary defect, right eye 20/100 and left eye 20/20 on bedside testing, EOMI, face symmetric, facial sensation intact, shoulder shrug strong, tongue and uvula midline. No dysarthria.   Motor: Tone normal. No atrophy. Left pronator drift otherwise strength testing 5 out of 5 throughout.   Reflexes: Toes flexor   Sensory: Intact to light touch throughout   Coordination: FNF and HTS without dysmetria.   Gait: Not tested     Investigations    CMP   Recent Labs   Lab 05/25/19  0604 05/24/19  0552 05/23/19  0643 05/22/19  1357    140 139 140   POTASSIUM 4.2 4.3 4.0 4.1   CHLORIDE 106 106 107 107   CO2 26 27 26 28   ANIONGAP 7 7 6 6   * 167* 161* 128*   BUN 28 24 21 15   CR 0.69 0.62 0.63 0.63   GFRESTIMATED >90 >90 >90 >90   GFRESTBLACK >90 >90 >90 >90   LISA 8.6 8.7 8.9 9.1   MAG  --   --   --  2.6*   PROTTOTAL  --   --   --  6.5*   ALBUMIN  --   --   --  3.5   BILITOTAL  --   --   " --  0.2   ALKPHOS  --   --   --  59   AST  --   --   --  13   ALT  --   --   --  31      CBC   Recent Labs   Lab 05/25/19  0604 05/24/19  0552 05/23/19  0643 05/22/19  1357   WBC 17.4* 19.5* 14.8* 12.8*   RBC 4.85 4.64 4.65 4.92   HGB 14.8 14.3 14.4 14.9   HCT 45.3 44.4 44.9 46.7   MCV 93 96 97 95   MCH 30.5 30.8 31.0 30.3   MCHC 32.7 32.2 32.1 31.9   RDW 13.4 13.4 13.5 13.5    161 184 208     Assessment and Plan   49 year old female 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  Exam is notable for a right afferent pupillary defect, reduced right visual acuity, and mild weakness left upper and lower extremity.  She was admitted as a direct admit for Plex 5 rounds and high-dose steroid treatment.  Given her prior complications of pancreatitis and hallucinations with Solu-Medrol 1000 mg daily we will break her dose up into 500 mg twice a day.       # MOG-opathy   #Recurrent right optic neuritis  #Mild left-sided weakness  - Solu-Medrol 500 mg twice daily for 5 days  - GI prophylaxis with pantoprazole while on steroids  - Continue PCP prophylaxis with Bactrim MWF  - Plasma exchange 2/5 with tx today   - Transfusion medicine consult   - Tylenol for headaches  - Tramadol 50 mg every 6 hours PRN for severe headache  - Zofran and Compazine for nausea  - Continue vitamin D 5000 units daily  - Continue calcium carbonate 500 mg daily  - S/p MRI C and T spine with and without contrast, stable and no new lesions      # Leukocytosis   Suspected secondary to high dose steroids. Will monitor for signs and symptoms of infection.     #Hyperglycemia secondary to steroids  Blood glucose in 160-170 range with steroids.  No history of diabetes.  No sliding scale at this time  -Continue to monitor    #Hypertension  Blood pressures have been elevated while on steroids to SBP 160s.  She has never been on any  antihypertensive medications.  We will continue to monitor for now.     #Insomnia   Trouble with sleep while receiving steroids previously.    - Melatonin 1 mg nightly    #Hx of pancreatitis   Thought to be secondary to high dose steroids previously. No current abdominal pain   Lipase 97 baseline.   - Monitor for abdominal pain      FEN: Regular diet   PPx: SCDs, ambulation   Code: Full      Patient was seen and discussed with Dr. Barrington Dailey MD  Neurology PGY 2   [phone]

## 2019-05-25 NOTE — PLAN OF CARE
VS stable, afebrile. Pt c/o headache before bed - tylenol administered PRN, pt rested comfortably through night. Pt denied SOB/v, however was nauseas as soon as she woke up - IV zofran administered with relief. Solumedrol administered as scheduled. CVC heparin locked. No significant events this shift. Plan for apheresis this AM. Continue plan of care.

## 2019-05-25 NOTE — PLAN OF CARE
: #2 PLEX done today without problems. C/O mild nausea but declined antiemetics. Ate 1 banana and a few bites of rice. Showered and walked outside with her .

## 2019-05-26 LAB
ALBUMIN SERPL-MCNC: 4.1 G/DL (ref 3.4–5)
ALP SERPL-CCNC: 14 U/L (ref 40–150)
ALT SERPL W P-5'-P-CCNC: 16 U/L (ref 0–50)
AST SERPL W P-5'-P-CCNC: 6 U/L (ref 0–45)
BILIRUB DIRECT SERPL-MCNC: 0.2 MG/DL (ref 0–0.2)
BILIRUB SERPL-MCNC: 0.7 MG/DL (ref 0.2–1.3)
LIPASE SERPL-CCNC: 677 U/L (ref 73–393)
PROT SERPL-MCNC: 5.4 G/DL (ref 6.8–8.8)
TRIGL SERPL-MCNC: 123 MG/DL

## 2019-05-26 PROCEDURE — 25000132 ZZH RX MED GY IP 250 OP 250 PS 637: Performed by: PSYCHIATRY & NEUROLOGY

## 2019-05-26 PROCEDURE — 80076 HEPATIC FUNCTION PANEL: CPT | Performed by: STUDENT IN AN ORGANIZED HEALTH CARE EDUCATION/TRAINING PROGRAM

## 2019-05-26 PROCEDURE — 83690 ASSAY OF LIPASE: CPT | Performed by: STUDENT IN AN ORGANIZED HEALTH CARE EDUCATION/TRAINING PROGRAM

## 2019-05-26 PROCEDURE — 84478 ASSAY OF TRIGLYCERIDES: CPT | Performed by: STUDENT IN AN ORGANIZED HEALTH CARE EDUCATION/TRAINING PROGRAM

## 2019-05-26 PROCEDURE — 25800030 ZZH RX IP 258 OP 636: Performed by: NURSE PRACTITIONER

## 2019-05-26 PROCEDURE — 25000132 ZZH RX MED GY IP 250 OP 250 PS 637: Performed by: STUDENT IN AN ORGANIZED HEALTH CARE EDUCATION/TRAINING PROGRAM

## 2019-05-26 PROCEDURE — 25000128 H RX IP 250 OP 636: Performed by: STUDENT IN AN ORGANIZED HEALTH CARE EDUCATION/TRAINING PROGRAM

## 2019-05-26 PROCEDURE — 36415 COLL VENOUS BLD VENIPUNCTURE: CPT | Performed by: STUDENT IN AN ORGANIZED HEALTH CARE EDUCATION/TRAINING PROGRAM

## 2019-05-26 PROCEDURE — 25000128 H RX IP 250 OP 636: Performed by: PSYCHIATRY & NEUROLOGY

## 2019-05-26 PROCEDURE — 25800030 ZZH RX IP 258 OP 636: Performed by: PSYCHIATRY & NEUROLOGY

## 2019-05-26 PROCEDURE — 99222 1ST HOSP IP/OBS MODERATE 55: CPT | Performed by: NURSE PRACTITIONER

## 2019-05-26 PROCEDURE — 12000001 ZZH R&B MED SURG/OB UMMC

## 2019-05-26 RX ORDER — SODIUM CHLORIDE, SODIUM LACTATE, POTASSIUM CHLORIDE, CALCIUM CHLORIDE 600; 310; 30; 20 MG/100ML; MG/100ML; MG/100ML; MG/100ML
INJECTION, SOLUTION INTRAVENOUS CONTINUOUS
Status: DISPENSED | OUTPATIENT
Start: 2019-05-26 | End: 2019-05-27

## 2019-05-26 RX ADMIN — SODIUM CHLORIDE, POTASSIUM CHLORIDE, SODIUM LACTATE AND CALCIUM CHLORIDE: 600; 310; 30; 20 INJECTION, SOLUTION INTRAVENOUS at 17:16

## 2019-05-26 RX ADMIN — CALCIUM 500 MG: 500 TABLET ORAL at 08:38

## 2019-05-26 RX ADMIN — Medication 1 MG: at 21:59

## 2019-05-26 RX ADMIN — Medication 3 ML: at 08:48

## 2019-05-26 RX ADMIN — ACETAMINOPHEN 975 MG: 325 TABLET, FILM COATED ORAL at 17:21

## 2019-05-26 RX ADMIN — SODIUM CHLORIDE 500 MG: 9 INJECTION, SOLUTION INTRAVENOUS at 05:51

## 2019-05-26 RX ADMIN — TRAMADOL HYDROCHLORIDE 50 MG: 50 TABLET, COATED ORAL at 15:57

## 2019-05-26 RX ADMIN — TRAMADOL HYDROCHLORIDE 50 MG: 50 TABLET, COATED ORAL at 08:38

## 2019-05-26 RX ADMIN — CHOLECALCIFEROL CAP 125 MCG (5000 UNIT) 5000 UNITS: 125 CAP at 08:38

## 2019-05-26 RX ADMIN — TRAMADOL HYDROCHLORIDE 50 MG: 50 TABLET, COATED ORAL at 21:59

## 2019-05-26 RX ADMIN — PROCHLORPERAZINE MALEATE 5 MG: 5 TABLET, FILM COATED ORAL at 08:38

## 2019-05-26 RX ADMIN — PANTOPRAZOLE SODIUM 40 MG: 40 TABLET, DELAYED RELEASE ORAL at 08:38

## 2019-05-26 RX ADMIN — ONDANSETRON 4 MG: 2 INJECTION INTRAMUSCULAR; INTRAVENOUS at 03:49

## 2019-05-26 ASSESSMENT — ACTIVITIES OF DAILY LIVING (ADL)
ADLS_ACUITY_SCORE: 9

## 2019-05-26 ASSESSMENT — PAIN DESCRIPTION - DESCRIPTORS
DESCRIPTORS: ACHING;DISCOMFORT
DESCRIPTORS: DISCOMFORT

## 2019-05-26 ASSESSMENT — MIFFLIN-ST. JEOR: SCORE: 1391.04

## 2019-05-26 NOTE — PLAN OF CARE
Pt. is A&OX4,VSS  up independently ,pt. c/o nausea and abdominal discomfort this morning ,tramadol and oral compazine given which was effective. patient had 3 loose stools,pt. walked hallway one time independently.LS clear,BS +,had adequate urinary output ,right side of the face red and the redness is outlined, will  Monitor for possible cellulitis. patient denies pain at the red site but does c/o itching. no benadryl needed at this time.Right eye blurred vision still present and pt. does can not  see colors.Lipase level 677 ,MD web paged regarding this result.Will continue to monitor.

## 2019-05-26 NOTE — CONSULTS
[facility], [place]  Consult Note - Hospitalist Service, Gold Night     Date of Admission:  5/22/2019  Consult Requested by: Dr. Ferris, Neurology team  Reason for Consult: Acute mild pancreatitis    Assessment & Plan   Kerrie Patel is a 49 year old female admitted on 5/22/2019. 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  Currently on neurology service being treated with high dose steroids and PLEX.  Internal medicine has been consulted for abdominal pain, nausea and elevated lipase concerning for acute mild pancreatitis.      #Acute mild pancreatitis   #Acute abdominal pain and nausea 2/2 above  Pt w/ prior episode of pancreatitis 1/2019 at [facility] felt to be 2/2 high dose steroids for treatment of optic neuritis. CT A/P at that time with acute pancreatitis, no pseudocyst. Currently admitted for recurrent R optic neuritis and being treated with high dose steroids.  Acute abd pain this AM, intolerance of solid foods, on episode of diarrhea and nausea.  Lipase 677.  Pt w/ prior cholecystectomy making gallstones unlikely, no etoh abuse.  Hepatic panel added on, WNL.  Triglycerides 1/2019 WNL.  Most likely etiology is high dose steroids, no other offending medications identified.    - Recommend CLD tonight, advance slowly as tolerated based on lipase and exam in AM.  Would recommend slow advancement to low fat diet   - Check triglycerides (ordered for you)  - CMP, CBC and lipase in AM (ordered for you)  - Recommending IVF hydration overnight: LR at 150mL/hour (ordered for you)  - Monitor abd exam closely, if worsening pain and/or development of fever would recommend CT A/P w/ IV contrast  "  - If ongoing bouts of pancreatitis, may consider involvement of GI team in care   - Tramadol PRN   - PRN antiemetics as you are doing     #Recurrent right optic neuritis   #Mild left sided weakness   #Anti-MOGopathy  Currently being treated with high dose steroids and PLEX.  Transfusion medicine and neurology following.  Care per their team.  Please see their notes for more details.     #Hypertension  No hx of HTN in chart, elevated to SBP 160s likely due to steroids.  Remains asymptomatic, expect improvement once steroids weaned off.    - Can offer PRN anti-HTN agents (IV Hydralazine) if needed for SBP >180s     #Leuckocytosis   WBC 17.4 (19.5), previously normal.  No infectious source identified and she remains afebrile.  Likely also steroid-induced with contribution of acute inflammation from pancreatitis.    - Trend CBC in AM as above   - If febrile, recc UA, blood cx x 2 and CT A/P with IV contrast to r/o necrotizing pancreatitis     #Hypofibrinogenemia   Fibrinogen 150 on AM CBC, no s/s of bleeding.  Currently on PLEX which is likely culprit.   - Recheck level w/ INR and CBC In AM per transfusion medicine reccs   - Per their note, plan for use of plasma as part of replacement fluid for next TPE    #Steroid induced hyperglycemia  Glucoses ranging 167-175.  No hx DM, lat Hgb A1C 5/7 (1/23/2019).  No current need for insulin.   - If glucoses conitnue to be elevated persistently, can order \"low\" sliding scale Novolog and check glucoses more frequently     The patient's care was discussed with the Attending Physician, Dr. Kulkarni, Bedside Nurse and Patient and neurology team via this note.      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     Per chart review, appears as though she was diagnosed with optic neuritis 1/2019 after presenting with pain and visual changes in her right eye.  She was managed in [place] with high dose IV steroids with hospital course complicated by steroid-induced pancreatitis (lipase ~1300 at the time--treated conservatively).  She then underwent PLEX 1/2019-2/2019.  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  Admitted 3/2019 for IV methylprednisolone and Rituximab infusions and discharged on a Prednisone taper that went into April.  Had been doing quite well from 4/25-5/15 at which time she was seen in neurophthalmology clinic for return of her right eye visual symptoms with right sided head pain eye pain and significantly decreased vision.  Returned to neurooptho clinic 5/22 as she demonstrated no improvement in her symptoms on oral steroids and was directly admitted to Neurology service for high dose steroids and PLEX.     This morning, she began developing increased R sided abdominal pain with some nausea but no vomiting.  SHe had note had a bowel movement since Thursday so assumed it was likely 2/2 her constipation.  She was able to have a BM this AM which was initially normal and then was noted to be loose.  Her abdominal pain persisted throughout the day therefore a lipase was checked and noted to be elevated.  She has eaten very little today, had some solid food and states \"it did not sit well\".  Ongoing nausea, no vomiting but does have abd pain.  Tolerating water.  No fevers, chills, chest pain, shortness of breath, dysuria, " melena, hematochezia, hematuria, abnormal bleeding or bruising. Ongoing headaches, tolerated PLEX today.     Review of Systems   The 10 point Review of Systems is negative other than noted in the HPI or here.     Past Medical History    I have reviewed this patient's medical history and updated it with pertinent information if needed.   Past Medical History:   Diagnosis Date     Diffuse cystic mastopathy of breast     No Comments Provided     Family history of diabetes mellitus     No Comments Provided     Gastro-esophageal reflux disease without esophagitis     No Comments Provided     Pain in thoracic spine     No Comments Provided       Past Surgical History   I have reviewed this patient's surgical history and updated it with pertinent information if needed.  Past Surgical History:   Procedure Laterality Date      SECTION      96, 99     CHOLECYSTECTOMY           OTHER SURGICAL HISTORY      2012,RNL522,PREMALIG/BENIGN SKIN LESION EXCISION,BREAST LESION     OTHER SURGICAL HISTORY      2017,04711.0,AR TLH W TUBES/OVAR 250 G OR LESS       Social History   I have reviewed this patient's social history and updated it with pertinent information if needed.  Social History     Tobacco Use     Smoking status: Never Smoker     Smokeless tobacco: Never Used   Substance Use Topics     Alcohol use: No     Alcohol/week: 0.0 oz     Comment: rare     Drug use: No     Comment: Drug use: No       Family History   I have reviewed this patient's family history and updated it with pertinent information if needed.   Family History   Problem Relation Age of Onset     Diabetes Father         Diabetes,Type 2, Diverticulitis age 47      Breast Cancer No family hx of         Cancer-breast     Glaucoma No family hx of      Macular Degeneration No family hx of        Medications   Current Facility-Administered Medications   Medication     acetaminophen (TYLENOL) tablet 975 mg     bisacodyl (DULCOLAX) Suppository 10 mg      calcium carbonate 500 mg (elemental) (OSCAL;OYSTER SHELL CALCIUM) tablet 500 mg     cholecalciferol (VITAMIN D3) 5000 units (125 mcg) capsule 5,000 Units     glucose gel 15-30 g    Or     dextrose 50 % injection 25-50 mL    Or     glucagon injection 1 mg     diphenhydrAMINE (BENADRYL) capsule 25 mg     heparin 100 UNIT/ML injection 3 mL     heparin 100 UNIT/ML injection 3 mL     heparin 100 UNIT/ML injection 5 mL     heparin 100 UNIT/ML injection 5 mL     lactated ringers infusion     lidocaine (LMX4) cream     lidocaine 1 % 0.1-1 mL     magnesium sulfate 4 g in 100 mL sterile water (premade)     melatonin tablet 1 mg     melatonin tablet 1 mg     naloxone (NARCAN) injection 0.1-0.4 mg     ondansetron (ZOFRAN) injection 4 mg    Or     ondansetron (ZOFRAN) tablet 4 mg     pantoprazole (PROTONIX) EC tablet 40 mg     polyethylene glycol (MIRALAX/GLYCOLAX) Packet 17 g     potassium chloride (KLOR-CON) Packet 20-40 mEq     potassium chloride 10 mEq in 100 mL intermittent infusion with 10 mg lidocaine     potassium chloride 10 mEq in 100 mL sterile water intermittent infusion (premix)     potassium chloride 20 mEq in 50 mL intermittent infusion     potassium chloride ER (K-DUR/KLOR-CON M) CR tablet 20-40 mEq     prochlorperazine (COMPAZINE) tablet 5 mg     senna-docusate (SENOKOT-S/PERICOLACE) 8.6-50 MG per tablet 1 tablet    Or     senna-docusate (SENOKOT-S/PERICOLACE) 8.6-50 MG per tablet 2 tablet     sodium chloride (PF) 0.9% PF flush 10 mL     sodium chloride (PF) 0.9% PF flush 10-20 mL     sulfamethoxazole-trimethoprim (BACTRIM DS/SEPTRA DS) 800-160 MG per tablet 1 tablet     traMADol (ULTRAM) tablet 50 mg       Allergies   Allergies   Allergen Reactions     Amoxicillin Nausea and Vomiting     Lactose      Other reaction(s): GI Bleeding       Physical Exam   Vital Signs: Temp: 96.9  F (36.1  C) Temp src: Oral BP: 138/71 Pulse: 60 Heart Rate: 59 Resp: 20 SpO2: 92 % O2 Device: None (Room air)    Weight: 181 lbs 8  oz    General Appearance: NAD siitting up in bed, interactive and pleasant.   Eyes: PERRLA, anicteric conjunctiva  HEENT: MMM, no lymphadenopathy. Dual lumen non-tunneled R internal jugular catheter in place, CDI w/o s/s of infection   Respiratory: Normal effort on RA, good air flow bilaterally.  No wheezes, rales or rhonchi.   Cardiovascular: RRR, S1S2.  No murmurs appreciated.   GI: Abdomen soft, non distended.  Moderate TTP mid-epigastric radiating to R upper/lower quadrants.  No rebound or guarding.   Lymph/Hematologic: No abnormal bruising/bleeding.   Skin: CDI, no open wounds, rashes, jaundice.   Musculoskeletal: No joint swelling or tenderness   Neurologic: A+O x 3, RUE/RLE 5/5, LLE 5/5, LUE 4+/5, sensation intact.  Gait not assessed.   Psychiatric: Mood stable     Data   I personally reviewed all pertinent labs and imaging from this hospitalization.     Recent Labs   Lab 05/26/19  0940 05/24/19  0552   LIPASE 677* 97     Most Recent 3 CBC's:  Recent Labs   Lab Test 05/25/19  0604 05/24/19  0552 05/23/19  0643   WBC 17.4* 19.5* 14.8*   HGB 14.8 14.3 14.4   MCV 93 96 97    161 184     Most Recent 3 BMP's:  Recent Labs   Lab Test 05/25/19  0604 05/24/19  0552 05/23/19  0643    140 139   POTASSIUM 4.2 4.3 4.0   CHLORIDE 106 106 107   CO2 26 27 26   BUN 28 24 21   CR 0.69 0.62 0.63   ANIONGAP 7 7 6   LISA 8.6 8.7 8.9   * 167* 161*     Most Recent 2 LFT's:  Recent Labs   Lab Test 05/26/19  0940 05/22/19  1357   AST 6 13   ALT 16 31   ALKPHOS 14* 59   BILITOTAL 0.7 0.2     Most Recent 3 INR's:  Recent Labs   Lab Test 05/23/19  1415 02/12/19  0106 01/18/19  1120   INR 1.10 0.98 1.00

## 2019-05-26 NOTE — PROVIDER NOTIFICATION
Patient states she is itchy & tingly from internal jugular insertion site to jaw.  Patient requested Benadryl.  Paged Neurology Crosscover at [phone].

## 2019-05-26 NOTE — PLAN OF CARE
[id number]: AVSS. Denied pain. Ate 100% of dinner but with fair appetite. C/o facial itchiness, PO benadryl x 1 with relief. MD aware. Continue with POC.

## 2019-05-26 NOTE — PLAN OF CARE
Alert and oriented X 4. AVSS. Denies headache. C/o nausea and abdominal discomfort related to constipation. Does not want bowel medications at this time. She will order prune juice with breakfast. Fluids and activity encouraged. Pt has walked in hallway twice this shift. Sleeping in between cares.     Pt reports having a 'good' soft bowel movement this morning. Following this she developed lower back and abdominal cramping. Declines pain interventions at this time. Will continue to monitor.

## 2019-05-27 LAB
ALBUMIN SERPL-MCNC: 3.5 G/DL (ref 3.4–5)
ALP SERPL-CCNC: 14 U/L (ref 40–150)
ALT SERPL W P-5'-P-CCNC: 17 U/L (ref 0–50)
ANION GAP SERPL CALCULATED.3IONS-SCNC: 5 MMOL/L (ref 3–14)
AST SERPL W P-5'-P-CCNC: 6 U/L (ref 0–45)
BILIRUB SERPL-MCNC: 0.6 MG/DL (ref 0.2–1.3)
BLD PROD DISPENSED VOL BPU: 1250 ML
BLD PROD TYP BPU: NORMAL
BLD UNIT ID BPU: 0
BLOOD PRODUCT CODE: NORMAL
BPU ID: NORMAL
BUN SERPL-MCNC: 27 MG/DL (ref 7–30)
CALCIUM SERPL-MCNC: 8.1 MG/DL (ref 8.5–10.1)
CHLORIDE SERPL-SCNC: 105 MMOL/L (ref 94–109)
CO2 SERPL-SCNC: 28 MMOL/L (ref 20–32)
CREAT SERPL-MCNC: 0.68 MG/DL (ref 0.52–1.04)
ERYTHROCYTE [DISTWIDTH] IN BLOOD BY AUTOMATED COUNT: 13.6 % (ref 10–15)
FIBRINOGEN PPP-MCNC: 64 MG/DL (ref 200–420)
GFR SERPL CREATININE-BSD FRML MDRD: >90 ML/MIN/{1.73_M2}
GLUCOSE SERPL-MCNC: 148 MG/DL (ref 70–99)
HCT VFR BLD AUTO: 41.9 % (ref 35–47)
HGB BLD-MCNC: 13.1 G/DL (ref 11.7–15.7)
INR PPP: 1.67 (ref 0.86–1.14)
LIPASE SERPL-CCNC: 782 U/L (ref 73–393)
MCH RBC QN AUTO: 30.7 PG (ref 26.5–33)
MCHC RBC AUTO-ENTMCNC: 31.3 G/DL (ref 31.5–36.5)
MCV RBC AUTO: 98 FL (ref 78–100)
NUM BPU REQUESTED: 4
PLATELET # BLD AUTO: 120 10E9/L (ref 150–450)
POTASSIUM SERPL-SCNC: 4.4 MMOL/L (ref 3.4–5.3)
PROT SERPL-MCNC: 4.7 G/DL (ref 6.8–8.8)
RBC # BLD AUTO: 4.27 10E12/L (ref 3.8–5.2)
SODIUM SERPL-SCNC: 138 MMOL/L (ref 133–144)
TRANSFUSION STATUS PATIENT QL: NORMAL
WBC # BLD AUTO: 13.8 10E9/L (ref 4–11)

## 2019-05-27 PROCEDURE — 99232 SBSQ HOSP IP/OBS MODERATE 35: CPT | Performed by: PEDIATRICS

## 2019-05-27 PROCEDURE — 25000125 ZZHC RX 250: Performed by: PATHOLOGY

## 2019-05-27 PROCEDURE — 25000128 H RX IP 250 OP 636: Performed by: PATHOLOGY

## 2019-05-27 PROCEDURE — P9041 ALBUMIN (HUMAN),5%, 50ML: HCPCS | Performed by: PATHOLOGY

## 2019-05-27 PROCEDURE — P9059 PLASMA, FRZ BETWEEN 8-24HOUR: HCPCS | Performed by: PSYCHIATRY & NEUROLOGY

## 2019-05-27 PROCEDURE — 25000131 ZZH RX MED GY IP 250 OP 636 PS 637: Performed by: STUDENT IN AN ORGANIZED HEALTH CARE EDUCATION/TRAINING PROGRAM

## 2019-05-27 PROCEDURE — 36514 APHERESIS PLASMA: CPT

## 2019-05-27 PROCEDURE — 12000001 ZZH R&B MED SURG/OB UMMC

## 2019-05-27 PROCEDURE — 40000344 ZZHCL STATISTIC THAWING COMPONENT: Performed by: PSYCHIATRY & NEUROLOGY

## 2019-05-27 PROCEDURE — 25000132 ZZH RX MED GY IP 250 OP 250 PS 637: Performed by: PSYCHIATRY & NEUROLOGY

## 2019-05-27 PROCEDURE — 83690 ASSAY OF LIPASE: CPT | Performed by: NURSE PRACTITIONER

## 2019-05-27 PROCEDURE — 80053 COMPREHEN METABOLIC PANEL: CPT | Performed by: NURSE PRACTITIONER

## 2019-05-27 PROCEDURE — 25000132 ZZH RX MED GY IP 250 OP 250 PS 637: Performed by: STUDENT IN AN ORGANIZED HEALTH CARE EDUCATION/TRAINING PROGRAM

## 2019-05-27 PROCEDURE — 85610 PROTHROMBIN TIME: CPT | Performed by: PATHOLOGY

## 2019-05-27 PROCEDURE — 85027 COMPLETE CBC AUTOMATED: CPT | Performed by: NURSE PRACTITIONER

## 2019-05-27 PROCEDURE — 85384 FIBRINOGEN ACTIVITY: CPT | Performed by: PATHOLOGY

## 2019-05-27 PROCEDURE — 36415 COLL VENOUS BLD VENIPUNCTURE: CPT | Performed by: NURSE PRACTITIONER

## 2019-05-27 PROCEDURE — 25800030 ZZH RX IP 258 OP 636: Performed by: NURSE PRACTITIONER

## 2019-05-27 RX ORDER — CALCIUM GLUCONATE 100 MG/ML
AMPUL (ML) INTRAVENOUS
Status: COMPLETED | OUTPATIENT
Start: 2019-05-27 | End: 2019-05-27

## 2019-05-27 RX ORDER — LANOLIN ALCOHOL/MO/W.PET/CERES
3 CREAM (GRAM) TOPICAL AT BEDTIME
Status: DISCONTINUED | OUTPATIENT
Start: 2019-05-27 | End: 2019-05-31 | Stop reason: HOSPADM

## 2019-05-27 RX ORDER — ALBUMIN HUMAN 25 %
1000 INTRAVENOUS SOLUTION INTRAVENOUS
Status: COMPLETED | OUTPATIENT
Start: 2019-05-27 | End: 2019-05-27

## 2019-05-27 RX ORDER — HEPARIN SODIUM (PORCINE) LOCK FLUSH IV SOLN 100 UNIT/ML 100 UNIT/ML
3 SOLUTION INTRAVENOUS ONCE
Status: COMPLETED | OUTPATIENT
Start: 2019-05-27 | End: 2019-05-27

## 2019-05-27 RX ORDER — SULFAMETHOXAZOLE/TRIMETHOPRIM 800-160 MG
1 TABLET ORAL
Status: DISCONTINUED | OUTPATIENT
Start: 2019-05-29 | End: 2019-05-31 | Stop reason: HOSPADM

## 2019-05-27 RX ORDER — PANTOPRAZOLE SODIUM 40 MG/1
40 TABLET, DELAYED RELEASE ORAL DAILY
Status: DISCONTINUED | OUTPATIENT
Start: 2019-05-28 | End: 2019-05-31 | Stop reason: HOSPADM

## 2019-05-27 RX ORDER — PREDNISONE 20 MG/1
20 TABLET ORAL DAILY
Status: DISCONTINUED | OUTPATIENT
Start: 2019-05-27 | End: 2019-05-31 | Stop reason: HOSPADM

## 2019-05-27 RX ADMIN — Medication 3 ML: at 10:23

## 2019-05-27 RX ADMIN — SULFAMETHOXAZOLE AND TRIMETHOPRIM 1 TABLET: 800; 160 TABLET ORAL at 09:22

## 2019-05-27 RX ADMIN — TRAMADOL HYDROCHLORIDE 50 MG: 50 TABLET, COATED ORAL at 10:46

## 2019-05-27 RX ADMIN — SODIUM CHLORIDE, POTASSIUM CHLORIDE, SODIUM LACTATE AND CALCIUM CHLORIDE: 600; 310; 30; 20 INJECTION, SOLUTION INTRAVENOUS at 00:10

## 2019-05-27 RX ADMIN — ACETAMINOPHEN 975 MG: 325 TABLET, FILM COATED ORAL at 01:04

## 2019-05-27 RX ADMIN — PREDNISONE 20 MG: 20 TABLET ORAL at 12:00

## 2019-05-27 RX ADMIN — MELATONIN TAB 3 MG 3 MG: 3 TAB at 22:11

## 2019-05-27 RX ADMIN — TRAMADOL HYDROCHLORIDE 50 MG: 50 TABLET, COATED ORAL at 04:02

## 2019-05-27 RX ADMIN — PANTOPRAZOLE SODIUM 40 MG: 40 TABLET, DELAYED RELEASE ORAL at 07:43

## 2019-05-27 RX ADMIN — ALBUMIN HUMAN 1000 ML: 0.05 INJECTION, SOLUTION INTRAVENOUS at 08:45

## 2019-05-27 RX ADMIN — CALCIUM GLUCONATE 2 G: 98 INJECTION, SOLUTION INTRAVENOUS at 08:55

## 2019-05-27 RX ADMIN — CHOLECALCIFEROL CAP 125 MCG (5000 UNIT) 5000 UNITS: 125 CAP at 07:43

## 2019-05-27 RX ADMIN — ANTICOAGULANT CITRATE DEXTROSE SOLUTION FORMULA A 546 ML: 12.25; 11; 3.65 SOLUTION INTRAVENOUS at 08:45

## 2019-05-27 RX ADMIN — CALCIUM 500 MG: 500 TABLET ORAL at 07:43

## 2019-05-27 ASSESSMENT — ACTIVITIES OF DAILY LIVING (ADL)
ADLS_ACUITY_SCORE: 9

## 2019-05-27 ASSESSMENT — PAIN DESCRIPTION - DESCRIPTORS
DESCRIPTORS: CRAMPING
DESCRIPTORS: SPASM
DESCRIPTORS: SPASM

## 2019-05-27 ASSESSMENT — MIFFLIN-ST. JEOR: SCORE: 1405.1

## 2019-05-27 NOTE — PLAN OF CARE
Alert and oriented X 4. AVSS. Continues to have blurred vision and loss of color vision in right eye. Reports transient right eye discomfort with light when she first wakes up in the morning. C/o abdominal and lower back cramping. Using acetaminophen and tramadol with relief. IV hydration overnight. Sleeping in between cares.

## 2019-05-27 NOTE — PROGRESS NOTES
[id number]    Neuro: A&Ox4.   Cardiac: Afebrile, VSS.   Respiratory: RA   GI/: Voiding spontaneously. No BM this shift.   Diet/appetite: Tolerating clear liquid diet. Denies nausea   Activity: Up ad rudy   Pain: . C/o abd pain- tylenol and tramadol PRN   Skin: No new deficits noted. L facial marking without redness throughout past 4 hours   Lines: PIV infusing LR @ 150cc/hr       Pt has been resting comfortably throughout shift, will continue to monitor and follow plan of care.

## 2019-05-27 NOTE — PROVIDER NOTIFICATION
Neuro MD notified of increased R cheek rash, past previously marked borders. Pt asymptomatic at this time, declines benadryl. Per MD, continue to monitor at this time, benadryl if becomes symptomatic.

## 2019-05-27 NOTE — PROGRESS NOTES
"[facility], [place]   Neurology Daily Note  Kerrie Patel  [id number]  05/26/2019    Subjective: Ms. Ptael had abdominal pain yesterday evening.  She additionally had itching of her right face and erythema was noted.  She received Benadryl with some relief.  She reports possibly some improvement in her vision.    Objective   /73 (BP Location: Left arm)   Pulse 58   Temp 97.5  F (36.4  C) (Oral)   Resp 18   Ht 1.55 m (5' 1.02\")   Wt 82.3 kg (181 lb 8 oz)   SpO2 93%   BMI 34.27 kg/m    General: Sitting in bed, appears comfortable.   HEENT: Normocephalic atraumatic   Cardiac: RRR  Chest: No respiratory distress, clear to auscultation.   Abdomen: No abdominal discomfort   Extremities: No bilateral lower extremity edema   Skin:  No lesions.   Psych:  Mood and affect stable.     Neuro:  Mental status: Alert, attentive, oriented. Follows commands. Speech fluent and comprehension intact.   Cranial nerves:  Eyes conjugate, visual fields intact, right afferent pupillary defect, patient still unable to distinguish red.  Motor: Tone normal. No atrophy. Left pronator drift otherwise strength testing 5 out of 5 throughout.   Reflexes: Toes flexor   Sensory: Intact to light touch throughout   Coordination: FNF and HTS without dysmetria.   Gait: Not tested     Investigations    CMP   Recent Labs   Lab 05/26/19  0940 05/25/19  0604 05/24/19  0552 05/23/19  0643 05/22/19  1357   NA  --  140 140 139 140   POTASSIUM  --  4.2 4.3 4.0 4.1   CHLORIDE  --  106 106 107 107   CO2  --  26 27 26 28   ANIONGAP  --  7 7 6 6   GLC  --  175* 167* 161* 128*   BUN  --  28 24 21 15   CR  --  0.69 0.62 0.63 0.63   GFRESTIMATED  --  >90 >90 >90 >90   GFRESTBLACK  --  >90 >90 >90 >90   LISA  --  8.6 8.7 8.9 9.1   MAG  --   --   --   --  2.6*   PROTTOTAL 5.4*  --   --   --  6.5*   ALBUMIN 4.1  --   --   --  3.5   BILITOTAL 0.7  --   --   --  0.2   ALKPHOS 14*  --   --   --  59   AST 6  --   --   --  13   ALT 16  --   " --   --  31      CBC   Recent Labs   Lab 05/25/19  0604 05/24/19  0552 05/23/19  0643 05/22/19  1357   WBC 17.4* 19.5* 14.8* 12.8*   RBC 4.85 4.64 4.65 4.92   HGB 14.8 14.3 14.4 14.9   HCT 45.3 44.4 44.9 46.7   MCV 93 96 97 95   MCH 30.5 30.8 31.0 30.3   MCHC 32.7 32.2 32.1 31.9   RDW 13.4 13.4 13.5 13.5    161 184 208     Assessment and Plan   49 year old female 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  Exam is notable for a right afferent pupillary defect, reduced right visual acuity, and mild weakness left upper and lower extremity.  She was admitted as a direct admit for Plex 5 rounds and high-dose steroid treatment.  Given her prior complications of pancreatitis and hallucinations with Solu-Medrol 1000 mg daily we will break her dose up into 500 mg twice a day.       # MOG-opathy   #Recurrent right optic neuritis  #Mild left-sided weakness  - Discontinue solu-medrol  - GI prophylaxis with pantoprazole while on steroids  - Continue PCP prophylaxis with Bactrim MWF  - Plasma exchange 2/5 with tx today   - Transfusion medicine consult   - Tylenol for headaches  - Tramadol 50 mg every 6 hours PRN for severe headache  - Zofran and Compazine for nausea  - Continue vitamin D 5000 units daily  - Continue calcium carbonate 500 mg daily  - S/p MRI C and T spine with and without contrast, stable and no new lesions     #Pancreatitis  Possibly 2/2 the high dose steroids. Lipase significantly increase from 2 days ago with abdominal pain.  -Medicine consult, appreciate recs  -Clear liquid diet  -Triglycerides  -CMP, CBC and lipase in AM  -LR at 150 ml/hour  -CT A/P w/IV contrast for abdominal pain     # Leukocytosis   Suspected secondary to high dose steroids. Will monitor for signs and symptoms of infection.   -If fever, will obtain UA, blood cultures, and CT A/P w/IV  contrast      #Hyperglycemia secondary to steroids  Blood glucose in 160-170 range with steroids.  No history of diabetes.  No sliding scale at this time  -Continue to monitor    #Hypertension  Blood pressures have been elevated while on steroids to SBP 160s.  She has never been on any antihypertensive medications.  We will continue to monitor for now.     #Insomnia   Trouble with sleep while receiving steroids previously.    - Melatonin 1 mg nightly    #Hx of pancreatitis   Thought to be secondary to high dose steroids previously. No current abdominal pain   Lipase 97 baseline.   - Monitor for abdominal pain      FEN: Regular diet   PPx: SCDs, ambulation   Code: Full      Patient was seen and discussed with Dr. Barrington Sharpe MD on 5/26/2019 at 10:03 PM  Neurology PGY3  5730

## 2019-05-27 NOTE — PROGRESS NOTES
"[facility], [place]   Neurology Daily Note  Kerrie Patel  [id number]  05/27/2019    Subjective: Ms. Patel reports that her abdominal pain has improved since yesterday afternoon but that she has been having some abdominal spasms. Reports that the nausea also improved sometime around yesterday evening. She has had no further episodes of diarrhea. Right eye vision is still blurry and red colors still appear grey.     Objective   /77   Pulse 54   Temp 96  F (35.6  C) (Oral)   Resp 16   Ht 1.55 m (5' 1.02\")   Wt 83.7 kg (184 lb 9.6 oz)   SpO2 93%   BMI 34.85 kg/m    General: Sitting in bed, appears comfortable.   HEENT: Normocephalic atraumatic   Cardiac: RRR, S1/S2, no m/r/g  Chest: No respiratory distress, clear to auscultation.   Abdomen: No abdominal discomfort or distention, non-tender to palpation  Extremities: No bilateral lower extremity edema   Skin:  No lesions.   Psych:  Mood and affect stable.     Neuro:  Mental status: Alert, attentive, oriented. Follows commands. Speech fluent and comprehension intact.   Cranial nerves:  Eyes conjugate, right afferent pupillary defect, patient still unable to distinguish red, no facial asymmetry, facial sensation intact, tongue midline  Motor: Tone normal. No atrophy. Left pronator drift otherwise strength testing 5 out of 5 throughout.   Reflexes: 2+ reflexes in biceps, brachioradialis and knees. Toes downgoing bilaterally.  Sensory: Intact to light touch throughout   Coordination: FNF without dysmetria.   Gait: Not tested     Investigations    CMP   Recent Labs   Lab 05/27/19  0453 05/26/19  0940 05/25/19  0604 05/24/19  0552 05/23/19  0643 05/22/19  1357     --  140 140 139 140   POTASSIUM 4.4  --  4.2 4.3 4.0 4.1   CHLORIDE 105  --  106 106 107 107   CO2 28  --  26 27 26 28   ANIONGAP 5  --  7 7 6 6   *  --  175* 167* 161* 128*   BUN 27  --  28 24 21 15   CR 0.68  --  0.69 0.62 0.63 0.63   GFRESTIMATED >90  --  >90 " >90 >90 >90   GFRESTBLACK >90  --  >90 >90 >90 >90   LISA 8.1*  --  8.6 8.7 8.9 9.1   MAG  --   --   --   --   --  2.6*   PROTTOTAL 4.7* 5.4*  --   --   --  6.5*   ALBUMIN 3.5 4.1  --   --   --  3.5   BILITOTAL 0.6 0.7  --   --   --  0.2   ALKPHOS 14* 14*  --   --   --  59   AST 6 6  --   --   --  13   ALT 17 16  --   --   --  31      CBC   Recent Labs   Lab 05/27/19  0453 05/25/19  0604 05/24/19  0552 05/23/19  0643   WBC 13.8* 17.4* 19.5* 14.8*   RBC 4.27 4.85 4.64 4.65   HGB 13.1 14.8 14.3 14.4   HCT 41.9 45.3 44.4 44.9   MCV 98 93 96 97   MCH 30.7 30.5 30.8 31.0   MCHC 31.3* 32.7 32.2 32.1   RDW 13.6 13.4 13.4 13.5   * 155 161 184     Assessment and Plan   Ms Kerrie Patel is a 49 year old woman with a 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  Exam is notable for a right afferent pupillary defect, reduced right visual acuity, and a left pronator drift in the LUE.  She was admitted as a direct admit for five rounds of PLEX and high dose steroids. She has had prior complications of pancreatitis and hallucinations with Solu-Medrol 1000 mg daily and developed panreatitis again during this hospitalization. Therefore Solu-Medrol was discontinued yesterday.      #MOG-opathy   #Recurrent right optic neuritis  #Mild left-sided weakness  - Discontinued solu-medrol on 5/26 due to pancreatitis, she has had a previous history of pancreatitis following IV pulsed steroids.   - Given that she has been on long-term oral steroids prior to admission will restart prednisone at 20mg daily to prevent adrenal insufficiency   - GI prophylaxis with pantoprazole while on steroids  - Continue PCP prophylaxis with Bactrim MWF  - Plasma exchange 3/5 today, appreciate transfusion medicine assistance  - Tylenol for headaches  - Tramadol 50 mg every 6 hours PRN for severe headache  - Zofran and  Compazine for nausea  - Continue vitamin D 5000 units daily  - Continue calcium carbonate 500 mg daily  - S/p MRI C and T spine with and without contrast, stable and no new lesions     #Pancreatitis  Likely 2/2 the high dose steroids. Lipase significantly increased with abdominal pain, improved today.   - Medicine consult, appreciate recs  - Clear liquid diet  - Monitor CBC, BMP and Lipase  - LR at 150 ml/hour  - CT A/P w/IV contrast if worsening abdominal pain     #Leukocytosis   Suspected secondary to high dose steroids, down-trended today after stopping steroids. Will monitor for signs and symptoms of infection.   -If fever, will obtain UA, blood cultures, and CT A/P w/IV contrast    #Hyperglycemia secondary to steroids  Blood glucose in 160-170 range with steroids.  No history of diabetes. Steroids discontinued yesterday, so except improvement in glucose levels.     #Hypertension  Blood pressures have been elevated while on steroids to SBP 160s.  She has never been on any antihypertensive medications. Improved today after discontinuing steroids yesterday.      #Insomnia    - Melatonin 3 mg nightly     FEN: Regular diet   PPx: SCDs, ambulation   Code: Full      Patient was seen and discussed with Dr. Lord.    Christi Domingo  Neurology Resident, PGY2  Pager: [phone]    Attending physician: I saw and evaluated the patient with the resident team and I agree with the findings and plan of care as per Dr. Domingo above.    The patient is a 49 year old woman with MOG-IgG associated CNS demyelinating disease presenting with recurrent right optic neuritis, unfortunately now complicated by pancreatitis presumably secondary to pulse methylprednisolone. Had been tolerating prednisone 5 mg daily prior to admission.    She is receiving the third of a planned five plasma exchanges today. Will place her back on prednisone 20 mg daily for now given relapse; long term immune suppression currently with rituximab,  initial treatment course completed in April. It is obviously disappointing that she had this relapse despite rituximab therapy but may be too early for full biologic impact to have been achieved. If she continues to experience relapses with steroid taper despite rituximab treatment, may need to entertain change to alternative agent (e.g., mycophenolate mofetil) or maintenance steroids--I have encountered at least two MOG+ patients who require baseline steroids to maintain adequate disease control.    Remainder as above.    Robbin Mazariegos MD   of Neurology

## 2019-05-27 NOTE — PROCEDURES
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 History of optic neuropathy/neuritis and idiopathic orbital inflammatory syndrome. Received TPE x 5 at an [facility] in [place] on February. Was on prednisone taper when she noticed right eye pain about 1 week ago. Couple days later, she noticed decreased vision of the right eye. Her prednisone was increased back up to 20mg but symptoms were not improving. Recent MRI shows subtle enhancement of the right optic nerve compatible with a history of optic neuritis. She is also status post rituximab infusions (March, April). Most recent MOG titer 1:20.     Today she had 3/5 TPE. Her fibrinogen level had been trending down, so instead of 5% albumin only, we used a combination of donor plasma (~60%) and 5% albumin (~40%) as the replacement fluid. She tolerated the procedure well. No complains voiced. Vision symptoms continue improving. Dr. Mazariegos and neurology resident on service have been text paged for ordering daily fibrinogen and INR.         - Next 4/5 TPE Wednesday. Due to her continued poor recovery of coagulation factors, we plan to use all donor plasma as the replacement fluid. Neurology team - Please check daily fibrinogen and INR.    - CVC vascular access  - ACD-A for anticoagulation. Will give calcium gluconate in the return to offset effects and monitor iCa.  - Do not start ACE inhibitors throughout the duration of the TPE series as these have been associated with reactions during apheresis.    - Notify the Transfusion Medicine physician of any upcoming procedures, surgeries, or biopsies as TPE  with albumin replacement will affect coagulation factor levels.  - Consider the impact of TPE on laboratory studies and medication levels.  IVIG and other immune therapies such as rituximab should NOT be given right prior to TPE but rather after due to removal.            Chief Complaint:   TPE consult          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 Received TPE x 5 at an [facility] in [place] on February. Was on prednisone taper when she noticed right eye pain about 1 week ago. Couple days later, she noticed decreased vision of the right eye. Her prednisone was increased back up to 20mg but symptoms were not improving. Recent MRI shows subtle enhancement of the right optic nerve compatible with a history of optic neuritis. She is also status post rituximab infusions (March, April). Most recent MOG titer 1:20.                Past Medical History:     Past Medical History:   Diagnosis Date     Diffuse cystic mastopathy of breast     No Comments Provided     Family history of diabetes mellitus     No Comments Provided     Gastro-esophageal reflux disease without esophagitis     No Comments Provided     Pain in thoracic spine     No Comments Provided             Past Surgical History:     Past Surgical History:   Procedure Laterality Date      SECTION      96, 99     CHOLECYSTECTOMY      2003     OTHER SURGICAL HISTORY      2012,OSR918,PREMALIG/BENIGN SKIN LESION EXCISION,BREAST LESION     OTHER SURGICAL HISTORY      2017,47511.0,CA TLH W TUBES/OVAR 250 G OR LESS              Social History:     Social History     Tobacco Use     Smoking status: Never Smoker     Smokeless tobacco: Never Used   Substance Use Topics     Alcohol use: No     Alcohol/week: 0.0 oz     Comment: rare             Family History:     Family History   Problem Relation  Age of Onset     Diabetes Father         Diabetes,Type 2, Diverticulitis age 47      Breast Cancer No family hx of         Cancer-breast     Glaucoma No family hx of      Macular Degeneration No family hx of              Immunizations:     Immunization History   Administered Date(s) Administered     Influenza (H1N1) 11/24/2009     Influenza Vaccine IM 3yrs+ 4 Valent IIV4 10/23/2014     TDAP Vaccine (Boostrix) 06/13/2017             Allergies:     Allergies   Allergen Reactions     Amoxicillin Nausea and Vomiting     Lactose      Other reaction(s): GI Bleeding             Medications:     Current Facility-Administered Medications   Medication     acetaminophen (TYLENOL) tablet 975 mg     bisacodyl (DULCOLAX) Suppository 10 mg     calcium carbonate 500 mg (elemental) (OSCAL;OYSTER SHELL CALCIUM) tablet 500 mg     cholecalciferol (VITAMIN D3) 5000 units (125 mcg) capsule 5,000 Units     glucose gel 15-30 g    Or     dextrose 50 % injection 25-50 mL    Or     glucagon injection 1 mg     diphenhydrAMINE (BENADRYL) capsule 25 mg     heparin 100 UNIT/ML injection 3 mL     heparin 100 UNIT/ML injection 3 mL     heparin 100 UNIT/ML injection 5 mL     heparin 100 UNIT/ML injection 5 mL     lidocaine (LMX4) cream     lidocaine 1 % 0.1-1 mL     magnesium sulfate 4 g in 100 mL sterile water (premade)     melatonin tablet 1 mg     melatonin tablet 3 mg     naloxone (NARCAN) injection 0.1-0.4 mg     ondansetron (ZOFRAN) injection 4 mg    Or     ondansetron (ZOFRAN) tablet 4 mg     [START ON 5/28/2019] pantoprazole (PROTONIX) EC tablet 40 mg     polyethylene glycol (MIRALAX/GLYCOLAX) Packet 17 g     potassium chloride (KLOR-CON) Packet 20-40 mEq     potassium chloride 10 mEq in 100 mL intermittent infusion with 10 mg lidocaine     potassium chloride 10 mEq in 100 mL sterile water intermittent infusion (premix)     potassium chloride 20 mEq in 50 mL intermittent infusion     potassium chloride ER (K-DUR/KLOR-CON M) CR tablet 20-40  "mEq     predniSONE (DELTASONE) tablet 20 mg     prochlorperazine (COMPAZINE) tablet 5 mg     senna-docusate (SENOKOT-S/PERICOLACE) 8.6-50 MG per tablet 1 tablet    Or     senna-docusate (SENOKOT-S/PERICOLACE) 8.6-50 MG per tablet 2 tablet     sodium chloride (PF) 0.9% PF flush 10 mL     sodium chloride (PF) 0.9% PF flush 10-20 mL     [START ON 5/29/2019] sulfamethoxazole-trimethoprim (BACTRIM DS/SEPTRA DS) 800-160 MG per tablet 1 tablet     traMADol (ULTRAM) tablet 50 mg             Review of Systems:   CONSTITUTIONAL: NEGATIVE for fever, chills, change in weight  ENT/MOUTH: NEGATIVE for ear, mouth and throat problems  RESP: NEGATIVE for significant cough or SOB  CV: NEGATIVE for chest pain, palpitations or peripheral edema           Vital Signs:   /78 (BP Location: Left arm)   Pulse 53   Temp 96.3  F (35.7  C) (Oral)   Resp 16   Ht 1.55 m (5' 1.02\")   Wt 83.7 kg (184 lb 9.6 oz)   SpO2 95%   BMI 34.85 kg/m                Data:     ROUTINE LABS (Last four results)  CMP  Recent Labs   Lab 05/27/19  0453 05/26/19  0940 05/25/19  0604 05/24/19  0552 05/23/19  0643 05/22/19  1357     --  140 140 139 140   POTASSIUM 4.4  --  4.2 4.3 4.0 4.1   CHLORIDE 105  --  106 106 107 107   CO2 28  --  26 27 26 28   ANIONGAP 5  --  7 7 6 6   *  --  175* 167* 161* 128*   BUN 27  --  28 24 21 15   CR 0.68  --  0.69 0.62 0.63 0.63   GFRESTIMATED >90  --  >90 >90 >90 >90   GFRESTBLACK >90  --  >90 >90 >90 >90   LISA 8.1*  --  8.6 8.7 8.9 9.1   MAG  --   --   --   --   --  2.6*   PROTTOTAL 4.7* 5.4*  --   --   --  6.5*   ALBUMIN 3.5 4.1  --   --   --  3.5   BILITOTAL 0.6 0.7  --   --   --  0.2   ALKPHOS 14* 14*  --   --   --  59   AST 6 6  --   --   --  13   ALT 17 16  --   --   --  31     CBC  Recent Labs   Lab 05/27/19  0453 05/25/19  0604 05/24/19  0552 05/23/19  0643   WBC 13.8* 17.4* 19.5* 14.8*   RBC 4.27 4.85 4.64 4.65   HGB 13.1 14.8 14.3 14.4   HCT 41.9 45.3 44.4 44.9   MCV 98 93 96 97   MCH 30.7 30.5 30.8 " 31.0   MCHC 31.3* 32.7 32.2 32.1   RDW 13.6 13.4 13.4 13.5   * 155 161 184     INR  Recent Labs   Lab 05/27/19  0842 05/23/19  1415   INR 1.67* 1.10             Procedure Summary:   A single plasma volume TPE was performed with donor albumin and 5% albumin replacement on a Spectra Optia cell separator.  The tunneled catheter was used for access.  ACD-A was used for anticoagulation.  To offset the effects of the citrate, calcium gluconate was given in the return line.  The patient's vital signs were stable throughout the TPE.  The patient tolerated the procedure well.     Sony Whyte MD  Fellow, Transfusion Medicine  Pager: [phone]

## 2019-05-27 NOTE — PROGRESS NOTES
[facility], [place]    Medicine Progress Note - Hospitalist Consultation Service, Gold        Date of Admission:  5/22/2019  Assessment & Plan   Kerrie Patel is a 49 year old female admitted on 5/22/2019. 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  Currently on neurology service being treated with high dose steroids and PLEX.  Internal medicine has been consulted for abdominal pain, nausea and elevated lipase concerning for acute mild pancreatitis.       #Acute mild pancreatitis   #Acute abdominal pain and nausea 2/2 above  Pt w/ prior episode of pancreatitis 1/2019 at [facility] felt to be 2/2 high dose steroids for treatment of optic neuritis. CT A/P at that time with acute pancreatitis, no pseudocyst. Currently admitted for recurrent R optic neuritis and being treated with high dose steroids.  Acute abd pain this AM, intolerance of solid foods, on episode of diarrhea and nausea.  Lipase 677.  Pt w/ prior cholecystectomy making gallstones unlikely, no etoh abuse.  Hepatic panel added on, WNL.  Triglycerides 1/2019 WNL.  Most likely etiology is high dose steroids, no other offending medications identified.    - ADAT given improvement in symptoms  - If clinical course continues to be improving no need for further labs.  - If resumption of pain can consider repeating labs and abdominal imaging RUQ US/CT Abd/pelv  - Tramadol PRN   - PRN antiemetics as you are doing      #Recurrent right optic neuritis   #Mild left sided weakness   #Anti-MOGopathy  Currently being treated with high dose steroids and PLEX.  Transfusion medicine and neurology following.  Care per their team.  Please see their  "notes for more details.      #Hypertension  No hx of HTN in chart, elevated to SBP 160s likely due to steroids.  Remains asymptomatic, expect improvement once steroids weaned off.    - Can offer PRN anti-HTN agents (IV Hydralazine) if needed for SBP >180s      #Leuckocytosis, multifactorial, Improving      #Hypofibrinogenemia   Fibrinogen 150 on AM CBC, no s/s of bleeding.  Currently on PLEX which is likely culprit.   - Recheck level w/ INR and CBC In AM per transfusion medicine reccs   - Per their note, plan for use of plasma as part of replacement fluid for next TPE     #Steroid induced hyperglycemia  Glucoses ranging 167-175.  No hx DM, lat Hgb A1C 5/7 (1/23/2019).  No current need for insulin.   - If glucoses conitnue to be elevated persistently, can order \"low\" sliding scale Novolog and check glucoses more frequently     The patient's care was discussed with the Patient and Primary team.    Corby Lopes MD  Hospitalist Service, [address], [place]  Pager: [phone]  Please see sticky note for cross cover information  ______________________________________________________________________    Interval History   Patient with improvement in pain overnight. Now with minimal epigastric pain that is intermittent and occasional. She no longer has any nausea and feels as though she could have a little more PO. No fevers or chills. No chest pain or difficulty breathing. Has edema that has been improving.     Data reviewed today: I reviewed all medications, new labs and imaging results over the last 24 hours. I personally reviewed no images or EKG's today.    Physical Exam   Vital Signs: Temp: 96.3  F (35.7  C) Temp src: Oral BP: 158/78 Pulse: 53 Heart Rate: 72 Resp: 16 SpO2: 95 % O2 Device: None (Room air)    Weight: 184 lbs 9.6 oz    General: Patient lying in bed, NAD  Resp: CTAB  CV: RRR, no m/r/g, good radial pulses  Abd: Large abdomen, mild tenderness to palpation of abdomen  Ext: " ~1-2+ pitting edema bilaterally  Neuro: alert and oriented    Data   Recent Labs   Lab 05/27/19  0842 05/27/19  0453 05/26/19  0940 05/25/19  0604 05/24/19  0552  05/23/19  1415   WBC  --  13.8*  --  17.4* 19.5*  --   --    HGB  --  13.1  --  14.8 14.3  --   --    MCV  --  98  --  93 96  --   --    PLT  --  120*  --  155 161  --   --    INR 1.67*  --   --   --   --   --  1.10   NA  --  138  --  140 140  --   --    POTASSIUM  --  4.4  --  4.2 4.3  --   --    CHLORIDE  --  105  --  106 106  --   --    CO2  --  28 --  26 27  --   --    BUN  --  27 --  28 24  --   --    CR  --  0.68  --  0.69 0.62  --   --    ANIONGAP  --  5  --  7 7  --   --    LISA  --  8.1*  --  8.6 8.7  --   --    GLC  --  148*  --  175* 167*  --   --    ALBUMIN  --  3.5 4.1  --   --   --   --    PROTTOTAL  --  4.7* 5.4*  --   --   --   --    BILITOTAL  --  0.6 0.7  --   --   --   --    ALKPHOS  --  14* 14*  --   --   --   --    ALT  --  17 16  --   --   --   --    AST  --  6 6  --   --   --   --    LIPASE  --  782* 677*  --  97   < >  --     < > = values in this interval not displayed.     No results found for this or any previous visit (from the past 24 hour(s)).  Medications       calcium carbonate 500 mg (elemental)  500 mg Oral Daily     cholecalciferol  5,000 Units Oral Daily     heparin  3 mL Intracatheter Q24H     heparin  5 mL Intracatheter Q24H     melatonin  3 mg Oral At Bedtime     [START ON 5/28/2019] pantoprazole  40 mg Oral Daily     predniSONE  20 mg Oral Daily     [START ON 5/29/2019] sulfamethoxazole-trimethoprim  1 tablet Oral Once per day on Mon Wed Fri

## 2019-05-27 NOTE — PLAN OF CARE
[id number]: Mildly hypertensive, SBP 150s-160s. Other VSS on RA. Abdominal/lower back pain managed adequately with tramadol x1. Denies nausea. Plasma exchange done at bedside this morning, tolerated well. Started PO prednisone today. Neuros intact, unchanged from previous. Reports slightly worse blurriness in R eye. R cheek rash worsened briefly around 1515, but improved by 1715 to within previously marked borders. Tolerated broth this evening without increased pain or nausea. Voiding spontaneously, not saving. No reported diarrhea today. Ambulating in halls independently. Plan is for plasma exchange on Wed and Fri this week. Continue with POC.

## 2019-05-28 ENCOUNTER — TELEPHONE (OUTPATIENT)
Dept: OPHTHALMOLOGY | Facility: CLINIC | Age: 49
End: 2019-05-28

## 2019-05-28 ENCOUNTER — APPOINTMENT (OUTPATIENT)
Dept: ULTRASOUND IMAGING | Facility: CLINIC | Age: 49
End: 2019-05-28
Attending: STUDENT IN AN ORGANIZED HEALTH CARE EDUCATION/TRAINING PROGRAM
Payer: COMMERCIAL

## 2019-05-28 ENCOUNTER — OFFICE VISIT (OUTPATIENT)
Dept: OPHTHALMOLOGY | Facility: CLINIC | Age: 49
End: 2019-05-28
Attending: OPHTHALMOLOGY
Payer: COMMERCIAL

## 2019-05-28 DIAGNOSIS — H46.9 OPTIC NEURITIS: ICD-10-CM

## 2019-05-28 DIAGNOSIS — H46.9 OPTIC NEURITIS: Primary | ICD-10-CM

## 2019-05-28 LAB
ALBUMIN SERPL-MCNC: 3.7 G/DL (ref 3.4–5)
ALP SERPL-CCNC: 36 U/L (ref 40–150)
ALT SERPL W P-5'-P-CCNC: 21 U/L (ref 0–50)
ANION GAP SERPL CALCULATED.3IONS-SCNC: 8 MMOL/L (ref 3–14)
AST SERPL W P-5'-P-CCNC: 15 U/L (ref 0–45)
BILIRUB DIRECT SERPL-MCNC: 0.1 MG/DL (ref 0–0.2)
BILIRUB SERPL-MCNC: 0.6 MG/DL (ref 0.2–1.3)
BUN SERPL-MCNC: 15 MG/DL (ref 7–30)
CALCIUM SERPL-MCNC: 8.4 MG/DL (ref 8.5–10.1)
CD19 CELLS # BLD: <1 CELLS/UL (ref 107–698)
CD19 CELLS NFR BLD: <1 % (ref 6–27)
CHLORIDE SERPL-SCNC: 105 MMOL/L (ref 94–109)
CO2 SERPL-SCNC: 29 MMOL/L (ref 20–32)
CREAT SERPL-MCNC: 0.67 MG/DL (ref 0.52–1.04)
ERYTHROCYTE [DISTWIDTH] IN BLOOD BY AUTOMATED COUNT: 13.6 % (ref 10–15)
FIBRINOGEN PPP-MCNC: 220 MG/DL (ref 200–420)
GFR SERPL CREATININE-BSD FRML MDRD: >90 ML/MIN/{1.73_M2}
GLUCOSE SERPL-MCNC: 87 MG/DL (ref 70–99)
HCT VFR BLD AUTO: 45.2 % (ref 35–47)
HGB BLD-MCNC: 14.6 G/DL (ref 11.7–15.7)
INR PPP: 1.11 (ref 0.86–1.14)
LIPASE SERPL-CCNC: 1030 U/L (ref 73–393)
MCH RBC QN AUTO: 30.7 PG (ref 26.5–33)
MCHC RBC AUTO-ENTMCNC: 32.3 G/DL (ref 31.5–36.5)
MCV RBC AUTO: 95 FL (ref 78–100)
PLATELET # BLD AUTO: 113 10E9/L (ref 150–450)
POTASSIUM SERPL-SCNC: 4 MMOL/L (ref 3.4–5.3)
PROT SERPL-MCNC: 5.6 G/DL (ref 6.8–8.8)
RBC # BLD AUTO: 4.76 10E12/L (ref 3.8–5.2)
SODIUM SERPL-SCNC: 141 MMOL/L (ref 133–144)
WBC # BLD AUTO: 11.9 10E9/L (ref 4–11)

## 2019-05-28 PROCEDURE — 83690 ASSAY OF LIPASE: CPT | Performed by: PSYCHIATRY & NEUROLOGY

## 2019-05-28 PROCEDURE — 99233 SBSQ HOSP IP/OBS HIGH 50: CPT | Performed by: STUDENT IN AN ORGANIZED HEALTH CARE EDUCATION/TRAINING PROGRAM

## 2019-05-28 PROCEDURE — 25000132 ZZH RX MED GY IP 250 OP 250 PS 637: Performed by: PSYCHIATRY & NEUROLOGY

## 2019-05-28 PROCEDURE — 85610 PROTHROMBIN TIME: CPT | Performed by: PSYCHIATRY & NEUROLOGY

## 2019-05-28 PROCEDURE — 80048 BASIC METABOLIC PNL TOTAL CA: CPT | Performed by: PSYCHIATRY & NEUROLOGY

## 2019-05-28 PROCEDURE — 86355 B CELLS TOTAL COUNT: CPT | Performed by: PSYCHIATRY & NEUROLOGY

## 2019-05-28 PROCEDURE — G0463 HOSPITAL OUTPT CLINIC VISIT: HCPCS | Mod: ZF | Performed by: TECHNICIAN/TECHNOLOGIST

## 2019-05-28 PROCEDURE — 25000132 ZZH RX MED GY IP 250 OP 250 PS 637: Performed by: STUDENT IN AN ORGANIZED HEALTH CARE EDUCATION/TRAINING PROGRAM

## 2019-05-28 PROCEDURE — 85027 COMPLETE CBC AUTOMATED: CPT | Performed by: PSYCHIATRY & NEUROLOGY

## 2019-05-28 PROCEDURE — 25000131 ZZH RX MED GY IP 250 OP 636 PS 637: Performed by: STUDENT IN AN ORGANIZED HEALTH CARE EDUCATION/TRAINING PROGRAM

## 2019-05-28 PROCEDURE — 80076 HEPATIC FUNCTION PANEL: CPT | Performed by: PSYCHIATRY & NEUROLOGY

## 2019-05-28 PROCEDURE — 12000001 ZZH R&B MED SURG/OB UMMC

## 2019-05-28 PROCEDURE — 92083 EXTENDED VISUAL FIELD XM: CPT | Mod: ZF | Performed by: OPHTHALMOLOGY

## 2019-05-28 PROCEDURE — 85384 FIBRINOGEN ACTIVITY: CPT | Performed by: PSYCHIATRY & NEUROLOGY

## 2019-05-28 PROCEDURE — 36415 COLL VENOUS BLD VENIPUNCTURE: CPT | Performed by: PSYCHIATRY & NEUROLOGY

## 2019-05-28 PROCEDURE — 80048 BASIC METABOLIC PNL TOTAL CA: CPT | Performed by: STUDENT IN AN ORGANIZED HEALTH CARE EDUCATION/TRAINING PROGRAM

## 2019-05-28 PROCEDURE — 76705 ECHO EXAM OF ABDOMEN: CPT

## 2019-05-28 RX ORDER — SIMETHICONE 40MG/0.6ML
40 SUSPENSION, DROPS(FINAL DOSAGE FORM)(ML) ORAL EVERY 6 HOURS PRN
Status: DISCONTINUED | OUTPATIENT
Start: 2019-05-28 | End: 2019-05-31 | Stop reason: HOSPADM

## 2019-05-28 RX ADMIN — PREDNISONE 20 MG: 20 TABLET ORAL at 07:54

## 2019-05-28 RX ADMIN — PANTOPRAZOLE SODIUM 40 MG: 40 TABLET, DELAYED RELEASE ORAL at 07:54

## 2019-05-28 RX ADMIN — CALCIUM 500 MG: 500 TABLET ORAL at 07:54

## 2019-05-28 RX ADMIN — CHOLECALCIFEROL CAP 125 MCG (5000 UNIT) 5000 UNITS: 125 CAP at 08:00

## 2019-05-28 RX ADMIN — Medication 1 MG: at 21:28

## 2019-05-28 RX ADMIN — MELATONIN TAB 3 MG 3 MG: 3 TAB at 21:27

## 2019-05-28 RX ADMIN — SIMETHICONE 40 MG: 20 SUSPENSION/ DROPS ORAL at 07:54

## 2019-05-28 RX ADMIN — TRAMADOL HYDROCHLORIDE 50 MG: 50 TABLET, COATED ORAL at 04:26

## 2019-05-28 ASSESSMENT — CONF VISUAL FIELD
OS_NORMAL: 1
OD_SUPERIOR_TEMPORAL_RESTRICTION: 3
METHOD: COUNTING FINGERS

## 2019-05-28 ASSESSMENT — ACTIVITIES OF DAILY LIVING (ADL)
ADLS_ACUITY_SCORE: 9

## 2019-05-28 ASSESSMENT — PAIN DESCRIPTION - DESCRIPTORS
DESCRIPTORS: OTHER (COMMENT)
DESCRIPTORS: DISCOMFORT

## 2019-05-28 ASSESSMENT — REFRACTION_WEARINGRX
OD_AXIS: 100
OS_SPHERE: -8.00
OS_AXIS: 077
OS_CYLINDER: +1.50
OD_CYLINDER: +1.00
OD_SPHERE: -7.75

## 2019-05-28 ASSESSMENT — CUP TO DISC RATIO
OS_RATIO: 0.4
OD_RATIO: 0.4

## 2019-05-28 ASSESSMENT — TONOMETRY
IOP_METHOD: ICARE
OS_IOP_MMHG: 15
OD_IOP_MMHG: 17

## 2019-05-28 ASSESSMENT — VISUAL ACUITY
OS_CC: 20/20
METHOD: SNELLEN - LINEAR
OD_CC: 20/100
CORRECTION_TYPE: GLASSES
OD_CC+: +1

## 2019-05-28 ASSESSMENT — EXTERNAL EXAM - RIGHT EYE: OD_EXAM: NORMAL

## 2019-05-28 ASSESSMENT — SLIT LAMP EXAM - LIDS
COMMENTS: NORMAL
COMMENTS: NORMAL

## 2019-05-28 ASSESSMENT — MIFFLIN-ST. JEOR: SCORE: 1375.62

## 2019-05-28 ASSESSMENT — EXTERNAL EXAM - LEFT EYE: OS_EXAM: NORMAL

## 2019-05-28 NOTE — PROGRESS NOTES
"[facility], [place]   Neurology Daily Note  Kerrie Patel  [id number]  05/28/2019    Subjective: Continued to have some abdominal bloating/fullness yesterday. No nausea. Had diarrhea x2. No nausea or vomiting. Right eye blurriness is about the same, reports she is better able to see the color blue.    Objective   /88 (BP Location: Left arm)   Pulse 63   Temp 96.2  F (35.7  C) (Oral)   Resp 16   Ht 1.55 m (5' 1.02\")   Wt 80.8 kg (178 lb 1.6 oz)   SpO2 95%   BMI 33.63 kg/m     General: Sitting in bed, appears comfortable.   HEENT: Normocephalic atraumatic   Cardiac: RRR, S1/S2, no m/r/g  Chest: No respiratory distress, clear to auscultation.   Abdomen: Soft, non-tender to palpation, BS +  Extremities: No bilateral lower extremity edema   Skin:  No lesions.   Psych:  Mood and affect stable.     Neuro:  Mental status: Alert, attentive, oriented. Follows commands. Speech fluent and comprehension intact.   Cranial nerves:  Eyes conjugate, pupils dilated post-ophthalmology, patient still unable to distinguish red but able to distinguish blue, reports inability to see fingers clearly with VFF in the right eye, no facial asymmetry, facial sensation intact, tongue midline, shoulder shrug and SCM strong  Motor: Tone normal. No atrophy. Subtle left pronator drift otherwise strength testing 5 out of 5 throughout.   Reflexes: 2+ reflexes in biceps, brachioradialis and knees. Toes downgoing bilaterally.  Sensory: Intact to light touch throughout   Coordination: FNF, HTS and GINETTE intact  Gait: Not tested     Investigations    CMP   Recent Labs   Lab 05/28/19  0553 05/27/19  0453 05/26/19  0940 05/25/19  0604 05/24/19  0552  05/22/19  1357    138  --  140 140   < > 140   POTASSIUM 4.0 4.4  --  4.2 4.3   < > 4.1   CHLORIDE 105 105  --  106 106   < > 107   CO2 29 28  --  26 27   < > 28   ANIONGAP 8 5  --  7 7   < > 6   GLC 87 148*  --  175* 167*   < > 128*   BUN 15 27  --  28 24   < > 15 "   CR 0.67 0.68  --  0.69 0.62   < > 0.63   GFRESTIMATED >90 >90  --  >90 >90   < > >90   GFRESTBLACK >90 >90  --  >90 >90   < > >90   LISA 8.4* 8.1*  --  8.6 8.7   < > 9.1   MAG  --   --   --   --   --   --  2.6*   PROTTOTAL 5.6* 4.7* 5.4*  --   --   --  6.5*   ALBUMIN 3.7 3.5 4.1  --   --   --  3.5   BILITOTAL 0.6 0.6 0.7  --   --   --  0.2   ALKPHOS 36* 14* 14*  --   --   --  59   AST 15 6 6  --   --   --  13   ALT 21 17 16  --   --   --  31    < > = values in this interval not displayed.      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  Exam is notable for a right afferent pupillary defect, reduced right visual acuity, and a left pronator drift in the LUE.  She was admitted as a direct admit for five rounds of PLEX and high dose steroids. She has had prior complications of pancreatitis and hallucinations with Solu-Medrol 1000 mg daily and developed panreatitis again during this hospitalization. Therefore Solu-Medrol was discontinued on 5/26.      #MOG-opathy   #Recurrent right optic neuritis  #Mild left-sided weakness  - Discontinued solu-medrol on 5/26 due to pancreatitis, she has had a previous history of pancreatitis following IV pulsed steroids.   - Given that she has been on long-term oral steroids prior to admission will restart prednisone at 20mg daily to prevent adrenal insufficiency   - GI prophylaxis with pantoprazole while on steroids  - Continue PCP  prophylaxis with Bactrim MWF  - Plasma exchange 4/5 tomorrow with daily INR and fibrinogen, appreciate transfusion medicine assistance  - Ophthalmology following, appreciate assistance  - Tylenol for headaches  - Zofran and Compazine for nausea  - Continue vitamin D 5000 units daily  - Continue calcium carbonate 500 mg daily  - S/p MRI C and T spine with and without contrast, stable and no new lesions     #Pancreatitis  Likely 2/2 the high dose steroids. Lipase significantly increased with abdominal pain and up-trending. Reported abdominal pain and bloating with x2 diarrhea yesterday, therefore will get ultrasound abdomen today.   - Medicine consult, appreciate recs  - ADAT   - Monitor CBC, BMP and Lipase  - Tramadol 50 mg every 6 hours PRN for pain     #Leukocytosis, improving   Suspected secondary to high dose steroids, down-trended after discontinuing IV steroids. Will monitor for signs and symptoms of infection.   -If fever, will obtain UA, blood cultures, and CT A/P w/IV contrast    #Hyperglycemia secondary to steroids, improved  Blood glucose levels normalizing after discontinuing IV steroids. No history of diabetes. Continue to monitor.    #Elevated BPs  - Also improved following discontinuing IV steroids    #Insomnia    - Melatonin 3 mg nightly     FEN: Regular diet   PPx: SCDs, ambulation   Code: Full      Patient was seen and discussed with Dr. Lord.    Christi Domingo  Neurology Resident, PGY2  Pager: [phone]    Attending physician: I saw and evaluated the patient with the resident team and I agree with the findings and plan of care as per Dr. Domingo above. Active issues include:    1) Recurrent right optic neuritis in the setting of anti-MOG antibody  Patient was seen by Dr. Kaur in ophthalmology today and visual acuity is stable at 20/100 on the right since admission (neither better or worse). I reassured patient that there remains potential for improvement during or subsequent to ongoing  course of plasma exchanges. Will check CD19 count today as suggested by Dr. Kaur, continuing prednisone 20 mg daily for now. Plasma exchange 4/5 tomorrow.    2) Pancreatitis  Presumed secondary to high dose steroids. No evidence of biliary obstruction on ultrasound today. Patient had increased abdominal pain last night and lipase elevated to ~1000, but tolerating liquid diet so far today. If she continues to worsen clinically we have to consider backing off on steroids, although again I think this is more likely related to pulse steroids rather than the smaller oral dose.    Robbin Mazariegos MD   of Neurology

## 2019-05-28 NOTE — PLAN OF CARE
Alert and oriented X 4. AVSS. Denies nausea, headache or dizziness. Blurred vision persists in right eye. Pt c/o a band of pain across upper abdomen and mid back. She describes the pain as 'gas bubble'. Tramadol given. Physician updated. Pt has been asleep most of the night.

## 2019-05-28 NOTE — PLAN OF CARE
"/70 (BP Location: Left arm)   Pulse 53   Temp 97  F (36.1  C) (Oral)   Resp 18   Ht 1.55 m (5' 1.02\")   Wt 83.7 kg (184 lb 9.6 oz)   SpO2 95%   BMI 34.85 kg/m      AVSS. Patient denies pain, nausea, and SOB. Reports abdominal fullness and gas with diarrhea 2x, MD aware. Prior rash on R side of face resolved, no new rashes noted. No other new s/sx. R eye blurriness remains.     Will continue to monitor/assess. Resting in room between cares.  "

## 2019-05-28 NOTE — PROGRESS NOTES
Assessment & Plan     Kerrie Patel is a 49 year old female with the following diagnoses:   1. Optic neuritis       Here for f/u of flare up of right optic neuritis.     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 Was on prednisone taper (on last day of 5mg) when she noticed some headache and right eye pain (mid May). Her prednisone was increased back up to 20mg but symptoms were not improving. MRI orbits showed some right mild enhancement of the optic nerve. She was then given 3 days of high dose oral steroids and placed on 60mg oral steroids.     She was admitted to the hospital 5/22/19 and started plasmapharesis (status post 3 / 5 doses). She was given 500mg IV solumedrol twice a day. Her lipase was elevated 5/26/19. Her IV methylpred was discontinued and she was given 20mg or oral prednisone instead (started 5/27/19).     She is status post rituximab infusions (March, April). Next one was scheduled in September.     In terms of her vision, she feels like she can't see color well. She feels like maybe it's slightly blurrier in the right eye but could be weather related.     Lab results  Normal: treponema, ANCA, ACE, IgG subclasses, vasculitis panel, NMO, KIM, ESR, B12, CRP, Quant Gold  Abnormal: MOG 1:20 titer      Imaging: MRI thoracic and cervical 2/2019 unremarkable. Per patient, Dr. Barragan saw 2 lesions in her spine that the radiologist did not see.     On examination, her visual acuity is 20/100 right eye and 20/20 left eye. Color plates 0/11 right eye and 11/11 left eye. Anterior segment examination shows normal eyelid, no conjunctival injection/chemosis. There was no anterior segment or anterior vitreous cells. Fundus examination showed pallor right optic nerve. Left optic nerve appears healthy. LVC shows generalized depression right eye (stable).     It is my impression that Kerrie has recurrence of right optic neuritis (MOG+). Her examination has  remained stable (no improvement, no worsening). She is status post 3/5 PLEX treatments. Continue on oral pred 20mg per day. I will ask Dr. Lord if her CD count has been checked to assess her immunosuppression and whether she needs more rituximab sooner than September.      return to clinic 3 days (Friday with Dr. Mckinley) with refraction.           Attending Physician Attestation:  Complete documentation of historical and exam elements from today's encounter can be found in the full encounter summary report (not reduplicated in this progress note).  I personally obtained the chief complaint(s) and history of present illness.  I confirmed and edited as necessary the review of systems, past medical/surgical history, family history, social history, and examination findings as documented by others; and I examined the patient myself.  I personally reviewed the relevant tests, images, and reports as documented above.  I formulated and edited as necessary the assessment and plan and discussed the findings and management plan with the patient and family. - Javi Mckinley MD  Neuro-ophthalmology fellow

## 2019-05-28 NOTE — PROGRESS NOTES
[facility], [place]    Medicine Progress Note - Hospitalist Consultation Service, [facility]       Date of Admission:  5/22/2019  Assessment & Plan   Kerrie Patel is a 49 year old female admitted on 5/22/2019. 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  Currently on neurology service being treated with high dose steroids and PLEX without substantial       #Acute mild pancreatitis   #Acute abdominal pain and nausea 2/2 above    Pt w/ prior episode of pancreatitis 1/2019 at [facility] felt to be 2/2 high dose steroids for treatment of optic neuritis. CT A/P at that time with acute pancreatitis. Currently admitted for recurrent R optic neuritis and being treated with high dose steroids.      It is uncommon for steroids to cause pancreatitis, although this appears to have happened in a stereotyped fashion twice for this patient; some further workup would be warranted.    - would get abdominal ultrasound to ensure common bile duct is normal.   -there is not a need to trend lipase  - ADAT   -pain control and antiemetics     #Recurrent right optic neuritis   #Mild left sided weakness   #Anti-MOGopathy  Currently being treated with  steroids and PLEX.  Transfusion medicine and neurology following.    -Care per their team.        #Hypertension  No hx of HTN in chart, elevated to SBP 160s likely due to steroids.  Remains asymptomatic, expect improvement once steroids weaned off.    - Can offer PRN anti-HTN agents (IV Hydralazine) if needed for SBP >180s      #Leuckocytosis, multifactorial, Improving      #Hypofibrinogenemia   Likely due to PLEX  - management per Transfusion medicine     #Steroid induced  "hyperglycemia  Glucoses ranging 167-175.  No hx DM, lat Hgb A1C 5/7 (1/23/2019).  No current need for insulin.   - If glucoses conitnue to be elevated persistently, can order \"low\" sliding scale Novolog and check glucoses more frequently     The patient's care was discussed with the Patient and Primary team.    Broderick Song MD  Hospitalist Service, [address], [place]  Pager: [phone]  Please see sticky note for cross cover information  ______________________________________________________________________    Interval History     Continues to have intermittent band like pain across her upper abdomen, nausea much improved.   Hungry.  No improvement in her eye symptoms.    4 point review of systems otherwise negative.      Data reviewed today: I reviewed all medications, new labs and imaging results over the last 24 hours. I personally reviewed no images or EKG's today.    Physical Exam   Vital Signs: Temp: 96.2  F (35.7  C) Temp src: Oral BP: 142/88 Pulse: 63 Heart Rate: 69 Resp: 16 SpO2: 95 % O2 Device: None (Room air)    Weight: 178 lbs 1.6 oz    General: Sitting up in bed, conversant, comfortable.   Resp: CTAB  CV: Normal rate, regular rhythm. No murmurs, rubs, or gallops   Abd: Soft, non-tender even to deep palpation.  No hepatosplenomegaly or mass.   Ext: trace lower extremity edema.   Neuro: alert and oriented    Data   Recent Labs   Lab 05/28/19  0625 05/28/19  0553 05/27/19  0842 05/27/19  0453 05/26/19  0940 05/25/19  0604  05/23/19  1415   WBC  --  11.9*  --  13.8*  --  17.4*   < >  --    HGB  --  14.6  --  13.1  --  14.8   < >  --    MCV  --  95  --  98  --  93   < >  --    PLT  --  113*  --  120*  --  155   < >  --    INR  --  1.11 1.67*  --   --   --   --  1.10   NA  --  141  --  138  --  140   < >  --    POTASSIUM  --  4.0  --  4.4  --  4.2   < >  --    CHLORIDE  --  105  --  105  --  106   < >  --    CO2  --  29  --  28  --  26   < >  --    BUN  --  15  --  27  -- "  28   < >  --    CR  --  0.67  --  0.68  --  0.69   < >  --    ANIONGAP  --  8  --  5  --  7   < >  --    LISA  --  8.4*  --  8.1*  --  8.6   < >  --    GLC  --  87  --  148*  --  175*   < >  --    ALBUMIN  --   --   --  3.5 4.1  --   --   --    PROTTOTAL  --   --   --  4.7* 5.4*  --   --   --    BILITOTAL  --   --   --  0.6 0.7  --   --   --    ALKPHOS  --   --   --  14* 14*  --   --   --    ALT  --   --   --  17 16  --   --   --    AST  --   --   --  6 6  --   --   --    LIPASE 1,030*  --   --  782* 677*  --    < >  --     < > = values in this interval not displayed.     No results found for this or any previous visit (from the past 24 hour(s)).  Medications       calcium carbonate 500 mg (elemental)  500 mg Oral Daily     cholecalciferol  5,000 Units Oral Daily     heparin  3 mL Intracatheter Q24H     heparin  5 mL Intracatheter Q24H     melatonin  3 mg Oral At Bedtime     pantoprazole  40 mg Oral Daily     predniSONE  20 mg Oral Daily     [START ON 5/29/2019] sulfamethoxazole-trimethoprim  1 tablet Oral Once per day on Mon Wed Fri

## 2019-05-28 NOTE — TELEPHONE ENCOUNTER
Facilitator will assist in rescheduling  Ruperto Medina RN 4:47 PM 05/28/19        [facility] Call Center    Phone Message    May a detailed message be left on voicemail: no    Reason for Call: Other: Please reschedule this patient's appointment on 5/31/19 to earlier in the day so it doesn't interfere with her plasma exchange procedure.         Action Taken: Message routed to:  [facility] ([facility] Eye

## 2019-05-28 NOTE — PROVIDER NOTIFICATION
Spoke with PLEX MD and team re: scheduled PLEX for Fri 05/31. Currently scheduled for 1230, with a newly scheduled eye clinic appointment at 1300. Per PLEX team, there are no openings in the morning, so appointment will remain at 1230. Eye clinic appointment will have to be rescheduled on 05/31. Neurology team notified to reschedule this appointment.

## 2019-05-29 LAB
BASOPHILS # BLD AUTO: 0 10E9/L (ref 0–0.2)
BASOPHILS NFR BLD AUTO: 0.1 %
DIFFERENTIAL METHOD BLD: ABNORMAL
EOSINOPHIL # BLD AUTO: 0.2 10E9/L (ref 0–0.7)
EOSINOPHIL NFR BLD AUTO: 1.3 %
ERYTHROCYTE [DISTWIDTH] IN BLOOD BY AUTOMATED COUNT: 13.5 % (ref 10–15)
FIBRINOGEN PPP-MCNC: 264 MG/DL (ref 200–420)
FIBRINOGEN PPP-MCNC: 64 MG/DL (ref 200–420)
HCT VFR BLD AUTO: 45.2 % (ref 35–47)
HGB BLD-MCNC: 14.7 G/DL (ref 11.7–15.7)
IMM GRANULOCYTES # BLD: 0.1 10E9/L (ref 0–0.4)
IMM GRANULOCYTES NFR BLD: 0.5 %
INR PPP: 1.09 (ref 0.86–1.14)
INR PPP: 1.43 (ref 0.86–1.14)
LYMPHOCYTES # BLD AUTO: 1.7 10E9/L (ref 0.8–5.3)
LYMPHOCYTES NFR BLD AUTO: 14 %
MCH RBC QN AUTO: 30.6 PG (ref 26.5–33)
MCHC RBC AUTO-ENTMCNC: 32.5 G/DL (ref 31.5–36.5)
MCV RBC AUTO: 94 FL (ref 78–100)
MONOCYTES # BLD AUTO: 0.8 10E9/L (ref 0–1.3)
MONOCYTES NFR BLD AUTO: 7 %
NEUTROPHILS # BLD AUTO: 9.3 10E9/L (ref 1.6–8.3)
NEUTROPHILS NFR BLD AUTO: 77.1 %
NRBC # BLD AUTO: 0 10*3/UL
NRBC BLD AUTO-RTO: 0 /100
PLATELET # BLD AUTO: 124 10E9/L (ref 150–450)
RBC # BLD AUTO: 4.81 10E12/L (ref 3.8–5.2)
WBC # BLD AUTO: 12 10E9/L (ref 4–11)

## 2019-05-29 PROCEDURE — 85610 PROTHROMBIN TIME: CPT | Performed by: PATHOLOGY

## 2019-05-29 PROCEDURE — 85384 FIBRINOGEN ACTIVITY: CPT | Performed by: STUDENT IN AN ORGANIZED HEALTH CARE EDUCATION/TRAINING PROGRAM

## 2019-05-29 PROCEDURE — 85610 PROTHROMBIN TIME: CPT | Performed by: STUDENT IN AN ORGANIZED HEALTH CARE EDUCATION/TRAINING PROGRAM

## 2019-05-29 PROCEDURE — 85025 COMPLETE CBC W/AUTO DIFF WBC: CPT | Performed by: PATHOLOGY

## 2019-05-29 PROCEDURE — P9041 ALBUMIN (HUMAN),5%, 50ML: HCPCS | Performed by: PATHOLOGY

## 2019-05-29 PROCEDURE — 25000132 ZZH RX MED GY IP 250 OP 250 PS 637: Performed by: STUDENT IN AN ORGANIZED HEALTH CARE EDUCATION/TRAINING PROGRAM

## 2019-05-29 PROCEDURE — 85384 FIBRINOGEN ACTIVITY: CPT | Performed by: PATHOLOGY

## 2019-05-29 PROCEDURE — 36415 COLL VENOUS BLD VENIPUNCTURE: CPT | Performed by: STUDENT IN AN ORGANIZED HEALTH CARE EDUCATION/TRAINING PROGRAM

## 2019-05-29 PROCEDURE — 25000128 H RX IP 250 OP 636: Performed by: PATHOLOGY

## 2019-05-29 PROCEDURE — 99232 SBSQ HOSP IP/OBS MODERATE 35: CPT | Performed by: STUDENT IN AN ORGANIZED HEALTH CARE EDUCATION/TRAINING PROGRAM

## 2019-05-29 PROCEDURE — 25000132 ZZH RX MED GY IP 250 OP 250 PS 637: Performed by: PSYCHIATRY & NEUROLOGY

## 2019-05-29 PROCEDURE — 25000131 ZZH RX MED GY IP 250 OP 636 PS 637: Performed by: STUDENT IN AN ORGANIZED HEALTH CARE EDUCATION/TRAINING PROGRAM

## 2019-05-29 PROCEDURE — 12000001 ZZH R&B MED SURG/OB UMMC

## 2019-05-29 PROCEDURE — 25000125 ZZHC RX 250: Performed by: PATHOLOGY

## 2019-05-29 PROCEDURE — 36514 APHERESIS PLASMA: CPT

## 2019-05-29 RX ORDER — HEPARIN SODIUM (PORCINE) LOCK FLUSH IV SOLN 100 UNIT/ML 100 UNIT/ML
3 SOLUTION INTRAVENOUS ONCE
Status: COMPLETED | OUTPATIENT
Start: 2019-05-29 | End: 2019-05-29

## 2019-05-29 RX ORDER — DIPHENHYDRAMINE HYDROCHLORIDE 50 MG/ML
50 INJECTION INTRAMUSCULAR; INTRAVENOUS
Status: CANCELLED | OUTPATIENT
Start: 2019-05-29

## 2019-05-29 RX ORDER — ALBUMIN HUMAN 25 %
2250 INTRAVENOUS SOLUTION INTRAVENOUS
Status: COMPLETED | OUTPATIENT
Start: 2019-05-29 | End: 2019-05-29

## 2019-05-29 RX ORDER — CALCIUM GLUCONATE 100 MG/ML
AMPUL (ML) INTRAVENOUS
Status: COMPLETED | OUTPATIENT
Start: 2019-05-29 | End: 2019-05-29

## 2019-05-29 RX ADMIN — CALCIUM GLUCONATE 3 G: 98 INJECTION, SOLUTION INTRAVENOUS at 14:18

## 2019-05-29 RX ADMIN — ANTICOAGULANT CITRATE DEXTROSE SOLUTION FORMULA A 615 ML: 12.25; 11; 3.65 SOLUTION INTRAVENOUS at 14:18

## 2019-05-29 RX ADMIN — ALBUMIN HUMAN 2250 ML: 0.05 INJECTION, SOLUTION INTRAVENOUS at 14:17

## 2019-05-29 RX ADMIN — CALCIUM 500 MG: 500 TABLET ORAL at 08:01

## 2019-05-29 RX ADMIN — POLYETHYLENE GLYCOL 3350 17 G: 17 POWDER, FOR SOLUTION ORAL at 22:35

## 2019-05-29 RX ADMIN — CHOLECALCIFEROL CAP 125 MCG (5000 UNIT) 5000 UNITS: 125 CAP at 08:01

## 2019-05-29 RX ADMIN — PREDNISONE 20 MG: 20 TABLET ORAL at 10:08

## 2019-05-29 RX ADMIN — Medication 3 ML: at 15:46

## 2019-05-29 RX ADMIN — PANTOPRAZOLE SODIUM 40 MG: 40 TABLET, DELAYED RELEASE ORAL at 08:01

## 2019-05-29 RX ADMIN — SULFAMETHOXAZOLE AND TRIMETHOPRIM 1 TABLET: 800; 160 TABLET ORAL at 10:08

## 2019-05-29 RX ADMIN — Medication 3 ML: at 15:47

## 2019-05-29 RX ADMIN — ACETAMINOPHEN 975 MG: 325 TABLET, FILM COATED ORAL at 22:35

## 2019-05-29 ASSESSMENT — ACTIVITIES OF DAILY LIVING (ADL)
ADLS_ACUITY_SCORE: 9

## 2019-05-29 NOTE — PLAN OF CARE
Afebrile, intermittently bradycardic, OVSS on RA.  Denies pain or nausea.  Tolerating regular diet.  Plasma exchange done today.   Voiding.  No BM this shift. Up in room independently.  Continue plan of care.

## 2019-05-29 NOTE — PROGRESS NOTES
[facility], [place]    Medicine Progress Note - Hospitalist Consultation Service, [place]        Date of Admission:  5/22/2019  Assessment & Plan   Kerrie Patel is a 49 year old female admitted on 5/22/2019. 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  Currently on neurology service being treated with high dose steroids and PLEX without substantial improvement.      #Acute mild pancreatitis   #Acute abdominal pain and nausea 2/2 above    Pt w/ prior episode of pancreatitis 1/2019 at [facility] felt to be 2/2 high dose steroids for treatment of optic neuritis. CT A/P at that time with acute pancreatitis. Currently admitted for recurrent R optic neuritis and being treated with  PLEX and steroids.       It is uncommon for steroids to cause pancreatitis, although this appears to have happened in a stereotyped fashion twice for this patient.  Ultrasound reveals 3.5mm common bile duct.    - ADAT  - wouldn't follow lipase  -consider GI referral as outpatient, could consider advanced imaging.    #Recurrent right optic neuritis   #Mild left sided weakness   #Anti-MOGopathy  Currently being treated with  steroids and PLEX.  Transfusion medicine and neurology following.    -Care per their team.     -hypofibrinogenemia complicating management.      #Hypertension - likely from steroids, improved, follow.      #Leuckocytosis, multifactorial, Improving      #Hypofibrinogenemia   Likely due to PLEX  - management per Transfusion medicine     #Steroid induced hyperglycemia - improving.     The patient's care was discussed with the Patient and Primary team.    Broderick Song MD  Hospitalist Service, [facility]  6  [facility], [place]  Pager: [phone]  Please see sticky note for cross cover information  ______________________________________________________________________    Interval History     Feels much better today.  Tolerating a diet well.  No real change in her eye symptoms.    4 point review of systems otherwise negative.      Data reviewed today: I reviewed all medications, new labs and imaging results over the last 24 hours. I personally reviewed no images or EKG's today.    Physical Exam   Vital Signs: Temp: 97.6  F (36.4  C) Temp src: Oral BP: 145/80 Pulse: 83 Heart Rate: 65 Resp: 20 SpO2: 98 % O2 Device: None (Room air)    Weight: 178 lbs 1.6 oz    General: Sitting up in bed, conversant, comfortable.   Resp: CTAB  CV: Normal rate, regular rhythm. No murmurs, rubs, or gallops   Abd: Soft, non-tender even to deep palpation.  No hepatosplenomegaly or mass.   Ext: trace lower extremity edema.   Neuro: alert and oriented    Data   Recent Labs   Lab 05/29/19  1300 05/29/19  0632 05/28/19  0625 05/28/19  0553  05/27/19  0453  05/25/19  0604   WBC 12.0*  --   --  11.9*  --  13.8*  --  17.4*   HGB 14.7  --   --  14.6  --  13.1  --  14.8   MCV 94  --   --  95  --  98  --  93   *  --   --  113*  --  120*  --  155   INR 1.09 1.43*  --  1.11   < >  --   --   --    NA  --   --   --  141  --  138  --  140   POTASSIUM  --   --   --  4.0  --  4.4  --  4.2   CHLORIDE  --   --   --  105  --  105  --  106   CO2  --   --   --  29  --  28  --  26   BUN  --   --   --  15  --  27  --  28   CR  --   --   --  0.67  --  0.68  --  0.69   ANIONGAP  --   --   --  8  --  5  --  7   LISA  --   --   --  8.4*  --  8.1*  --  8.6   GLC  --   --   --  87  --  148*  --  175*   ALBUMIN  --   --   --  3.7  --  3.5   < >  --    PROTTOTAL  --   --   --  5.6*  --  4.7*   < >  --    BILITOTAL  --   --   --  0.6  --  0.6   < >  --    ALKPHOS  --   --   --  36*  --  14*   < >  --    ALT  --   --   --  21  --  17   < >  --     AST  --   --   --  15  --  6   < >  --    LIPASE  --   --  1,030*  --   --  782*   < >  --     < > = values in this interval not displayed.     No results found for this or any previous visit (from the past 24 hour(s)).  Medications       calcium carbonate 500 mg (elemental)  500 mg Oral Daily     cholecalciferol  5,000 Units Oral Daily     heparin  3 mL Intracatheter Q24H     heparin  5 mL Intracatheter Q24H     melatonin  3 mg Oral At Bedtime     pantoprazole  40 mg Oral Daily     predniSONE  20 mg Oral Daily     sulfamethoxazole-trimethoprim  1 tablet Oral Once per day on Mon Wed Fri

## 2019-05-29 NOTE — PLAN OF CARE
Heart rate bradycardic at 52, all other VSS, afebrile. Denies pain, nausea., SOB. Up ad rudy. Scheduled melatonin 3 mg and PRN melatonin 1 mg given for sleep - pt slept throughout night. PLEX to take place today at 12:30 today. Pt on and tolerating full liquid diet.

## 2019-05-29 NOTE — PLAN OF CARE
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 A&Ox4. Neuros: R eye blurriness and red-color blind. Wears glasses at baseline. CMS intact, though reports generalized weakness. Up ad rudy and ambulating independently in room and halls. Denies pain or headache. No dizziness. Headache this AM endorsed but it has since gone away. Advanced to regular diet; tolerated grilled chicken breast and broccoli for dinner. Voids spontaneously. Last BM was 2 days ago; requested Miralax be given with apple juice. Afebrile. VSS on RA; intermittently bradycardic. L PIV SL'd. R neck double lumen IJ heparin locked after plasma exchange finished at 1545 this afternoon. Site a bit sore at HS; received some Tylenol for that discomfort. Receiving plasma exchange for autoimmune MOGopathy antibody (1/2019); Next PLEX is Friday at 12:30. Opthalmology appt Fri 5/31 at 1130 am; Wheelchair transport to [facility] eye clinic set up. Update General Neurology with changes and concerns.   "

## 2019-05-29 NOTE — PROCEDURES
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 Received TPE x 5 at an [facility] in [place] on February. Was on prednisone taper when she noticed right eye pain about 1 week ago. Couple days later, she noticed decreased vision of the right eye. Her prednisone was increased back up to 20mg but symptoms were not improving. Recent MRI shows subtle enhancement of the right optic nerve compatible with a history of optic neuritis. She is also status post rituximab infusions (March, April). A recent MOG titer 1:20.     Today she had 4/5 TPE. Her fibrinogen level had been trending down received a portion of plasma with TPE #3.  The check in between had normalized.  This AM, suspected errant result of 64, before starting we confirmed that this was likely an error or bad sample, redraw demonstrated normal value of 264.  We will complete the remaining exchanges using albumin for the replacement.  No need to check additional INR or Fibrinogen with AM labs.  We will draw any labs we need from her line at the time of our procedure.    She tolerated the procedure well without complication, she notes that she feels her vision is a little better.  Feeling well today.  She denies nausea, vomiting, fevers, chills, diarrhea     - CVC vascular access  - ACD-A for anticoagulation. Will give calcium gluconate in the return to offset effects and monitor iCa.  - Do not start ACE inhibitors throughout the duration of the TPE series as these have been associated with reactions during apheresis.    - Notify the Transfusion  Medicine physician of any upcoming procedures, surgeries, or biopsies as TPE with albumin replacement will affect coagulation factor levels.  - Consider the impact of TPE on laboratory studies and medication levels.  IVIG and other immune therapies such as rituximab should NOT be given immediately prior to TPE but rather after due to removal.            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 Received TPE x 5 at an [facility] in [place] on February. Was on prednisone taper when she noticed right eye pain about 1 week ago. Couple days later, she noticed decreased vision of the right eye. Her prednisone was increased back up to 20mg but symptoms were not improving. Recent MRI shows subtle enhancement of the right optic nerve compatible with a history of optic neuritis. She is also status post rituximab infusions (March, April).                 Past Medical History:     Past Medical History:   Diagnosis Date     Diffuse cystic mastopathy of breast     No Comments Provided     Family history of diabetes mellitus     No Comments Provided     Gastro-esophageal reflux disease without esophagitis     No Comments Provided     Pain in thoracic spine     No Comments Provided             Past Surgical History:     Past Surgical History:   Procedure Laterality Date      SECTION      96, 99     CHOLECYSTECTOMY      2003     OTHER SURGICAL HISTORY      2012,XCN650,PREMALIG/BENIGN SKIN LESION EXCISION,BREAST LESION     OTHER SURGICAL HISTORY      2017,54299.0,RI TLH W TUBES/OVAR 250 G OR LESS              Social History:     Social History     Tobacco Use     Smoking status: Never Smoker     Smokeless tobacco: Never Used   Substance Use Topics     Alcohol use: No     Alcohol/week: 0.0 oz     Comment: rare            Allergies:     Allergies   Allergen  Reactions     Amoxicillin Nausea and Vomiting     Lactose      Other reaction(s): GI Bleeding             Medications:     Current Facility-Administered Medications   Medication     acetaminophen (TYLENOL) tablet 975 mg     bisacodyl (DULCOLAX) Suppository 10 mg     calcium carbonate 500 mg (elemental) (OSCAL;OYSTER SHELL CALCIUM) tablet 500 mg     cholecalciferol (VITAMIN D3) 5000 units (125 mcg) capsule 5,000 Units     glucose gel 15-30 g    Or     dextrose 50 % injection 25-50 mL    Or     glucagon injection 1 mg     diphenhydrAMINE (BENADRYL) capsule 25 mg     heparin 100 UNIT/ML injection 3 mL     heparin 100 UNIT/ML injection 3 mL     heparin 100 UNIT/ML injection 5 mL     heparin 100 UNIT/ML injection 5 mL     lidocaine (LMX4) cream     lidocaine 1 % 0.1-1 mL     magnesium sulfate 4 g in 100 mL sterile water (premade)     melatonin tablet 1 mg     melatonin tablet 3 mg     naloxone (NARCAN) injection 0.1-0.4 mg     ondansetron (ZOFRAN) injection 4 mg    Or     ondansetron (ZOFRAN) tablet 4 mg     pantoprazole (PROTONIX) EC tablet 40 mg     polyethylene glycol (MIRALAX/GLYCOLAX) Packet 17 g     potassium chloride (KLOR-CON) Packet 20-40 mEq     potassium chloride 10 mEq in 100 mL intermittent infusion with 10 mg lidocaine     potassium chloride 10 mEq in 100 mL sterile water intermittent infusion (premix)     potassium chloride 20 mEq in 50 mL intermittent infusion     potassium chloride ER (K-DUR/KLOR-CON M) CR tablet 20-40 mEq     predniSONE (DELTASONE) tablet 20 mg     prochlorperazine (COMPAZINE) tablet 5 mg     senna-docusate (SENOKOT-S/PERICOLACE) 8.6-50 MG per tablet 1 tablet    Or     senna-docusate (SENOKOT-S/PERICOLACE) 8.6-50 MG per tablet 2 tablet     simethicone (MYLICON) suspension 40 mg     sodium chloride (PF) 0.9% PF flush 10 mL     sodium chloride (PF) 0.9% PF flush 10-20 mL     sulfamethoxazole-trimethoprim (BACTRIM DS/SEPTRA DS) 800-160 MG per tablet 1 tablet     traMADol (ULTRAM) tablet 50  "mg             Review of Systems:   See above           Exam:   /80   Pulse 58   Temp 96.7  F (35.9  C) (Oral)   Resp 20   Ht 1.55 m (5' 1.02\")   Wt 80.8 kg (178 lb 1.6 oz)   SpO2 98%   BMI 33.63 kg/m    Alert, no apparent distress  Breathing appears comfortable on room air.  Central line accessed for the procedure.            Data:     ROUTINE LABS  CMP  Recent Labs   Lab 05/28/19  0553 05/27/19  0453 05/26/19  0940 05/25/19  0604 05/24/19  0552    138  --  140 140   POTASSIUM 4.0 4.4  --  4.2 4.3   CHLORIDE 105 105  --  106 106   CO2 29 28  --  26 27   ANIONGAP 8 5  --  7 7   GLC 87 148*  --  175* 167*   BUN 15 27  --  28 24   CR 0.67 0.68  --  0.69 0.62   GFRESTIMATED >90 >90  --  >90 >90   GFRESTBLACK >90 >90  --  >90 >90   LISA 8.4* 8.1*  --  8.6 8.7   PROTTOTAL 5.6* 4.7* 5.4*  --   --    ALBUMIN 3.7 3.5 4.1  --   --    BILITOTAL 0.6 0.6 0.7  --   --    ALKPHOS 36* 14* 14*  --   --    AST 15 6 6  --   --    ALT 21 17 16  --   --      CBC  Recent Labs   Lab 05/29/19  1300 05/28/19  0553 05/27/19  0453 05/25/19  0604   WBC 12.0* 11.9* 13.8* 17.4*   RBC 4.81 4.76 4.27 4.85   HGB 14.7 14.6 13.1 14.8   HCT 45.2 45.2 41.9 45.3   MCV 94 95 98 93   MCH 30.6 30.7 30.7 30.5   MCHC 32.5 32.3 31.3* 32.7   RDW 13.5 13.6 13.6 13.4   * 113* 120* 155     INR  Recent Labs   Lab 05/29/19  1300 05/29/19  0632 05/28/19  0553 05/27/19  0842   INR 1.09 1.43* 1.11 1.67*             Procedure Summary:   A single volume therapeutic plasma exchange was performed and 5% albumin was used as the replacement fluid.  A dual lumen central line was used for access and allowed for appropriate flow during the procedure.  ACD-A was used for anticoagulation. To offset the effects of the citrate, calcium gluconate was given in the return line.  The patient's vital signs were stable throughout.  The patient tolerated the procedure well without complication.  See apheresis flowsheet for additional details.        Attestation: " During the procedure the patient was directly seen and evaluated by me, Broderick Sandoval MD.    Broderick Sandoval MD  Transfusion Medicine Attending  Laboratory Medicine & Pathology  Pager: [phone]

## 2019-05-30 PROCEDURE — 25000132 ZZH RX MED GY IP 250 OP 250 PS 637: Performed by: STUDENT IN AN ORGANIZED HEALTH CARE EDUCATION/TRAINING PROGRAM

## 2019-05-30 PROCEDURE — 99232 SBSQ HOSP IP/OBS MODERATE 35: CPT | Performed by: STUDENT IN AN ORGANIZED HEALTH CARE EDUCATION/TRAINING PROGRAM

## 2019-05-30 PROCEDURE — 25000132 ZZH RX MED GY IP 250 OP 250 PS 637: Performed by: PSYCHIATRY & NEUROLOGY

## 2019-05-30 PROCEDURE — 25000131 ZZH RX MED GY IP 250 OP 636 PS 637: Performed by: STUDENT IN AN ORGANIZED HEALTH CARE EDUCATION/TRAINING PROGRAM

## 2019-05-30 PROCEDURE — 12000001 ZZH R&B MED SURG/OB UMMC

## 2019-05-30 RX ORDER — DIPHENHYDRAMINE HYDROCHLORIDE 50 MG/ML
50 INJECTION INTRAMUSCULAR; INTRAVENOUS
Status: CANCELLED | OUTPATIENT
Start: 2019-05-30

## 2019-05-30 RX ADMIN — CHOLECALCIFEROL CAP 125 MCG (5000 UNIT) 5000 UNITS: 125 CAP at 09:11

## 2019-05-30 RX ADMIN — PANTOPRAZOLE SODIUM 40 MG: 40 TABLET, DELAYED RELEASE ORAL at 09:11

## 2019-05-30 RX ADMIN — CALCIUM 500 MG: 500 TABLET ORAL at 09:11

## 2019-05-30 RX ADMIN — PREDNISONE 20 MG: 20 TABLET ORAL at 10:45

## 2019-05-30 RX ADMIN — MELATONIN TAB 3 MG 3 MG: 3 TAB at 22:27

## 2019-05-30 RX ADMIN — SENNOSIDES AND DOCUSATE SODIUM 1 TABLET: 8.6; 5 TABLET ORAL at 14:06

## 2019-05-30 ASSESSMENT — ACTIVITIES OF DAILY LIVING (ADL)
ADLS_ACUITY_SCORE: 9

## 2019-05-30 ASSESSMENT — MIFFLIN-ST. JEOR: SCORE: 1345.23

## 2019-05-30 NOTE — DISCHARGE SUMMARY
"[facility]  NEUROLOGY DISCHARGE SUMMARY    Patient Name:  Kerrie Patel  MRN:  [id number]      :  1970      Date of Admission:  2019  Date of Discharge::  2019  Admitting Physician:  Janet Ferris MD  Discharge Physician:  Robbin Lord MD   Primary Care Provider:   Coby Higgins  Discharge Disposition:   Discharged to home    Admission Diagnoses:  1.  MOG-IgG associated recurrent optic neuritis (NMOSD)    Discharge Diagnoses:    1.  MOG-IgG associated recurrent optic neuritis (NMOSD)  2.  Pancreatitis secondary to IV methylprednisolone (patient has unusual reaction to high dose steroids, tolerates low dose but caution should be used with further high dose steroids), likely would re-initiate PLEX if recurrence of NMOSD sx    Brief History of Illness:   Per H&P dated 19:  \"49 year old female h/o right optic neuritis from anti-MOGopathy (2019), hx left sided weakness, high-dose steroid induced pancreatitis and hallucinations who presents with recurrent right optic neuropathy/neuritis.  She is a patient of Dr. Barragan and neuro immunology and Dr. Kaur in neuro-ophthalmology who presents as a direct admit from Dr. Kaur's clinic.  Please refer to their notes for complete details. in brief she first developed right eye pain and visual changes and a rapid course over 48 hours on 2019.  She was diagnosed with optic neuritis. She was initially managed in [place] and treated with high-dose IV steroids with development of pancreatitis.  She had persistent visual symptoms and underwent plasma exchange between late January and early 2019, which she thought gave some benefit.  She establish care with [facility] and was subsequently found to have MOG antibodies. She was previously admitted in 2019 for methylpred and rituximab with for ongoing right vision impairment and left sided weakness.  She then underwent a second " dose of rituximab approximately 2 weeks after discharge in March 2019.  After treatment with high-dose steroids and rituximab she had an improvement in her right vision.  She continued on a prednisone taper and on 4/25/2019 she followed up with Dr. Barragan who continued to taper with plans to discontinue prednisone altogether.  She was stable and doing well at that time.  She followed up with Dr. Kaur who noted that her visual acuity had improved significantly in her right eye to 20/20-1 although she still had 1/11 color plates in the right eye and a persistent afferent pupillary defect in the right eye.  She also had abnormal visual field testing.  In 5/15/2019 she followed up with neuro-ophthalmology when she experienced return of her right visual symptoms after completing the prednisone taper previously prescribed.  She had recurrent right sided head pain and eye pain.  She then developed decreased vision.  Her root visual acuity decreased from 20/20 to 20/100-1 and 0/11 color plates right eye and she was placed on 1250 mg prednisone oral x3 days.  She returned to neuro-ophthalmology clinic today and she had no improvement of her visual acuity after outpatient oral steroids and she was admitted directly to neurology service at the [facility].     In brief she has right decreased visual acuity but no orbital pain or pain with eye movement.  She has a dull throbbing headache on the right side of her head including her eye lower face and is spreading to the left lower face which she rates at a 4/10.  She occasionally gets a sharp stabbing pain that goes through the top of her right skull to her lower face and then shoots across to the left side of her face.  The sharp stabbing pains last only a few seconds but have been occurring multiple times a day.  She is using extra strength Tylenol for management of head pain with some benefit.  She also feels a heaviness in her left arm and leg although no  "objective weakness was noted by Dr. Barragan on 5/15/2019.  She denies numbness or tingling.  She has no dysphagia, dysarthria, eyelid weakness, or facial weakness.  No constipation or urinary retention.     Previous treatment history is notable for development of pancreatitis with high-dose methylprednisolone 1000 mg daily and visual hallucination with high-dose methylprednisolone.  After these 2 adverse events she was placed on divided methylprednisolone dosing with twice daily 500 mg IV with better tolerance.    Of note prior imaging February 2019 shows T2 hyperintense lesions in brain, cervical and thoracic spine as well as an area of anterior cervical enhancement that was not reported by radiology.  Imaging obtained 5/16/2019 shows mild right optic nerve enhancement.\"    Hospital Course:  Mr Patel was admitted for treatment of her optic neuritis with IV methylprednisolone and PLEX. Unfortunately, she developed pancreatitis due to the methylprednsione and it was stopped on day 4. She was kept NPO and treated with IV fluids and PRN analgesia with improvement. She was then able to tolerate a regular diet and symptoms of abdominal pain, nausea and diarrhea improved. She was continued on 20mg of prednisone daily.    She completed 5 rounds of PLEX treatment with no complications. She was following by the neuro-ophthalmology clinic during her hospital stay. Her eye examination had improved but still with significant deficits by time of discharge, see ophthalmology note for further details.    She was discharged home with plans for follow up with neuro-ophthalmology, neurology (neuro-immunology clinic) and GI.     Pertinent Investigations:  MR CERVICAL SPINE W/O & W CONTRAST, MR THORACIC SPINE W/O  & W CONTRAST 5/22/2019 8:30 PM  Impression:   1. Cervical spine: No abnormal enhancement or cord abnormality. Mild  multilevel cervical spondylosis most prominent at C6-7.  2. Thoracic spine: No abnormal enhancement, cord " abnormality, or  spinal canal or foraminal stenosis.    US Abdomen Limited 5/28/19  IMPRESSION:   The pancreas is poorly visualized on this exam. If clinical concern  for complications related to pancreatitis is suspected, a CT abdomen  and pelvis may be beneficial.    Lipase: 97--> 677--> 782--> 1,030  Triglycerides - 123  CD19 <1    Consultations:    General medicine - for pancreatitis  Transfusion medicine - for PLEX  Interventional radiology - for CVC placement       Review of your medicines      START taking      Dose / Directions   predniSONE 20 MG tablet  Commonly known as:  DELTASONE  Replaces:  predniSONE 10 MG (48) Tbpk      Dose:  20 mg  Take 1 tablet (20 mg) by mouth daily  Quantity:  30 tablet  Refills:  1        CONTINUE these medicines which may have CHANGED, or have new prescriptions. If we are uncertain of the size of tablets/capsules you have at home, strength may be listed as something that might have changed.      Dose / Directions   * pantoprazole 40 MG EC tablet  Commonly known as:  PROTONIX  This may have changed:  Another medication with the same name was added. Make sure you understand how and when to take each.  Used for:  Demyelinating disease of the spinal cord (H)      Dose:  40 mg  Take 1 tablet (40 mg) by mouth daily Take daily as long as you are taking steroids (e.g. Prednisone). Can stop after being tapered off of steroids but discuss this with your doctor before discontinuing.  Quantity:  30 tablet  Refills:  5     * pantoprazole 40 MG EC tablet  Commonly known as:  PROTONIX  This may have changed:  You were already taking a medication with the same name, and this prescription was added. Make sure you understand how and when to take each.      Dose:  40 mg  Start taking on:  6/1/2019  Take 1 tablet (40 mg) by mouth daily  Quantity:  60 tablet  Refills:  1     * sulfamethoxazole-trimethoprim 800-160 MG tablet  Commonly known as:  BACTRIM DS/SEPTRA DS  Indication:  PJP  prophylaxis  This may have changed:  Another medication with the same name was added. Make sure you understand how and when to take each.  Used for:  Demyelinating disease of the spinal cord (H)      Dose:  1 tablet  Take 1 tablet by mouth three times a week For 1 months  Quantity:  24 tablet  Refills:  0     * sulfamethoxazole-trimethoprim 800-160 MG tablet  Commonly known as:  BACTRIM DS/SEPTRA DS  This may have changed:  You were already taking a medication with the same name, and this prescription was added. Make sure you understand how and when to take each.      Dose:  1 tablet  Start taking on:  6/3/2019  Take 1 tablet by mouth twice a week  Quantity:  30 tablet  Refills:  1         * This list has 4 medication(s) that are the same as other medications prescribed for you. Read the directions carefully, and ask your doctor or other care provider to review them with you.            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)      Dose:  5000 Units  Take 5,000 Units by mouth daily  Quantity:  30 tablet  Refills:  11     melatonin 1 MG Tabs tablet      Dose:  1 mg  Take 1 mg by mouth nightly as needed for sleep  Refills:  0        STOP taking    HYDROcodone-acetaminophen 5-325 MG tablet  Commonly known as:  NORCO        omeprazole 20 MG DR capsule  Commonly known as:  priLOSEC        predniSONE 10 MG (48) Tbpk  Replaced by:  predniSONE 20 MG tablet              Where to get your medicines      These medications were sent to [facility] [place], [place] - [address]  [address] [address]    Phone:  [phone]     pantoprazole 40  MG EC tablet    predniSONE 20 MG tablet    sulfamethoxazole-trimethoprim 800-160 MG tablet         Medication changes:  Prednisone 20mg daily    Discharge physical examination:   Vitals:  B/P: 139/87, T: 97.6, P: 83, R: 16   General:  Adult, in NAD, cooperative  HEENT:  NC/AT, no icterus, op pink and moist, no ear or nose drainage.   Cardiac:  RRR, no m/r/g  Chest:  CTAB  Abdomen:  S/NT/ND  Extremities:  No LE swelling.    Skin:  No rash or lesion.    Psych:  Mood pleasant, affect congruent  Neuro:  Mental status: Alert, attentive, oriented. Follows commands. Speech fluent and comprehension intact.   Cranial nerves:  Eyes conjugate, relative afferent pupillary defect on the right, patient still unable to distinguish red but able to distinguish blue and green, VFF, no facial asymmetry, facial sensation intact, tongue midline, shoulder shrug and SCM strong  Motor: Tone normal. No atrophy. Subtle left pronator drift otherwise strength testing 5/5 throughout.   Reflexes: 2+ reflexes in biceps, brachioradialis and knees. Toes downgoing bilaterally.  Sensory: Intact to light touch throughout   Coordination: FNF intact  Gait: Not tested         Discharge follow up and instructions:    1.  Diet: Regular  2.  Activity: As tolerated  3.  Restrictions: None  4.  Follow up: Neurology (Dr Barragan), Neuro-ophthalmology, GI    A total of 30 minutes was spent on discharge activities.     Patient seen and discussed with Dr. Pop Domingo  Neurology Resident, PGY2  Pager: [phone]    Ole Carlos MD/PhD  [facility]  [phone]    Attending physician: I saw and evaluated the patient on the day of discharge and I agree with the findings and plan of care as per Dr. Carlos above.    Briefly, this is a 49 year old woman with recurrent right optic neuritis associated with MOG-IgG antibody associated demyelinating disease. She received pulse IV steroids, unfortunately complicated by a second  lifetime episode of pancreatitis, both of which seem to have been induced by pulse steroids. Her pancreatic symptoms gradually resolved during hospitalization.    The patient also underwent five plasma exchanges with mild improvement in her visual acuity in the right eye (20/100-->20/80) and some improvement in color desaturation. Discussed extending the course to seven exchanges with the patient but she would prefer to stop at this time, which I think is reasonable.    She will remain on 20 mg of prednisone and follow up with Dr. Barragan in about four weeks.    I spent a total of 45 minutes on discharge activities, a majority of this time spent in counseling with the patient and her  regarding the nature of her diagnosis and plans for future treatment. I answered their several questions.    Robbin Mazariegos MD   of Neurology

## 2019-05-30 NOTE — PROGRESS NOTES
"[facility], [place]   Neurology Daily Note  Kerrie Patel  [id number]  05/30/2019    Subjective:   PLEX 4 of 5 yesterday tolerated better than priors.    Reports that abdominal pain has improved. No nausea, no vomiting, no diarrhea. Tolerating diet, abdominal pain improved.  Medicine consulted and following  Right eye blurriness slightly improved, lacking full visual fields.        Objective   /52 (BP Location: Right arm)   Pulse 63   Temp 96  F (35.6  C) (Oral)   Resp 16   Ht 1.55 m (5' 1.02\")   Wt 80.8 kg (178 lb 1.6 oz)   SpO2 94%   BMI 33.63 kg/m     General: Sitting in bed, appears comfortable.   HEENT: Normocephalic atraumatic   Cardiac: RRR  Chest: No respiratory distress  Abdomen: Soft, non-tender to palpation  Extremities: No bilateral lower extremity edema   Skin:  No lesions.   Psych:  Mood and affect stable.     Neuro:  Mental status: Alert, attentive, oriented. Follows commands. Speech fluent and comprehension intact.   Cranial nerves:  Eyes conjugate, relative afferent pupillary defect on the right, patient still unable to distinguish red but able to distinguish blue and green, VFF, no facial asymmetry, facial sensation intact, tongue midline, shoulder shrug and SCM strong  Motor: Tone normal. No atrophy. Subtle left pronator drift otherwise strength testing 5/5 throughout.   Reflexes: 2+ reflexes in biceps, brachioradialis and knees. Toes downgoing bilaterally.  Sensory: Intact to light touch throughout   Coordination: FNF intact  Gait: Not tested     Investigations    CMP   Recent Labs   Lab 05/28/19  0553 05/27/19  0453 05/26/19  0940 05/25/19  0604 05/24/19  0552    138  --  140 140   POTASSIUM 4.0 4.4  --  4.2 4.3   CHLORIDE 105 105  --  106 106   CO2 29 28  --  26 27   ANIONGAP 8 5  --  7 7   GLC 87 148*  --  175* 167*   BUN 15 27  --  28 24   CR 0.67 0.68  --  0.69 0.62   GFRESTIMATED >90 >90  --  >90 >90   GFRESTBLACK >90 >90  --  >90 >90   LISA 8.4* " 8.1*  --  8.6 8.7   PROTTOTAL 5.6* 4.7* 5.4*  --   --    ALBUMIN 3.7 3.5 4.1  --   --    BILITOTAL 0.6 0.6 0.7  --   --    ALKPHOS 36* 14* 14*  --   --    AST 15 6 6  --   --    ALT 21 17 16  --   --       CBC   Recent Labs   Lab 05/29/19  1300 05/28/19  0553 05/27/19  0453 05/25/19  0604   WBC 12.0* 11.9* 13.8* 17.4*   RBC 4.81 4.76 4.27 4.85   HGB 14.7 14.6 13.1 14.8   HCT 45.2 45.2 41.9 45.3   MCV 94 95 98 93   MCH 30.6 30.7 30.7 30.5   MCHC 32.5 32.3 31.3* 32.7   RDW 13.5 13.6 13.6 13.4   * 113* 120* 155     Lipase: 677 --> 782 --> 1,030    Assessment and Plan   Ms Kerrie Patel is a 49 year old woman with a 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  Exam is notable for a right afferent pupillary defect, reduced right visual acuity, and a left pronator drift in the LUE.  She was admitted as a direct admit for five rounds of PLEX and high dose steroids. She has had prior complications of pancreatitis and hallucinations with Solu-Medrol 1000 mg daily and developed panreatitis again during this hospitalization. Therefore Solu-Medrol was discontinued on 5/26.      #MOG-opathy   #Recurrent right optic neuritis  #Mild left-sided weakness  - Discontinued solu-medrol on 5/26 due to pancreatitis, she has had a previous history of pancreatitis following IV pulsed steroids.   - Started on prednisone 20mg daily given that she has been on long-term oral steroids prior to admission to prevent adrenal insufficiency   - GI prophylaxis with pantoprazole while on steroids  - Continue PCP prophylaxis with Bactrim MWF  - Plasma exchange 5/5 5/31 with daily INR and fibrinogen, appreciate transfusion medicine assistance  - Ophthalmology following, appreciate assistance  - Tylenol for headaches  - Zofran and Compazine for nausea  - Continue vitamin D 5000 units daily  - Continue calcium  carbonate 500 mg daily  - S/p MRI C and T spine with and without contrast, stable and no new lesions     #Pancreatitis  Likely 2/2 the high dose steroids. Lipase significantly increased with abdominal pain. Abdominal pain, nausea and diarrhea improved today.   - U/S abdomen on 5/28 showed no evidence of biliary obstruction.   - ADAT, advanced to regular   - Monitor CBC, BMP and Lipase  - Medicine team recommending GI outpatient follow up at discharge  - Tramadol 50 mg every 6 hours PRN for pain     #Leukocytosis, improving   Suspected secondary to high dose steroids, down-trended after discontinuing IV steroids. Will monitor for signs and symptoms of infection.   -If fever, will obtain UA, blood cultures, and CT A/P w/IV contrast    #Insomnia    - Melatonin 3 mg nightly     FEN: Regular diet   PPx: SCDs, ambulation   Code: Full    Disposition: will discharge PM 5/31 likely after the PLEX, plan to discharge home ([facility])    Patient was seen and discussed with Dr. Lord.    Ole Carlos MD/PhD  [facility]  [phone]    Attending physician: I saw and evaluated the patient with the resident team and I agree with the findings and plan of care as per Dr. Carlos above.    The patient reports ongoing improvement in her abdominal pain today, tolerating a regular diet. She also feels that vision in the right eye is improving, for example able to make out lines and numbers on the wall clock today, which was not the case previously.    I briefly discussed with her that if desired we could extend the course of plasma exchange treatments up to a total of seven in the hopes of facilitating improvement, although there are no studies specifically demonstrating superior results with a higher number of exchanges. At this point, she would prefer to go home after fifth of a planned five exchanges tomorrow, which I think is reasonable given her progress to date.    Disposition: Home  tomorrow after plasma exchange and follow up appointment in ophthalmology (5-31, 11:30 am).    Robbin Mazariegos MD   of Neurology

## 2019-05-30 NOTE — PROGRESS NOTES
[facility], [facility] ([place], [place]) Progress Note - Hospitalist Consultation Service, [place]        Date of Admission:  5/22/2019  Assessment & Plan   Kerrie Patel is a 49 year old female admitted on 5/22/2019. 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  Currently on neurology service being treated with high dose steroids and PLEX.     #Acute mild pancreatitis   #Acute abdominal pain and nausea 2/2 above    Pt w/ prior episode of pancreatitis 1/2019 at [facility] felt to be 2/2 high dose steroids for treatment of optic neuritis. CT A/P at that time with acute pancreatitis. Currently admitted for recurrent R optic neuritis and being treated with  PLEX and steroids.       It is uncommon for steroids to cause pancreatitis, although this appears to have happened in a stereotyped fashion twice for this patient.  Ultrasound reveals 3.5mm common bile duct.    - regular diet  -consider GI referral as outpatient, could consider advanced imaging.  -Regarding long term steroid use, if the patient can tolerate weaning would begin this process.  20 mg prednisone daily is not physiologically necessary.   -Calcium and vitaminD  -stop checking lipase      #Recurrent right optic neuritis   #Mild left sided weakness   #Anti-MOGopathy  Currently being treated with  steroids and PLEX.  Transfusion medicine and neurology following.    -Care per their team.     -hypofibrinogenemia complicating management.      #Hypertension - likely from steroids, improved, follow.      #Leuckocytosis:  Stable, monitor.       #Hypofibrinogenemia   Likely due to PLEX  - management per Transfusion medicine     #Steroid induced  hyperglycemia - improving.     The patient's care was discussed with the Patient and Primary team.    Broderick Song MD  Hospitalist Service, [address], [place]  Pager: [phone]  Please see sticky note for cross cover information  ______________________________________________________________________    Interval History     Continues to feel well with respect to abdominal pain.  Tolerating regular diet well.    4 point review of systems otherwise negative.      Data reviewed today: I reviewed all medications, new labs and imaging results over the last 24 hours. I personally reviewed no images or EKG's today.    Physical Exam   Vital Signs: Temp: 96  F (35.6  C) Temp src: Oral BP: 113/52 Pulse: 63 Heart Rate: 74 Resp: 16 SpO2: 94 % O2 Device: None (Room air)    Weight: 178 lbs 1.6 oz    General: Sitting up in bed, conversant, comfortable.   Resp: CTAB  CV: Normal rate, regular rhythm. No murmurs, rubs, or gallops   Abd: Soft, non-tender, non distended.  No hepatosplenomegaly or mass.   Ext: trace lower extremity edema.   Neuro: alert and oriented    Data   Recent Labs   Lab 05/29/19  1300 05/29/19  0632 05/28/19  0625 05/28/19  0553  05/27/19  0453  05/25/19  0604   WBC 12.0*  --   --  11.9*  --  13.8*  --  17.4*   HGB 14.7  --   --  14.6  --  13.1  --  14.8   MCV 94  --   --  95  --  98  --  93   *  --   --  113*  --  120*  --  155   INR 1.09 1.43*  --  1.11   < >  --   --   --    NA  --   --   --  141  --  138  --  140   POTASSIUM  --   --   --  4.0  --  4.4  --  4.2   CHLORIDE  --   --   --  105  --  105  --  106   CO2  --   --   --  29 --  28  --  26   BUN  --   --   --  15  --  27  --  28   CR  --   --   --  0.67  --  0.68  --  0.69   ANIONGAP  --   --   --  8  --  5  --  7   LISA  --   --   --  8.4*  --  8.1*  --  8.6   GLC  --   --   --  87  --  148*  --  175*   ALBUMIN  --   --   --  3.7  --  3.5   < >  --    PROTTOTAL  --   --   --  5.6*  --  4.7*   < >  --     BILITOTAL  --   --   --  0.6  --  0.6   < >  --    ALKPHOS  --   --   --  36*  --  14*   < >  --    ALT  --   --   --  21  --  17   < >  --    AST  --   --   --  15  --  6   < >  --    LIPASE  --   --  1,030*  --   --  782*   < >  --     < > = values in this interval not displayed.     No results found for this or any previous visit (from the past 24 hour(s)).  Medications       calcium carbonate 500 mg (elemental)  500 mg Oral Daily     cholecalciferol  5,000 Units Oral Daily     heparin  3 mL Intracatheter Q24H     heparin  5 mL Intracatheter Q24H     melatonin  3 mg Oral At Bedtime     pantoprazole  40 mg Oral Daily     predniSONE  20 mg Oral Daily     sulfamethoxazole-trimethoprim  1 tablet Oral Once per day on Mon Wed Fri

## 2019-05-30 NOTE — PLAN OF CARE
[id number]: VSS. Afebrile. Denied pain, nausea and vomiting. No changes noted with neuro assessment. Voiding spontaneously but not saving urine. LBM Monday, 5/27. Patient feels constipated; however passing gas. PRN PO Senna 1 tablet given. Appetite good. Up ad rudy walking the halls. Ophthalmology appointment tomorrow at 11:30 am. PLEX tomorrow at 12:30 pm. Plan for patient to discharge after. Continue with POC.

## 2019-05-30 NOTE — PLAN OF CARE
VS stable, afebrile. Pt denied pain/SOB/n/v. Pt rested comfortably between nursing cares. Neuro assessment has no changes. Ophthalmology appt and apheresis scheduled for Friday. No significant events this shift, continue plan of care.

## 2019-05-31 ENCOUNTER — OFFICE VISIT (OUTPATIENT)
Dept: OPHTHALMOLOGY | Facility: CLINIC | Age: 49
End: 2019-05-31
Attending: OPHTHALMOLOGY
Payer: COMMERCIAL

## 2019-05-31 VITALS
OXYGEN SATURATION: 92 % | BODY MASS INDEX: 32.51 KG/M2 | RESPIRATION RATE: 16 BRPM | HEIGHT: 61 IN | DIASTOLIC BLOOD PRESSURE: 89 MMHG | SYSTOLIC BLOOD PRESSURE: 122 MMHG | HEART RATE: 69 BPM | TEMPERATURE: 95 F | WEIGHT: 172.2 LBS

## 2019-05-31 DIAGNOSIS — H46.9 OPTIC NEURITIS: Primary | ICD-10-CM

## 2019-05-31 LAB
BASOPHILS # BLD AUTO: 0 10E9/L (ref 0–0.2)
BASOPHILS NFR BLD AUTO: 0.1 %
DIFFERENTIAL METHOD BLD: ABNORMAL
EOSINOPHIL # BLD AUTO: 0.1 10E9/L (ref 0–0.7)
EOSINOPHIL NFR BLD AUTO: 0.5 %
ERYTHROCYTE [DISTWIDTH] IN BLOOD BY AUTOMATED COUNT: 13.6 % (ref 10–15)
FIBRINOGEN PPP-MCNC: 164 MG/DL (ref 200–420)
HCT VFR BLD AUTO: 46.5 % (ref 35–47)
HGB BLD-MCNC: 15.3 G/DL (ref 11.7–15.7)
IMM GRANULOCYTES # BLD: 0.1 10E9/L (ref 0–0.4)
IMM GRANULOCYTES NFR BLD: 0.6 %
INR PPP: 1.08 (ref 0.86–1.14)
LYMPHOCYTES # BLD AUTO: 1.5 10E9/L (ref 0.8–5.3)
LYMPHOCYTES NFR BLD AUTO: 10.3 %
MCH RBC QN AUTO: 30.6 PG (ref 26.5–33)
MCHC RBC AUTO-ENTMCNC: 32.9 G/DL (ref 31.5–36.5)
MCV RBC AUTO: 93 FL (ref 78–100)
MONOCYTES # BLD AUTO: 0.9 10E9/L (ref 0–1.3)
MONOCYTES NFR BLD AUTO: 6.3 %
NEUTROPHILS # BLD AUTO: 11.8 10E9/L (ref 1.6–8.3)
NEUTROPHILS NFR BLD AUTO: 82.2 %
NRBC # BLD AUTO: 0 10*3/UL
NRBC BLD AUTO-RTO: 0 /100
PLATELET # BLD AUTO: 146 10E9/L (ref 150–450)
RBC # BLD AUTO: 5 10E12/L (ref 3.8–5.2)
WBC # BLD AUTO: 14.4 10E9/L (ref 4–11)

## 2019-05-31 PROCEDURE — G0463 HOSPITAL OUTPT CLINIC VISIT: HCPCS | Mod: ZF | Performed by: TECHNICIAN/TECHNOLOGIST

## 2019-05-31 PROCEDURE — 25000132 ZZH RX MED GY IP 250 OP 250 PS 637: Performed by: PSYCHIATRY & NEUROLOGY

## 2019-05-31 PROCEDURE — 36514 APHERESIS PLASMA: CPT

## 2019-05-31 PROCEDURE — 92083 EXTENDED VISUAL FIELD XM: CPT | Mod: ZF | Performed by: OPHTHALMOLOGY

## 2019-05-31 PROCEDURE — 25000132 ZZH RX MED GY IP 250 OP 250 PS 637: Performed by: STUDENT IN AN ORGANIZED HEALTH CARE EDUCATION/TRAINING PROGRAM

## 2019-05-31 PROCEDURE — 25000131 ZZH RX MED GY IP 250 OP 636 PS 637: Performed by: STUDENT IN AN ORGANIZED HEALTH CARE EDUCATION/TRAINING PROGRAM

## 2019-05-31 PROCEDURE — 85384 FIBRINOGEN ACTIVITY: CPT | Performed by: PATHOLOGY

## 2019-05-31 PROCEDURE — 85610 PROTHROMBIN TIME: CPT | Performed by: PATHOLOGY

## 2019-05-31 PROCEDURE — P9041 ALBUMIN (HUMAN),5%, 50ML: HCPCS | Performed by: PATHOLOGY

## 2019-05-31 PROCEDURE — 25000128 H RX IP 250 OP 636: Performed by: PATHOLOGY

## 2019-05-31 PROCEDURE — 85025 COMPLETE CBC W/AUTO DIFF WBC: CPT | Performed by: PATHOLOGY

## 2019-05-31 PROCEDURE — 25000125 ZZHC RX 250: Performed by: PATHOLOGY

## 2019-05-31 RX ORDER — PREDNISONE 20 MG/1
20 TABLET ORAL DAILY
Qty: 30 TABLET | Refills: 1 | Status: SHIPPED | OUTPATIENT
Start: 2019-05-31 | End: 2019-07-18

## 2019-05-31 RX ORDER — SULFAMETHOXAZOLE/TRIMETHOPRIM 800-160 MG
1 TABLET ORAL
Qty: 30 TABLET | Refills: 1 | Status: SHIPPED | OUTPATIENT
Start: 2019-06-03 | End: 2019-07-05

## 2019-05-31 RX ORDER — CALCIUM GLUCONATE 100 MG/ML
AMPUL (ML) INTRAVENOUS
Status: COMPLETED | OUTPATIENT
Start: 2019-05-31 | End: 2019-05-31

## 2019-05-31 RX ORDER — PANTOPRAZOLE SODIUM 40 MG/1
40 TABLET, DELAYED RELEASE ORAL DAILY
Qty: 60 TABLET | Refills: 1 | Status: SHIPPED | OUTPATIENT
Start: 2019-06-01

## 2019-05-31 RX ORDER — HEPARIN SODIUM (PORCINE) LOCK FLUSH IV SOLN 100 UNIT/ML 100 UNIT/ML
3 SOLUTION INTRAVENOUS ONCE
Status: DISCONTINUED | OUTPATIENT
Start: 2019-05-31 | End: 2019-05-31 | Stop reason: CLARIF

## 2019-05-31 RX ORDER — ALBUMIN HUMAN 25 %
2250 INTRAVENOUS SOLUTION INTRAVENOUS
Status: COMPLETED | OUTPATIENT
Start: 2019-05-31 | End: 2019-05-31

## 2019-05-31 RX ADMIN — CALCIUM 500 MG: 500 TABLET ORAL at 08:43

## 2019-05-31 RX ADMIN — PREDNISONE 20 MG: 20 TABLET ORAL at 08:43

## 2019-05-31 RX ADMIN — ANTICOAGULANT CITRATE DEXTROSE SOLUTION FORMULA A 697 ML: 12.25; 11; 3.65 SOLUTION INTRAVENOUS at 09:15

## 2019-05-31 RX ADMIN — CHOLECALCIFEROL CAP 125 MCG (5000 UNIT) 5000 UNITS: 125 CAP at 08:43

## 2019-05-31 RX ADMIN — CALCIUM GLUCONATE 3 G: 98 INJECTION, SOLUTION INTRAVENOUS at 09:15

## 2019-05-31 RX ADMIN — SULFAMETHOXAZOLE AND TRIMETHOPRIM 1 TABLET: 800; 160 TABLET ORAL at 08:43

## 2019-05-31 RX ADMIN — PANTOPRAZOLE SODIUM 40 MG: 40 TABLET, DELAYED RELEASE ORAL at 08:43

## 2019-05-31 RX ADMIN — ALBUMIN HUMAN 2250 ML: 0.05 INJECTION, SOLUTION INTRAVENOUS at 09:15

## 2019-05-31 ASSESSMENT — REFRACTION_WEARINGRX
OS_CYLINDER: +1.50
OS_SPHERE: -8.00
OS_AXIS: 077
SPECS_TYPE: PAL
OD_CYLINDER: +1.00
OD_SPHERE: -7.75
OD_AXIS: 100

## 2019-05-31 ASSESSMENT — EXTERNAL EXAM - RIGHT EYE: OD_EXAM: NORMAL

## 2019-05-31 ASSESSMENT — CONF VISUAL FIELD
OD_SUPERIOR_NASAL_RESTRICTION: 2
OS_NORMAL: 1
OD_SUPERIOR_TEMPORAL_RESTRICTION: 1
METHOD: COUNTING FINGERS

## 2019-05-31 ASSESSMENT — TONOMETRY
OS_IOP_MMHG: 19
OD_IOP_MMHG: 19
IOP_METHOD: ICARE

## 2019-05-31 ASSESSMENT — ACTIVITIES OF DAILY LIVING (ADL)
ADLS_ACUITY_SCORE: 9

## 2019-05-31 ASSESSMENT — EXTERNAL EXAM - LEFT EYE: OS_EXAM: NORMAL

## 2019-05-31 ASSESSMENT — SLIT LAMP EXAM - LIDS
COMMENTS: NORMAL
COMMENTS: NORMAL

## 2019-05-31 ASSESSMENT — VISUAL ACUITY
OD_CC: 20/80
OD_CC+: +1-1
METHOD: SNELLEN - LINEAR
CORRECTION_TYPE: GLASSES
OS_CC: 20/20

## 2019-05-31 ASSESSMENT — MIFFLIN-ST. JEOR: SCORE: 1348.85

## 2019-05-31 ASSESSMENT — CUP TO DISC RATIO
OD_RATIO: 0.4
OS_RATIO: 0.4

## 2019-05-31 NOTE — PLAN OF CARE
Pt afebrile, VSS. Neuro status unchanged. Denied pain. No nausea. LS clear on RA. Pt slept well throughout the night.  at the bedside. Up independently. Voiding spontaneously. No BM this shift, Pt reports constipation, given bowel meds during evening shift. Ophthalmology appointment at 1130 today, PLEX at 1230, plan for discharge to home after.

## 2019-05-31 NOTE — PROCEDURES
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 Received TPE x 5 at an [facility] in [place] on February. Was on prednisone taper when she noticed right eye pain about 1 week ago. Couple days later, she noticed decreased vision of the right eye. Her prednisone was increased back up to 20mg but symptoms were not improving. Recent MRI shows subtle enhancement of the right optic nerve compatible with a history of optic neuritis. She is also status post rituximab infusions (March, April). A recent MOG titer 1:20.     Today she had #5 of the 5 scheduled TPE procedures.  She tolerated the procedure well without complication.  She feels things are going well and no issues.  The team will work to get the line removed and work toward discharge.     - CVC vascular access  - ACD-A for anticoagulation. Will give calcium gluconate in the return to offset effects and monitor iCa.  - Do not start ACE inhibitors throughout the duration of the TPE series as these have been associated with reactions during apheresis.    - Notify the Transfusion Medicine physician of any upcoming procedures, surgeries, or biopsies as TPE with albumin replacement will affect coagulation factor levels.  - Consider the impact of TPE on laboratory studies and medication levels.  IVIG and other immune therapies such as rituximab should NOT be given immediately prior to TPE but rather after due to removal.            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 History of optic neuropathy/neuritis and idiopathic orbital inflammatory syndrome. Received TPE x 5 at an [facility] in [place] on February. Was on prednisone taper when she noticed right eye pain about 1 week ago. Couple days later, she noticed decreased vision of the right eye. Her prednisone was increased back up to 20mg but symptoms were not improving. Recent MRI shows subtle enhancement of the right optic nerve compatible with a history of optic neuritis. She is also status post rituximab infusions (March, April).                 Past Medical History:     Past Medical History:   Diagnosis Date     Diffuse cystic mastopathy of breast     No Comments Provided     Family history of diabetes mellitus     No Comments Provided     Gastro-esophageal reflux disease without esophagitis     No Comments Provided     Pain in thoracic spine     No Comments Provided             Past Surgical History:     Past Surgical History:   Procedure Laterality Date      SECTION      96, 99     CHOLECYSTECTOMY           IR CVC NON TUNNEL PLACEMENT  2019     OTHER SURGICAL HISTORY      2012,WQV261,PREMALIG/BENIGN SKIN LESION EXCISION,BREAST LESION     OTHER SURGICAL HISTORY      2017,41518.0,DC TLH W TUBES/OVAR 250 G OR LESS              Social History:     Social History     Tobacco Use     Smoking status: Never Smoker     Smokeless tobacco: Never Used   Substance Use Topics     Alcohol use: No     Alcohol/week: 0.0 oz     Comment: rare            Allergies:     Allergies   Allergen Reactions     Amoxicillin Nausea and Vomiting     Lactose      Other reaction(s): GI Bleeding     Methylprednisolone GI Disturbance     Pancreatitis when using high dose steroids, tolerates low dose steroids             Medications:     Current Facility-Administered Medications   Medication     acetaminophen (TYLENOL) tablet 975 mg     bisacodyl (DULCOLAX) Suppository 10 mg     calcium carbonate 500 mg (elemental)  "(OSCAL;OYSTER SHELL CALCIUM) tablet 500 mg     cholecalciferol (VITAMIN D3) 5000 units (125 mcg) capsule 5,000 Units     glucose gel 15-30 g    Or     dextrose 50 % injection 25-50 mL    Or     glucagon injection 1 mg     diphenhydrAMINE (BENADRYL) capsule 25 mg     heparin 100 UNIT/ML injection 3 mL     heparin 100 UNIT/ML injection 3 mL     heparin 100 UNIT/ML injection 3 mL     heparin 100 UNIT/ML injection 3 mL     heparin 100 UNIT/ML injection 5 mL     heparin 100 UNIT/ML injection 5 mL     lidocaine (LMX4) cream     lidocaine 1 % 0.1-1 mL     magnesium sulfate 4 g in 100 mL sterile water (premade)     melatonin tablet 1 mg     melatonin tablet 3 mg     naloxone (NARCAN) injection 0.1-0.4 mg     ondansetron (ZOFRAN) injection 4 mg    Or     ondansetron (ZOFRAN) tablet 4 mg     pantoprazole (PROTONIX) EC tablet 40 mg     polyethylene glycol (MIRALAX/GLYCOLAX) Packet 17 g     potassium chloride (KLOR-CON) Packet 20-40 mEq     potassium chloride 10 mEq in 100 mL intermittent infusion with 10 mg lidocaine     potassium chloride 10 mEq in 100 mL sterile water intermittent infusion (premix)     potassium chloride 20 mEq in 50 mL intermittent infusion     potassium chloride ER (K-DUR/KLOR-CON M) CR tablet 20-40 mEq     predniSONE (DELTASONE) tablet 20 mg     prochlorperazine (COMPAZINE) tablet 5 mg     senna-docusate (SENOKOT-S/PERICOLACE) 8.6-50 MG per tablet 1 tablet    Or     senna-docusate (SENOKOT-S/PERICOLACE) 8.6-50 MG per tablet 2 tablet     simethicone (MYLICON) suspension 40 mg     sodium chloride (PF) 0.9% PF flush 10 mL     sodium chloride (PF) 0.9% PF flush 10-20 mL     sulfamethoxazole-trimethoprim (BACTRIM DS/SEPTRA DS) 800-160 MG per tablet 1 tablet     traMADol (ULTRAM) tablet 50 mg             Review of Systems:   See above           Exam:   /89   Pulse 69   Temp 95  F (35  C) (Oral)   Resp 16   Ht 1.55 m (5' 1.02\")   Wt 78.1 kg (172 lb 3.2 oz)   SpO2 92%   BMI 32.51 kg/m    Alert, no " apparent distress  Breathing appears comfortable on room air.  Central line accessed for the procedure.            Data:     ROUTINE LABS  CMP  Recent Labs   Lab 05/28/19  0553 05/27/19  0453 05/26/19  0940 05/25/19  0604    138  --  140   POTASSIUM 4.0 4.4  --  4.2   CHLORIDE 105 105  --  106   CO2 29 28  --  26   ANIONGAP 8 5  --  7   GLC 87 148*  --  175*   BUN 15 27  --  28   CR 0.67 0.68  --  0.69   GFRESTIMATED >90 >90  --  >90   GFRESTBLACK >90 >90  --  >90   LISA 8.4* 8.1*  --  8.6   PROTTOTAL 5.6* 4.7* 5.4*  --    ALBUMIN 3.7 3.5 4.1  --    BILITOTAL 0.6 0.6 0.7  --    ALKPHOS 36* 14* 14*  --    AST 15 6 6  --    ALT 21 17 16  --      CBC  Recent Labs   Lab 05/31/19  0915 05/29/19  1300 05/28/19  0553 05/27/19  0453   WBC 14.4* 12.0* 11.9* 13.8*   RBC 5.00 4.81 4.76 4.27   HGB 15.3 14.7 14.6 13.1   HCT 46.5 45.2 45.2 41.9   MCV 93 94 95 98   MCH 30.6 30.6 30.7 30.7   MCHC 32.9 32.5 32.3 31.3*   RDW 13.6 13.5 13.6 13.6   * 124* 113* 120*     INR  Recent Labs   Lab 05/31/19  0915 05/29/19  1300 05/29/19  0632 05/28/19  0553   INR 1.08 1.09 1.43* 1.11             Procedure Summary:   A single volume therapeutic plasma exchange was performed and 5% albumin was used as the replacement fluid.  A dual lumen central line was used for access and allowed for appropriate flow during the procedure.  ACD-A was used for anticoagulation. To offset the effects of the citrate, calcium gluconate was given in the return line.  The patient's vital signs were stable throughout.  The patient tolerated the procedure well without complication.  See apheresis flowsheet for additional details.        Attestation: During the procedure the patient was directly seen and evaluated by me, Broderick Sandoval MD.    Broderick Sandoval MD  Transfusion Medicine Attending  Laboratory Medicine & Pathology  Pager: [phone]

## 2019-05-31 NOTE — PLAN OF CARE
VSS on room air. No changes in vision or neuro status. Denies pain and nausea. Voiding but not saving. Has not had a bowel movement for a few days but is passing gas. Declined Miralax. Received apheresis treatment. CVC removed by provider. Reviewed AVS with patient and spouse. Discharged home.

## 2019-05-31 NOTE — PROGRESS NOTES
Assessment & Plan     Kerrie Patel is a 49 year old female with the following diagnoses:   1. 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 Was on prednisone taper (on last day of 5mg) when she noticed some headache and right eye pain (mid May). Her prednisone was increased back up to 20mg but symptoms were not improving. MRI orbits showed some right mild enhancement of the optic nerve. She was then given 3 days of high dose oral steroids and placed on 60mg oral steroids. She was admitted to the hospital 5/22/19 and started plasmapharesis (status post 5 / 5 doses finished 5/31/19). She was given 500mg IV solumedrol twice a day. Her lipase was elevated 5/26/19. Her IV methylpred was discontinued and she was given 20mg or oral prednisone instead (started 5/27/19).     Throughout this entire course, she denies significant vision changes in the right eye.     She is status post rituximab infusions (March, April). Next one was scheduled in September.     Lab results  Normal: treponema, ANCA, ACE, IgG subclasses, vasculitis panel, NMO, KIM, ESR, B12, CRP, Quant Gold  Abnormal: MOG 1:20 titer   CD 19 cell count: very low      Imaging: MRI thoracic and cervical 2/2019 unremarkable. Per patient, Dr. Barragan saw 2 lesions in her spine that the radiologist did not see.     On examination, her visual acuity is 20/80 right eye and 20/20 left eye. Color plates 0/11 right eye and 11/11 left eye. Anterior segment examination shows normal eyelid, no conjunctival injection/chemosis. There was no anterior segment or anterior vitreous cells. Fundus examination showed pallor right optic nerve. Left optic nerve appears healthy. LVC shows generalized depression right eye (stable).     It is my impression that Kerrie has recurrence of right optic neuritis (MOG+). She is status post 5/5 PLEX treatments that was completed 5/31/19.  Her examination today showed mild  improvement in VA (from 20/100 to 20/80) although she denies any subjective improvement. Her visual field is also the same. Continue on oral pred 20mg per day. Her CD 19 level was recently checked and was low.    She has not gotten her new glasses yet and would prefer to wait on refraction until next visit.     return to clinic 2-3 weeks with Dr. Kaur. Return precautions discussed. Kerrie has Riddhi's direct line #.        Complete documentation of historical and exam elements from today's encounter can be found in the full encounter summary report (not reduplicated in this progress note).  I personally obtained the chief complaint(s) and history of present illness.  I confirmed and edited as necessary the review of systems, past medical/surgical history, family history, social history, and examination findings as documented by others; and I examined the patient myself.  I personally reviewed the relevant tests, images, and reports as documented above.  I formulated and edited as necessary the assessment and plan and discussed the findings and management plan with the patient and family. - Xavier Mckinley MD

## 2019-05-31 NOTE — NURSING NOTE
Chief Complaint(s) and History of Present Illness(es)     Follow Up     Since onset it is stable.              Comments     Patient requesting no visual field today because removal of half an hour ago and does not want it to bleed.   +neck pain    GABRIELLE Ashley 5/31/2019 11:50 AM

## 2019-05-31 NOTE — PLAN OF CARE
Patient cares from 19:30 to 23:30. Patient denies pain, no nausea, PIV in place. Pt is voiding and ambulating without difficulty, reports positive flatus, no BM. Pt has Ophthalmology appointment tomorrow at 11:30 am. PLEX tomorrow at 12:30 pm. Plan is to discharge home tomorrow after procedure.

## 2019-06-03 ENCOUNTER — PATIENT OUTREACH (OUTPATIENT)
Dept: CARE COORDINATION | Facility: CLINIC | Age: 49
End: 2019-06-03

## 2019-06-19 ENCOUNTER — OFFICE VISIT (OUTPATIENT)
Dept: OPHTHALMOLOGY | Facility: CLINIC | Age: 49
End: 2019-06-19
Payer: COMMERCIAL

## 2019-06-19 ENCOUNTER — OFFICE VISIT (OUTPATIENT)
Dept: NEUROLOGY | Facility: CLINIC | Age: 49
End: 2019-06-19
Attending: PSYCHIATRY & NEUROLOGY
Payer: COMMERCIAL

## 2019-06-19 VITALS
HEART RATE: 108 BPM | SYSTOLIC BLOOD PRESSURE: 160 MMHG | DIASTOLIC BLOOD PRESSURE: 87 MMHG | BODY MASS INDEX: 32.54 KG/M2 | HEIGHT: 61 IN

## 2019-06-19 DIAGNOSIS — G44.229 CHRONIC TENSION-TYPE HEADACHE, NOT INTRACTABLE: ICD-10-CM

## 2019-06-19 DIAGNOSIS — G44.219 EPISODIC TENSION-TYPE HEADACHE, NOT INTRACTABLE: ICD-10-CM

## 2019-06-19 DIAGNOSIS — H46.9 OPTIC NEURITIS: Primary | ICD-10-CM

## 2019-06-19 DIAGNOSIS — H46.9 OPTIC NEURITIS, RIGHT: Primary | ICD-10-CM

## 2019-06-19 DIAGNOSIS — H53.10 SUBJECTIVE VISUAL DISTURBANCE: Primary | ICD-10-CM

## 2019-06-19 DIAGNOSIS — H53.40 VISUAL FIELD DEFECT: ICD-10-CM

## 2019-06-19 RX ORDER — TOPIRAMATE 25 MG/1
25 TABLET, FILM COATED ORAL 2 TIMES DAILY
Qty: 60 TABLET | Refills: 3 | Status: SHIPPED | OUTPATIENT
Start: 2019-06-19 | End: 2020-02-10

## 2019-06-19 ASSESSMENT — TONOMETRY
OS_IOP_MMHG: 18
OD_IOP_MMHG: 18
IOP_METHOD: ICARE

## 2019-06-19 ASSESSMENT — REFRACTION_WEARINGRX
OD_AXIS: 100
OS_CYLINDER: +1.50
SPECS_TYPE: PAL
OD_SPHERE: -7.75
OD_CYLINDER: +1.00
OS_SPHERE: -8.00
OS_AXIS: 077

## 2019-06-19 ASSESSMENT — CONF VISUAL FIELD
OD_INFERIOR_TEMPORAL_RESTRICTION: 2
OD_SUPERIOR_TEMPORAL_RESTRICTION: 2
OD_SUPERIOR_NASAL_RESTRICTION: 2
OD_INFERIOR_NASAL_RESTRICTION: 2
OS_NORMAL: 1

## 2019-06-19 ASSESSMENT — VISUAL ACUITY
OD_CC+: +2
OD_CC: 20/60
OS_CC: 20/20
METHOD: SNELLEN - LINEAR

## 2019-06-19 ASSESSMENT — CUP TO DISC RATIO
OD_RATIO: 0.4
OS_RATIO: 0.4

## 2019-06-19 ASSESSMENT — SLIT LAMP EXAM - LIDS
COMMENTS: NORMAL
COMMENTS: NORMAL

## 2019-06-19 ASSESSMENT — EXTERNAL EXAM - RIGHT EYE: OD_EXAM: NORMAL

## 2019-06-19 ASSESSMENT — EXTERNAL EXAM - LEFT EYE: OS_EXAM: NORMAL

## 2019-06-19 ASSESSMENT — PAIN SCALES - GENERAL: PAINLEVEL: NO PAIN (0)

## 2019-06-19 NOTE — PROGRESS NOTES
Assessment & Plan     Kerrie Patel is a 49 year old female with the following diagnoses:   1. Optic neuritis - Right Eye    2. 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 Was on prednisone taper (on last day of 5mg) when she noticed some headache and right eye pain (mid May). Her prednisone was increased back up to 20mg but symptoms were not improving. MRI orbits showed some right mild enhancement of the optic nerve. She was then given 3 days of high dose oral steroids and placed on 60mg oral steroids. She was admitted to the hospital 5/22/19 and started plasmapharesis (status post 5 / 5 doses finished 5/31/19). She was given 500mg IV solumedrol twice a day. Her lipase was elevated 5/26/19. Her IV methylpred was discontinued and she was given 20mg or oral prednisone instead (started 5/27/19). She denied significant vision changes throughout this entire course.     She is status post rituximab infusions (March, April). Next one was scheduled in September.     Lab results  Normal: treponema, ANCA, ACE, IgG subclasses, vasculitis panel, NMO, KIM, ESR, B12, CRP, Quant Gold  Abnormal: MOG 1:20 titer   CD 19 cell count: very low      Imaging: MRI thoracic and cervical 2/2019 unremarkable. Per patient, Dr. Barragan saw 2 lesions in her spine that the radiologist did not see. We reviewed this with neuro-radiologist 6/18/19 and there were no lesions.     On examination, her visual acuity is 20/60 right eye and 20/20 left eye. Color plates 0/11 right eye and 11/11 left eye. Anterior segment examination shows normal eyelid, no conjunctival injection/chemosis. There was no anterior segment or anterior vitreous cells. Fundus examination showed pallor right optic nerve. Left optic nerve appears healthy. LVC shows generalized depression right eye (improved).     It is my impression that Kerrie has recurrence of right optic neuritis (MOG+). She is status  post 5/5 PLEX treatments that was completed 5/31/19.  Her examination today showed mild improvement in VA (from 20/100 initally to 20/60 today) although she denies any subjective improvement. Her visual field may also be slightly better (although unreliable fields) Continue on oral pred 20mg per day per Dr Barragan. Her CD 19 level was recently checked and was low.  Follow-up in 1 year or sooner as needed for worsening symptoms.      She is seeing Dr. Barragan today.              Attending Physician Attestation:  Complete documentation of historical and exam elements from today's encounter can be found in the full encounter summary report (not reduplicated in this progress note).  I personally obtained the chief complaint(s) and history of present illness.  I confirmed and edited as necessary the review of systems, past medical/surgical history, family history, social history, and examination findings as documented by others; and I examined the patient myself.  I personally reviewed the relevant tests, images, and reports as documented above.  I formulated and edited as necessary the assessment and plan and discussed the findings and management plan with the patient and family. - Javi Mckinley MD  Neuro-ophthalmology Fellow

## 2019-06-19 NOTE — NURSING NOTE
"Chief Complaints and History of Present Illnesses   Patient presents with     Follow Up     2 week f/u Optic Neuritis     Chief Complaint(s) and History of Present Illness(es)     Follow Up     Laterality: right eye    Quality: blurred vision    Associated symptoms: headache (\"slight HA\" currently, taking CBD for relief. ).  Negative for eye pain    Comments: 2 week f/u Optic Neuritis              Comments     Patient notes that she started having HA on the left side x 1 weeks , feels that it could possibly be related to the weather we are having. Patient notes that VA hasn't changed since last visit, denies seeing \"red\".     Aaliyah Woodward June 19, 2019 9:35 AM                  "

## 2019-06-19 NOTE — LETTER
6/19/2019       RE: Kerrie Patel  [address]  [place] [address]     Dear Colleague,    Thank you for referring your patient, Kerrie Patel, to the [facility] at [facility]. Please see a copy of my visit note below.    THE [facility]  FOLLOW UP VISIT           PRINCIPAL NEUROLOGIC DIAGNOSIS: Optic Neuritis        HISTORY OF ILLNESS:    This is a follow visit for this 49 year old right handed genetic female  With a history of ON. Who was last seen on  5-22-19.   At that time the patient was recommended to see Dr Kaur who recommended steroids and PLEX, she developed mild pancretitis so they stop steroids and she got 3 days. Since last visit she feels weel and denies any concerns except for L sided headache and tremor bilaterally. Vision has improved. She was seen by Dr Kaur today and vision in the L side went from 20/100 to 20/60, still on prednisone 20 mg every day.    Current Symptoms:  1. HA  2. Tremors  3.              Current Outpatient Prescriptions:  Current Outpatient Medications   Medication     calcium carbonate (OS-LISA) 1500 (600 Ca) MG tablet     Cholecalciferol 5000 units TABS     melatonin 1 MG TABS tablet     pantoprazole (PROTONIX) 40 MG EC tablet     predniSONE (DELTASONE) 20 MG tablet     sulfamethoxazole-trimethoprim (BACTRIM DS/SEPTRA DS) 800-160 MG tablet     No current facility-administered medications for this visit.           ALLERGIES       Allergies   Allergen Reactions     Amoxicillin Nausea and Vomiting     Lactose      Other reaction(s): GI Bleeding     Methylprednisolone GI Disturbance     Pancreatitis when using high dose steroids, tolerates low dose steroids           REVIEW OF SYSTEMS:    Comprehensive review of systems otherwise was negative, including constitutional, head and neck, cardiovascular, pulmonary, gastrointestinal, endocrine, urologic, reproductive, rheumatic,  hematologic, immunologic, dermatologic, and psychiatric.    Nutritional concerns: None  Driving issues: None   Safety concerns regarding living situations and safety at home: None  Risk of falls: None  Pain: None    PHYSICAL EXAM:    Hair, skin, nails, and joints were normal. Neck was supple without Lhermitte's phenomenon.  There was no percussion tenderness over the spine.     The patient was alert and oriented to person, place, and time with normal language, attention and concentration, recent and remote memory, praxis, and intellectual function. Affect was normal. The patient did not appear depressed.    Visual acuity:  OD 20/20   OS 20/60    Correction: with    Visual fields were full to confrontation.   Pupils were 4-5 mm and briskly reactive OS with a relative afferent pupillary defect OD.  Funduscopic examination was normal without disc edema, erythema, or atrophy.  Extraocular movements: Intact without BINU  Facial sensation is normal. Normal strength of the muscles of mastication:   Muscles of facial expression were normal  Hearing was normal. Gag reflex and palatal movements were normal. Sternocleidomastoid and trapezius power were normal. Tongue movements were normal. There was no dysarthria.    Motor Examination:   There was no pronator drift.       Motor    Upper      Right Left   Shoulder Abduction 5 5   Elbow Flexion 5 5   Elbow Extension 5 5   Wrist Extension 5 5   Digit Extension 5 5   Digit Flexion 5 5   APB 5 5   Tone 0 0   Lower       Right Left   Hip Flexion 5 5   Knee Extension 5 5   Knee Flexion 5 5   Foot Dorsiflexion 5 5   Foot Plantar Flexion 5 5   EH 5 4+   Toe Flexion 5 4+   Tone 0 0               Reflexes:     Reflexes       Right  Left   Biceps 1  1   Triceps 1  1   Brachioradialis 1  1   Patellar  1  1   Achilles 1  1   Babinski down  down         Coordination:     Right Left   RRM Normal Normal   ROSA Normal Normal   FTN Normal Normal   RRM Normal Normal   HKS Normal Normal          Sensory examination:    Light touch:  Intact in all extremities      Coordination and Gait        Gait Normal   Right Left   Romberg Normal  Heel Normal Normal   Tandem {Normal  Toe Normal Normal           REVIEW OF IMAGING STUDIES:    I personally reviewed the following images:  MRI of the brain and orbits shows mild enhancement L eye but the R eye was symptomatic, no new lesions or enhancements.      ASSESSMENT:        PLAN:  Continue prednisone 20 mg po every day  Start exercise and a natural diuretic such as watermelon.  R[facility]uxamab in late August in [facility]  If new relapse consider Acthar gel insted of IV steroids  Keep Rx of Prednisone and if Sx start then call me but if not answer in 24 hrs then start first dose.  We will give letter to increase work hours to 12-24 a week.  CBD oil for headaches OK  We will start Topamax if headaches.  Finally I will follow the patient up in 2 month(s) as long as the patient is doing well. I instructed the patient to call or mychart my office with any concerns or questions.    I spent 45 minutes in this visit, with >50% direct patient time spent counseling about prognosis, treatment options, and coordination of care.     My recommendations will be communicated back to the patient's physician(s) by mail.  Follow-up is expected to be with me.      Lea Barragan MD  Chief, Multiple Sclerosis Division  Department of Neurology  [facility] [place]      (Chart documentation was completed in part with Dragon voice-recognition software. Even though reviewed, some grammatical, spelling, and word errors may remain.)       Again, thank you for allowing me to participate in the care of your patient.      Sincerely,    Lea Barragan MD

## 2019-06-25 ENCOUNTER — TELEPHONE (OUTPATIENT)
Dept: FAMILY MEDICINE | Facility: OTHER | Age: 49
End: 2019-06-25

## 2019-06-25 NOTE — TELEPHONE ENCOUNTER
Spoke with patient's  and he stated they just got a call right before they would have left for the apt today.  They just found out that she will not qualify for FMLA after 6/30.  They are still unsure why and are working on figuring out what to do next.  They were told she may have to go thru an ADA process and would be in touch if and when they need further assistance. But just wanted you to know they are sorry for waisting any of your time here in clinic today.     Sanna Kidd LPN........................6/25/2019  3:53 PM

## 2019-06-25 NOTE — TELEPHONE ENCOUNTER
Tayler Higgins-pt unable to keep the appointment scheduled for today. Would like to speak with you. Please call. Thank you. Consuelo Walker

## 2019-07-05 ENCOUNTER — HOSPITAL ENCOUNTER (EMERGENCY)
Facility: OTHER | Age: 49
Discharge: HOME | End: 2019-07-05
Payer: COMMERCIAL

## 2019-07-05 ENCOUNTER — NURSE TRIAGE (OUTPATIENT)
Dept: FAMILY MEDICINE | Facility: OTHER | Age: 49
End: 2019-07-05

## 2019-07-05 ENCOUNTER — OFFICE VISIT (OUTPATIENT)
Dept: FAMILY MEDICINE | Facility: OTHER | Age: 49
End: 2019-07-05
Attending: NURSE PRACTITIONER
Payer: COMMERCIAL

## 2019-07-05 VITALS
TEMPERATURE: 98.9 F | SYSTOLIC BLOOD PRESSURE: 139 MMHG | WEIGHT: 185.2 LBS | HEART RATE: 85 BPM | DIASTOLIC BLOOD PRESSURE: 92 MMHG | RESPIRATION RATE: 18 BRPM | OXYGEN SATURATION: 93 % | BODY MASS INDEX: 34.99 KG/M2

## 2019-07-05 VITALS
TEMPERATURE: 98.6 F | WEIGHT: 186 LBS | HEIGHT: 61 IN | HEART RATE: 83 BPM | BODY MASS INDEX: 35.12 KG/M2 | SYSTOLIC BLOOD PRESSURE: 159 MMHG | DIASTOLIC BLOOD PRESSURE: 88 MMHG | OXYGEN SATURATION: 93 %

## 2019-07-05 DIAGNOSIS — G36.0 NEUROMYELITIS OPTICA SPECTRUM DISORDER (H): ICD-10-CM

## 2019-07-05 DIAGNOSIS — Z79.52 LONG TERM (CURRENT) USE OF SYSTEMIC STEROIDS: ICD-10-CM

## 2019-07-05 DIAGNOSIS — J20.9 ACUTE BRONCHITIS, UNSPECIFIED ORGANISM: Primary | ICD-10-CM

## 2019-07-05 PROCEDURE — 99213 OFFICE O/P EST LOW 20 MIN: CPT | Performed by: FAMILY MEDICINE

## 2019-07-05 RX ORDER — PREDNISONE 50 MG/1
TABLET ORAL
Refills: 1 | COMMUNITY
Start: 2019-06-25 | End: 2019-07-05

## 2019-07-05 RX ORDER — SULFAMETHOXAZOLE/TRIMETHOPRIM 800-160 MG
1 TABLET ORAL
Qty: 30 TABLET | Refills: 1 | COMMUNITY
Start: 2019-07-05 | End: 2019-11-14

## 2019-07-05 RX ORDER — DIPHENHYDRAMINE HCL 25 MG
25 CAPSULE ORAL EVERY 6 HOURS PRN
COMMUNITY

## 2019-07-05 RX ORDER — AZITHROMYCIN 250 MG/1
TABLET, FILM COATED ORAL
Qty: 6 TABLET | Refills: 0 | Status: SHIPPED | OUTPATIENT
Start: 2019-07-05 | End: 2019-07-09

## 2019-07-05 RX ORDER — NICOTINE POLACRILEX 4 MG/1
20 GUM, CHEWING ORAL DAILY
COMMUNITY

## 2019-07-05 RX ORDER — ACETAMINOPHEN 500 MG
1000 TABLET ORAL EVERY 6 HOURS PRN
COMMUNITY

## 2019-07-05 RX ORDER — PREDNISONE 50 MG/1
TABLET ORAL PRN
Refills: 1 | COMMUNITY
Start: 2019-07-05

## 2019-07-05 RX ORDER — ALBUTEROL SULFATE 90 UG/1
2 AEROSOL, METERED RESPIRATORY (INHALATION) EVERY 6 HOURS
Qty: 1 INHALER | Refills: 0 | Status: SHIPPED | OUTPATIENT
Start: 2019-07-05

## 2019-07-05 ASSESSMENT — MIFFLIN-ST. JEOR: SCORE: 1411.07

## 2019-07-05 ASSESSMENT — ENCOUNTER SYMPTOMS
FATIGUE: 1
FEVER: 1
DIZZINESS: 1

## 2019-07-05 NOTE — PATIENT INSTRUCTIONS
If you are noticing:  Persistent coughing and wheezing symptoms - call about using the higher prednisone dose

## 2019-07-05 NOTE — TELEPHONE ENCOUNTER
Patients  states she has a cough, chills, chest congestion, sinus pressure and some body aches. She would like to be seen by you today due to her immune system issues if possible.    Called Pt's  [facility], after verifying Pt's last name and . 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 Has seen Coby Higgins for this. Pt has had tuxin treatments that lower her immunity. She takes prednisone and is on antibiotic to fight off special types of pneumonia. Pt once  sternum due to severe coughing.    Additional Information    Negative: Bluish (or gray) lips or face    Negative: Severe difficulty breathing (e.g., struggling for each breath, speaks in single words)    Negative: Rapid onset of cough and has hives    Negative: Coughing started suddenly after medicine, an allergic food or bee sting    Negative: Difficulty breathing after exposure to flames, smoke, or fumes    Negative: Sounds like a life-threatening emergency to the triager    Negative: Previous asthma attacks and this feels like asthma attack    Negative: Chest pain present when not coughing    Negative: Difficulty breathing    Negative: Passed out (i.e., fainted, collapsed and was not responding)    Negative: Patient sounds very sick or weak to the triager    Negative: Coughed up > 1 tablespoon (15 ml) blood (Exception: blood-tinged sputum)    Negative: Fever > 101 F (38.3 C) and over 60 years of age    Fever > 100.0 F (37.8 C) and has diabetes mellitus or a weak immune system (e.g., HIV positive, cancer chemotherapy, organ transplant, splenectomy, chronic steroids)     Temperature not assessed, however Pt complained of chills this morning, and has auto-immune disorder.    Negative: Wheezing is present    Negative: Increasing ankle swelling    Negative: Coughing up ar-colored (reddish-brown) or blood-tinged sputum    Using nasal washes and pain medicine >  "24 hours and sinus pain persists    Commented on: SEVERE coughing spells (e.g., whooping sound after coughing, vomiting after coughing)     Pt's  describes as severe, but not to the point of vomiting.    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 Has seen Coby Higgins for this. Pt has had tuxin treatments that lower her immunity. She takes prednisone and is on antibiotic to fight off special types of pneumonia. Pt once  sternum due to severe coughing.    1. ONSET: \"When did the cough begin?\"   3-4 days ago    2. SEVERITY: \"How bad is the cough today?\"   Severe coughing spells, starting to get sore from coughing.    3. RESPIRATORY DISTRESS: \"Describe your breathing.\"   Denies obvious difficulty    4. FEVER: \"Do you have a fever?\" If so, ask: \"What is your temperature, how was it measured, and when did it start?\"  Temperature not assessed, however Pt c/o chills this morning.    5. SPUTUM: \"Describe the color of your sputum\" (clear, white, yellow, green)  Yellow.    6. HEMOPTYSIS: \"Are you coughing up any blood?\" If so ask: \"How much?\" (flecks, streaks, tablespoons, etc.)  Denies.    7. CARDIAC HISTORY: \"Do you have any history of heart disease?\" (e.g., heart attack, congestive heart failure)       *No Answer*  8. LUNG HISTORY: \"Do you have any history of lung disease?\"  (e.g., pulmonary embolus, asthma, emphysema)      *No Answer*    9. PE RISK FACTORS: \"Do you have a history of blood clots?\" (or: recent major surgery, recent prolonged travel, bedridden )  Denies.     10. OTHER SYMPTOMS: \"Do you have any other symptoms?\" (e.g., runny nose, wheezing, chest pain)  Chest congestion, sinus pressure, body aches, foggy vision, itchy eyes, sore throat.    Pt did try benadryl allergy sinus medication with little relief. Pt is having a hard time sleeping, due to persistent productive cough.    Answer Assessment - Initial Assessment " "Questions  1. ONSET: \"When did the cough begin?\"   3-4 days ago.    2. SEVERITY: \"How bad is the cough today?\"   Severe persistent and productive coughing spells, starting to get sore from coughing. Producing yellow-colored phlegm.    3. RESPIRATORY DISTRESS: \"Describe your breathing.\"   Denies difficulty.    4. FEVER: \"Do you have a fever?\" If so, ask: \"What is your temperature, how was it measured, and when did it start?\"  Temperature not assessed, however Pt c/o chills this morning.    5. HEMOPTYSIS: \"Are you coughing up any blood?\" If so ask: \"How much?\" (flecks, streaks, tablespoons, etc.)  Denies.    6. TREATMENT: \"What have you done so far to treat the cough?\" (e.g., meds, fluids, humidifier)  Pt did try benadryl allergy sinus medication with little relief. Pt is having a hard time sleeping, due to persistent productive cough.    7. CARDIAC HISTORY: \"Do you have any history of heart disease?\" (e.g., heart attack, congestive heart failure)   Denies.    8. LUNG HISTORY: \"Do you have any history of lung disease?\"  (e.g., pulmonary embolus, asthma, emphysema)  Denies.    9. PE RISK FACTORS: \"Do you have a history of blood clots?\" (or: recent major surgery, recent prolonged travel, bedridden )  Denies.    10. OTHER SYMPTOMS: \"Do you have any other symptoms? (e.g., runny nose, wheezing, chest pain)  Chest congestion, sinus pressure, body aches, foggy vision, itchy eyes, sore throat.    Protocols used: COUGH-A-OH, COUGH - ACUTE [id number]    Protocol recommends immediate office evaluation. Home care advice given per protocol.  requesting same-day work-in appointment with Coby Higgins, as she is familiar with Pt's auto-immune disorder.     Ruchi Tom RN .............. 7/5/2019  9:24 AM            "

## 2019-07-05 NOTE — TELEPHONE ENCOUNTER
Patients  states she has a cough, chills, chest congestion, sinus pressure and some body aches.      She would like to be seen by you today due to her immune system issues if possible    Please call with a work in appointment or to advise    Thank you

## 2019-07-05 NOTE — TELEPHONE ENCOUNTER
Per phone conversation with Coby Higgins, she is unable to see Pt today, however, due to Pt's complicated history and the critical things that may be taking place, she recommends Pt be seen in the ED for complete expedited work-up ( including full chest work-up, labs, and/or chest imaging if found appropriate), to determine if symptoms may be related to her auto-immune disorder. If Pt is unwilling to go to ED, Pt should see another [facility] clinic provider today. Also, if Pt is experiencing subtle neurological changes, such as her foggy vision, her neurologist who sees her for her optic neuritis, should be notified.    Returned call to Patient's . He was notified of the above recommendation. He notes that his wife did go into work today, but expressed understanding and verbalized plan to bring his wife to ED for evaluation. Ruchi Tom RN .............. 7/5/2019  10:08 AM

## 2019-07-05 NOTE — TELEPHONE ENCOUNTER
S-(situation): Pt's  requesting work in appointment with Coby Higgins for cough and associated symptoms.    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 Has seen Coby Higgins for this. Pt has had tuxin treatments that lower her immunity. She takes prednisone and is Bactrim DS/Septra DS to prevent special types of pneumonia. Pt once  sternum due to severe coughing.    A-(assessment): Productive persistant and severe cough with yellow-colored phlegm, along with chills, chest congestion, sinus pressure, body aches, foggy vision (attributes to anti-Mog), itchy eyes and sore throat, and difficulty sleeping, for the past 3-4 days. Denies: difficulty breathing, chest pain, coughing up blood, wheezing, vomiting. Tried benadryl allergy sinus medication with little relief. Pt is having a hard time sleeping, due to persistent productive cough.    R-(recommendations): Protocol recommends immediate office evaluation. Home care advice given per protocol.  requesting same-day work-in appointment with Coby Higgins, as she is familiar with Pt's auto-immune disorder.     Ruchi Tom RN .............. 7/5/2019  9:31 AM

## 2019-07-05 NOTE — ED TRIAGE NOTES
"Pt. Coughing and nasal drainage, bilateral ear pain, post nasal drip, teeth hurt, throat scratchy and sore, eyes watery, yellow sputum, headache.  Left eye \"fuzzy\" yesterday, today better.  Pt. Has permanent eye damage to right eye due to anti-MOG (autoimmune disorder).  Took tylenol at 0630 for headache.  Now afebrile, but c/o chills and hot flashes.  "

## 2019-07-05 NOTE — PROGRESS NOTES
Nursing Notes:   Alondra Paredes LPN  7/5/2019  3:26 PM  Signed          Medication Reconciliation: complete    Alondra Paredes LPN      SUBJECTIVE:   CC:  Kerrie Patel is a 49 year old female who presents to clinic today for the following health issues:  congestion    HPI  Kerrie Patel is a 49 year old female presents for evaluation of 4 days of congestion and cough.  They had been in [place] before the onset of symptoms.  Coughing up yellow phlegm.  Clear nasal drainage with this going down her throat too.  Chills and fevers.    Light headed and dizzy yesterday.  She has not taken anything for her symptoms at this time.  Should be noted, she does take prednisone daily.  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  They report that immunizations are up-to-date.  They are concerned about infection versus allergic type symptoms.    Blurred vision from her left eye -that is typically her good eye.  She had this yesterday, seems to have improved somewhat.  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  Currently taking prednisone 20mg a day chronically.  Required plasmapheresis in May.  Last significant respiratory infection was a few years ago.      Allergies   Allergen Reactions     Amoxicillin Nausea and Vomiting     Lactose      Other reaction(s): GI Bleeding     Methylprednisolone GI Disturbance     Pancreatitis when using high dose steroids, tolerates low dose steroids     Current Outpatient Medications   Medication     acetaminophen (TYLENOL) 500 MG tablet     albuterol (PROAIR HFA/PROVENTIL HFA/VENTOLIN HFA) 108 (90 Base) MCG/ACT inhaler     azithromycin (ZITHROMAX) 250 MG tablet     calcium carbonate (OS-LISA) 1500 (600 Ca) MG tablet     Cholecalciferol 5000 units TABS     Dextromethorphan HBr 3 MG/ML suspension     diphenhydrAMINE (BENADRYL) 25 MG capsule     melatonin 1 MG TABS tablet     omeprazole 20 MG tablet     pantoprazole (PROTONIX) 40 MG EC  "tablet     predniSONE (DELTASONE) 20 MG tablet     predniSONE (DELTASONE) 50 MG tablet     sulfamethoxazole-trimethoprim (BACTRIM DS/SEPTRA DS) 800-160 MG tablet     topiramate (TOPAMAX) 25 MG tablet     No current facility-administered medications for this visit.       Past Medical History:   Diagnosis Date     Diffuse cystic mastopathy of breast     No Comments Provided     Family history of diabetes mellitus     No Comments Provided     Gastro-esophageal reflux disease without esophagitis     No Comments Provided     Monoallelic mutation of MOG gene 2019    \"Anti - MOG\"     Optic neuritis 2019     Pain in thoracic spine     No Comments Provided      Past Surgical History:   Procedure Laterality Date      SECTION      96, 99     CHOLECYSTECTOMY           IR CVC NON TUNNEL PLACEMENT  2019     OTHER SURGICAL HISTORY      2012,QAS195,PREMALIG/BENIGN SKIN LESION EXCISION,BREAST LESION     OTHER SURGICAL HISTORY      2017,07902.0,FL TLH W TUBES/OVAR 250 G OR LESS     Family History   Problem Relation Age of Onset     Diabetes Father         Diabetes,Type 2, Diverticulitis age 47      Breast Cancer No family hx of         Cancer-breast     Glaucoma No family hx of      Macular Degeneration No family hx of        Review of Systems   Constitutional: Positive for fatigue and fever.   HENT: Negative for ear pain.    Eyes: Positive for visual disturbance.   Neurological: Positive for dizziness.        PHQ-2 Score:     PHQ-2 (  [employer]) 2019   Q1: Little interest or pleasure in doing things 0 0   Q2: Feeling down, depressed or hopeless 0 0   PHQ-2 Score 0 0         No flowsheet data found.      OBJECTIVE:     BP (!) 139/92 (BP Location: Right arm, Patient Position: Sitting, Cuff Size: Adult Regular)   Pulse 85   Temp 98.9  F (37.2  C)   Resp 18   Wt 84 kg (185 lb 3.2 oz)   SpO2 93%   BMI 34.99 kg/m    Body mass index is 34.99 kg/m .    General shows alert adult female.  " HEENT hepatic membranes are normal.  Nares with purulent nasal drainage.  She has mild cobblestoning of posterior pharynx.  She has mild tenderness of nasal sinuses.  No cervical adenopathy.  Cardia vascular regular rhythm, no murmur.  Lungs she has end inspiratory wheezing, this is heard more right side than left.    ASSESSMENT/PLAN:       ICD-10-CM    1. Acute bronchitis, unspecified organism J20.9 azithromycin (ZITHROMAX) 250 MG tablet     albuterol (PROAIR HFA/PROVENTIL HFA/VENTOLIN HFA) 108 (90 Base) MCG/ACT inhaler   2. 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 Long term (current) use of systemic steroids Z79.52             PLAN:  1.  Given patient's immunosuppressed state, decision was made to proceed with treatment with Zithromax 500 mg today followed by 250 each next 4 days.  She is also given prescription for albuterol inhaler 2 puffs every 4 hours as needed for cough/wheezing.  The antibiotic listed in her chart she takes regularly, 3 times a week.  2.  Discussed her current steroid dosing of prednisone 20 mg daily.  She does have 50 mg tablets at home.  We discussed indications for her to contact her neurologist and discuss a stress dose increased.    If she is not improving over the next 48 hours, would recommend follow-up assessment.  HUEY MICHAELS MD  [facility]    This note was created using voice recognition software and was screened for errors in transcription.

## 2019-07-08 ENCOUNTER — MYC MEDICAL ADVICE (OUTPATIENT)
Dept: NEUROLOGY | Facility: CLINIC | Age: 49
End: 2019-07-08

## 2019-07-08 DIAGNOSIS — H46.9 OPTIC NEURITIS: Primary | ICD-10-CM

## 2019-07-08 DIAGNOSIS — G36.0 NEUROMYELITIS OPTICA SPECTRUM DISORDER (H): ICD-10-CM

## 2019-07-09 ENCOUNTER — OFFICE VISIT (OUTPATIENT)
Dept: FAMILY MEDICINE | Facility: OTHER | Age: 49
End: 2019-07-09
Attending: NURSE PRACTITIONER
Payer: COMMERCIAL

## 2019-07-09 ENCOUNTER — HOSPITAL ENCOUNTER (OUTPATIENT)
Dept: GENERAL RADIOLOGY | Facility: OTHER | Age: 49
Discharge: HOME | End: 2019-07-09
Attending: NURSE PRACTITIONER | Admitting: NURSE PRACTITIONER
Payer: COMMERCIAL

## 2019-07-09 VITALS
SYSTOLIC BLOOD PRESSURE: 138 MMHG | TEMPERATURE: 98.3 F | OXYGEN SATURATION: 95 % | WEIGHT: 184 LBS | BODY MASS INDEX: 34.74 KG/M2 | RESPIRATION RATE: 17 BRPM | HEIGHT: 61 IN | HEART RATE: 78 BPM | DIASTOLIC BLOOD PRESSURE: 92 MMHG

## 2019-07-09 DIAGNOSIS — D84.9 IMMUNOSUPPRESSION (H): ICD-10-CM

## 2019-07-09 DIAGNOSIS — R05.9 COUGH: ICD-10-CM

## 2019-07-09 DIAGNOSIS — R05.9 COUGH: Primary | ICD-10-CM

## 2019-07-09 PROCEDURE — 99214 OFFICE O/P EST MOD 30 MIN: CPT | Performed by: NURSE PRACTITIONER

## 2019-07-09 PROCEDURE — 71046 X-RAY EXAM CHEST 2 VIEWS: CPT

## 2019-07-09 RX ORDER — AZITHROMYCIN 250 MG/1
250 TABLET, FILM COATED ORAL DAILY
Qty: 2 TABLET | Refills: 0 | Status: SHIPPED | OUTPATIENT
Start: 2019-07-09 | End: 2019-10-03

## 2019-07-09 SDOH — HEALTH STABILITY: MENTAL HEALTH: HOW OFTEN DO YOU HAVE A DRINK CONTAINING ALCOHOL?: NEVER

## 2019-07-09 ASSESSMENT — PAIN SCALES - GENERAL: PAINLEVEL: MILD PAIN (3)

## 2019-07-09 ASSESSMENT — ENCOUNTER SYMPTOMS: COUGH: 1

## 2019-07-09 ASSESSMENT — MIFFLIN-ST. JEOR: SCORE: 1402

## 2019-07-09 NOTE — NURSING NOTE
Patient presents to clinic today for a follow up on chest congestions and cough. She states she is feeling better.     No LMP recorded. Patient has had a hysterectomy.  Medication Reconciliation: complete    Reyna Ramirez LPN  7/9/2019 1:20 PM

## 2019-07-09 NOTE — TELEPHONE ENCOUNTER
Dr. Barragan, please see patient's MyChart message (from the spouse); Ultimately, Ruperto is asking to speak with you directly; Would you like a phone visit appointment scheduled with them? How would you like to go about this? Thank you.    Sana Carcamo, MS RN Care Coordinator

## 2019-07-09 NOTE — PROGRESS NOTES
Nursing Notes:   Reyna Ramirez, LPN  7/9/2019  1:46 PM  Signed  Patient presents to clinic today for a follow up on chest congestions and cough. She states she is feeling better.     No LMP recorded. Patient has had a hysterectomy.  Medication Reconciliation: complete    Reyna RamirezEFREM  7/9/2019 1:20 PM    Nursing note reviewed with patient.  Accurracy and completeness verified.   Ms. Patel is a 49 year old female who:  Patient presents with:  Follow Up      ICD-10-CM    1. Cough R05 XR Chest 2 Views     azithromycin (ZITHROMAX) 250 MG tablet   2. Immunosuppression (H) D89.9 XR Chest 2 Views     HPI    Presents for follow-up on cough and congestion--- has been visiting family members and potential exposures-- has not been ill  Currently being managed [facility] neurology and ophthalmology for an optic neuritis  Currently on prednisone 20 mg daily she was on--- higher doses and has been trying to get her weight down  Was started on Topamax by neurology recently which is helpful with suppressing her appetite  She was seen at the [facility] clinic on Friday, July 5 started on albuterol and a Z-Bandar for bronchitis treatment  Given some cough syrup for rest--she denies any posttussive emesis  No nausea or GI symptoms feels--she is making some improvement  Uses the albuterol about once a day  There is no history of any obstructive lung disease or reactive airway  No known seasonal allergies or triggers  Did have some postnasal drainage that has resolved  Confusion on Z-Bandar administration and took 2 tabs second day--- for 4-day instead of 5-day treatment      Her  was concerned about directions for starting the 50 mg prednisone if breathing or cough worsens  And would like direct clarification from Dr. Barragan in neurology--- he has placed a call and is waiting for response    reports she may need to apply for short-term disability for her employer as FMLA has been used up    Review of  Systems   HENT: Positive for postnasal drip.    Respiratory: Positive for cough.    All other systems reviewed and are negative.     All other systems reviewed and negative.     NOEL:   No flowsheet data found.  PHQ9:  No flowsheet data found.    I have personally reviewed the past medical history, past surgical history, medications, allergies, family and social history as listed below, on 7/9/2019.    Allergies   Allergen Reactions     Amoxicillin Nausea and Vomiting     Lactose      Other reaction(s): GI Bleeding     Methylprednisolone GI Disturbance     Pancreatitis when using high dose steroids, tolerates low dose steroids       Current Outpatient Medications   Medication Sig Dispense Refill     acetaminophen (TYLENOL) 500 MG tablet Take 1,000 mg by mouth every 6 hours as needed for mild pain       albuterol (PROAIR HFA/PROVENTIL HFA/VENTOLIN HFA) 108 (90 Base) MCG/ACT inhaler Inhale 2 puffs into the lungs every 6 hours 1 Inhaler 0     azithromycin (ZITHROMAX) 250 MG tablet Take 1 tablet (250 mg) by mouth daily for 2 days 2 tablet 0     calcium carbonate (OS-LISA) 1500 (600 Ca) MG tablet Take 1 tablet (600 mg) by mouth daily 30 tablet 11     Cholecalciferol 5000 units TABS Take 5,000 Units by mouth daily 30 tablet 11     Dextromethorphan HBr 3 MG/ML suspension Take by mouth 4 times daily as needed for cough       diphenhydrAMINE (BENADRYL) 25 MG capsule Take 25 mg by mouth every 6 hours as needed for itching or allergies       melatonin 1 MG TABS tablet Take 1 mg by mouth nightly as needed for sleep       omeprazole 20 MG tablet Take 20 mg by mouth daily       pantoprazole (PROTONIX) 40 MG EC tablet Take 1 tablet (40 mg) by mouth daily 60 tablet 1     predniSONE (DELTASONE) 20 MG tablet Take 1 tablet (20 mg) by mouth daily 30 tablet 1     predniSONE (DELTASONE) 50 MG tablet Take by mouth as needed  1     sulfamethoxazole-trimethoprim (BACTRIM DS/SEPTRA DS) 800-160 MG tablet Take 1 tablet by mouth three times a  "week 30 tablet 1     topiramate (TOPAMAX) 25 MG tablet Take 1 tablet (25 mg) by mouth 2 times daily 60 tablet 3        Patient Active Problem List    Diagnosis Date Noted     Neuromyelitis optica spectrum disorder (H) 2019     Priority: Medium     Demyelinating disease of the spinal cord (H) 2019     Priority: Medium     Acute pancreatitis 2019     Priority: Medium     Optic neuritis 2019     Priority: Medium     Slow transit constipation 2019     Priority: Medium     Lactose intolerance in adult 2019     Priority: Medium     Family history of diabetes mellitus 2018     Priority: Medium     Pelvic pain 2017     Priority: Medium     Fibrocystic breast disease 2012     Priority: Medium     Past Medical History:   Diagnosis Date     Diffuse cystic mastopathy of breast     No Comments Provided     Family history of diabetes mellitus     No Comments Provided     Gastro-esophageal reflux disease without esophagitis     No Comments Provided     Monoallelic mutation of MOG gene 2019    \"Anti - MOG\"     Optic neuritis 2019     Pain in thoracic spine     No Comments Provided     Past Surgical History:   Procedure Laterality Date      SECTION      96, 99     CHOLECYSTECTOMY           IR CVC NON TUNNEL PLACEMENT  2019     OTHER SURGICAL HISTORY      2012,BKP942,PREMALIG/BENIGN SKIN LESION EXCISION,BREAST LESION     OTHER SURGICAL HISTORY      2017,17287.0,DC TLH W TUBES/OVAR 250 G OR LESS     Social History     Socioeconomic History     Marital status:      Spouse name: None     Number of children: None     Years of education: None     Highest education level: None   Occupational History     None   Social Needs     Financial resource strain: None     Food insecurity:     Worry: None     Inability: None     Transportation needs:     Medical: None     Non-medical: None   Tobacco Use     Smoking status: Never Smoker     Smokeless tobacco: " "Never Used   Substance and Sexual Activity     Alcohol use: No     Alcohol/week: 0.0 oz     Frequency: Never     Comment: rare     Drug use: No     Comment: Drug use: No     Sexual activity: Not Currently     Partners: Male   Lifestyle     Physical activity:     Days per week: None     Minutes per session: None     Stress: None   Relationships     Social connections:     Talks on phone: None     Gets together: None     Attends Quaker service: None     Active member of club or organization: None     Attends meetings of clubs or organizations: None     Relationship status: None     Intimate partner violence:     Fear of current or ex partner: None     Emotionally abused: None     Physically abused: None     Forced sexual activity: None   Other Topics Concern     Parent/sibling w/ CABG, MI or angioplasty before 65F 55M? 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  Spouse, Ruperto, teaches people how to fight fires and has even done this internationally.  .  Has 2 sons, Chuckie, 1/25/1996, Jordy, 2/23/1999.     Family History   Problem Relation Age of Onset     Diabetes Father         Diabetes,Type 2, Diverticulitis age 47      Breast Cancer No family hx of         Cancer-breast     Glaucoma No family hx of      Macular Degeneration No family hx of        EXAM:   Vitals:    07/09/19 1321   BP: (!) 138/92   BP Location: Right arm   Patient Position: Sitting   Cuff Size: Adult Regular   Pulse: 78   Resp: 17   Temp: 98.3  F (36.8  C)   TempSrc: Tympanic   SpO2: 95%   Weight: 83.5 kg (184 lb)   Height: 1.549 m (5' 1\")       Current Pain Score: Mild Pain (3)     BP Readings from Last 3 Encounters:   07/09/19 (!) 138/92   07/05/19 (!) 139/92   06/19/19 160/87      Wt Readings from Last 3 Encounters:   07/09/19 83.5 kg (184 lb)   07/05/19 84 kg (185 lb 3.2 oz)   05/31/19 78.1 kg (172 lb 3.2 oz)      Estimated body mass index is 34.77 kg/m  as calculated from the " "following:    Height as of this encounter: 1.549 m (5' 1\").    Weight as of this encounter: 83.5 kg (184 lb).     Physical Exam   Constitutional: She is oriented to person, place, and time. She appears well-developed and well-nourished.   Neck: Normal range of motion. Neck supple.   Cardiovascular: Normal rate and regular rhythm.   Pulmonary/Chest: Effort normal. She has wheezes.   Few expiratory wheezes upper fields, no crackles or rhonchi   Musculoskeletal: Normal range of motion.   Neurological: She is alert and oriented to person, place, and time.   Skin: Skin is warm and dry.   Psychiatric: She has a normal mood and affect. Her behavior is normal. Judgment and thought content normal.   Nursing note and vitals reviewed.       INVESTIGATIONS:    7/9/19  2:14 PM [id number] [facility]    PACS Images      Show images for XR Chest 2 Views   Study Result     PROCEDURE:  XR CHEST 2 VW     HISTORY:  Cough; Immunosuppression (H).      COMPARISON:  None.     FINDINGS:   The cardiac silhouette is normal in size. The pulmonary vasculature is  normal.  The lungs are clear. No pleural effusion or pneumothorax.                                                                      IMPRESSION:  No acute cardiopulmonary disease.       MICHELLE ANTONY MD           ASSESSMENT AND PLAN:  Problem List Items Addressed This Visit     None      Visit Diagnoses     Cough    -  Primary    Relevant Medications    azithromycin (ZITHROMAX) 250 MG tablet    Other Relevant Orders    XR Chest 2 Views (Completed)    Immunosuppression (H)        Relevant Orders    XR Chest 2 Views (Completed)        Reviewed negative chest x-ray with patient and --provided reassurance  History and clinical exam consistent with bronchitis with viral inflammation  Seems to be responding well to Z-Bandar and PRN MDI    Cough is lessening and is able to rest at night  Overall she feels she is making improvement    We will add on additional day " of azithromycin--we will provide some anti-inflammatory benefits as well  She is certainly at risk for developing pneumonia  With immune compromise    They will continue supportive therapies with hot tea, fluids    Directed to return to clinic promptly if any relapse or abrupt worsening    Her  will call if any additional documentation is needed for employer        -- Expected clinical course discussed    -- Medications and their side effects discussed    There are no Patient Instructions on file for this visit.  Coby Higgins [facility]     Portions of this note were dictated using speech recognition software. The note has been proofread but errors in the text may have been overlooked. Please contact me if there are any concerns regarding the accuracy of the dictation.

## 2019-07-11 NOTE — TELEPHONE ENCOUNTER
I called the patient's spouse to discuss her disability application and a letter; I talked with Ruperto at length about what the form needs and the reasonable accommodations requested; The letter provided to the patient's spouse from March, Ruperto is wanting to know if Dr. Barragan feels it needs anything added to it; I will review the paperwork and letter with Dr. Barragan; Ruperto would like the paperwork emailed back to him.    Sana Carcamo, MS RN Care Coordinator

## 2019-07-11 NOTE — TELEPHONE ENCOUNTER
Rest of paperwork completed by provider-Forms faxed as requested to [employer] with a cover letter.  Copied mailed to patient- copied scanned to chart- copy left for Sana MARTIN RN coordinator records. In additiona Dr Barragan has nothing to add to letter that patients  is requesting. Letter can be drafted based on that information with a new date per Dr Barragan.   Aubree Cline MA     Yes

## 2019-07-15 NOTE — TELEPHONE ENCOUNTER
A new letter has been drafted for the patient's spouse; Ruperto is wondering if the plan is to continue Kerrie on prednisone 20mg daily, as if that is the case, she needs a new prescription; Patient is scheduled with Dr. Barragan on 8/21/19; Dr. Barragan, should the prednisone be refilled? Thank you.    Sana Carcamo, MS RN Care Coordinator

## 2019-07-18 RX ORDER — PREDNISONE 20 MG/1
20 TABLET ORAL DAILY
Qty: 30 TABLET | Refills: 1 | Status: SHIPPED | OUTPATIENT
Start: 2019-07-18 | End: 2019-08-21

## 2019-07-18 NOTE — TELEPHONE ENCOUNTER
I received the following response from Dr. Barragan:    Yes continue prednisone 20 mg every day until I see her, please refill. FN     [employer] message sent to patient/spouse with this information; New prescription sent to patient's pharmacy.    Sana Carcamo, MS RN Care Coordinator

## 2019-08-12 NOTE — TELEPHONE ENCOUNTER
Patient's spouse sent [employer] message stating that additional paperwork needs to be filled out for [employer] [facility]; [employer] message sent providing our fax number and inquiring as to where the paperwork should be sent once completed.    Sana Carcamo, MS RN Care Coordinator

## 2019-08-15 ENCOUNTER — TELEPHONE (OUTPATIENT)
Dept: NEUROLOGY | Facility: CLINIC | Age: 49
End: 2019-08-15

## 2019-08-15 RX ORDER — HEPARIN SODIUM,PORCINE 10 UNIT/ML
5 VIAL (ML) INTRAVENOUS
Status: CANCELLED | OUTPATIENT
Start: 2019-08-15

## 2019-08-15 RX ORDER — DIPHENHYDRAMINE HCL 25 MG
50 CAPSULE ORAL ONCE
Status: CANCELLED
Start: 2019-08-15

## 2019-08-15 RX ORDER — ACETAMINOPHEN 325 MG/1
650 TABLET ORAL ONCE
Status: CANCELLED
Start: 2019-08-15

## 2019-08-15 RX ORDER — METHYLPREDNISOLONE SODIUM SUCCINATE 125 MG/2ML
125 INJECTION, POWDER, LYOPHILIZED, FOR SOLUTION INTRAMUSCULAR; INTRAVENOUS ONCE
Status: CANCELLED | OUTPATIENT
Start: 2019-08-15

## 2019-08-15 RX ORDER — HEPARIN SODIUM (PORCINE) LOCK FLUSH IV SOLN 100 UNIT/ML 100 UNIT/ML
5 SOLUTION INTRAVENOUS
Status: CANCELLED | OUTPATIENT
Start: 2019-08-15

## 2019-08-15 NOTE — TELEPHONE ENCOUNTER
"Patient's spouse, Ruperto sent a [employer] message stating that Kerrie has been having some symptoms; I called Ruperto and Kerrie to discuss this further; In talking with Kerrie, she says that she has been doing well, until the end of last week; She noticed that she started getting some numbness of her left leg and foot, which she says comes and goes and consists of the whole left foot/leg and was not a gradual spreading; She denies any associated weakness of the left leg/foot; Kerrie tells me that on Tuesday her balance was off, primarily with going up and down stairs, which she chose to lean up against the wall when walking; This symptom has improved; She denies any dizziness; She tells me that she does have a headache, which she \"hasn't had in a long time\" and she describes this as a pressure in her left eye area and temporal region \"like a heavy weight is on my eye\"; She has taken acetaminophen 1000mg this morning, and it has not helped; She denies any vision changes or eye pain with movement; Kerrie states that she has had \"a lot of shaking in my arms and hands\", which is very minimal today; She denies any bowel/bladder changes or incontinence; She has not been sick lately; Kerrie is scheduled with Dr. Barragan next week, but they are wondering if the Rituximab has \"worn off\" and if she should have her next Rituximab infusion sooner rather than later; She does not want to do plasmapheresis; I told them that I would send this over to Dr. Barragan and call them back later today.    Sana Carcamo, MS RN Care Coordinator  "

## 2019-08-15 NOTE — TELEPHONE ENCOUNTER
Dr. Barragan signed the Rituximab therapy plan orders; I was able to get the patient scheduled for 9/5/19; I called Ruperto and discussed this with him; Kerrie would like to have her CD19 count checked at [facility] (phone [phone] fax [phone]); This order has been faxed.    Sana Carcamo, MS RN Care Coordinator

## 2019-08-15 NOTE — TELEPHONE ENCOUNTER
Health Call Center    Phone Message    May a detailed message be left on voicemail: yes    Reason for Call: Symptoms or Concerns     If patient has red-flag symptoms, warm transfer to triage line    Current symptom or concern: Headaches, sensitivity to light, numbness     Symptoms have been present for:  1 week(s)    Has patient previously been seen for this? No      Are there any new or worsening symptoms? No    Please call call him back as soon as possible to discuss.       Action Taken: Message routed to:  [facility] ([facility] sahrda neuro

## 2019-08-15 NOTE — TELEPHONE ENCOUNTER
Dr. Barragan would like the patient to watch her symptoms for now, get some rest and to come to the ER if symptoms worsen over the weekend; Kerrie verbalized understanding of this; They want to know if Kerrie could at least get scheduled for the infusion; I told them that there are no current orders, which I can talk with Dr. Barragan about, and they were fine with that; In talking with Dr. Barragan, she said that orders for Rituximab 1000mg IV x1 dose could be pended to her for signature; We could also check a CD19 count at this time, and that order has been placed; I will route these orders over to Dr. Barragan for review and signature.    Sana Carcamo, MS RN Care Coordinator

## 2019-08-16 ENCOUNTER — TRANSFERRED RECORDS (OUTPATIENT)
Dept: HEALTH INFORMATION MANAGEMENT | Facility: CLINIC | Age: 49
End: 2019-08-16

## 2019-08-16 DIAGNOSIS — H46.9 OPTIC NEURITIS: ICD-10-CM

## 2019-08-16 PROCEDURE — 36415 COLL VENOUS BLD VENIPUNCTURE: CPT | Performed by: PSYCHIATRY & NEUROLOGY

## 2019-08-16 PROCEDURE — 86355 B CELLS TOTAL COUNT: CPT | Performed by: PSYCHIATRY & NEUROLOGY

## 2019-08-17 LAB
CD19 CELLS # BLD: <1 CELLS/UL (ref 107–698)
CD19 CELLS NFR BLD: <1 % (ref 6–27)

## 2019-08-20 NOTE — TELEPHONE ENCOUNTER
Patient's B cells came back still depleted; MyChart message sent to patient letting her know this.    Sana Carcamo, MS RN Care Coordinator

## 2019-08-21 ENCOUNTER — OFFICE VISIT (OUTPATIENT)
Dept: NEUROLOGY | Facility: CLINIC | Age: 49
End: 2019-08-21
Attending: PSYCHIATRY & NEUROLOGY
Payer: COMMERCIAL

## 2019-08-21 VITALS
DIASTOLIC BLOOD PRESSURE: 93 MMHG | BODY MASS INDEX: 34.77 KG/M2 | SYSTOLIC BLOOD PRESSURE: 153 MMHG | HEIGHT: 61 IN | HEART RATE: 76 BPM

## 2019-08-21 DIAGNOSIS — G36.0 NEUROMYELITIS OPTICA SPECTRUM DISORDER (H): ICD-10-CM

## 2019-08-21 RX ORDER — PREDNISONE 20 MG/1
20 TABLET ORAL DAILY
Qty: 30 TABLET | Refills: 1 | Status: SHIPPED | OUTPATIENT
Start: 2019-08-21 | End: 2019-11-12

## 2019-08-21 ASSESSMENT — PAIN SCALES - GENERAL: PAINLEVEL: NO PAIN (0)

## 2019-08-21 NOTE — LETTER
8/21/2019       RE: Kerrie Patel  [id number]  [place] [address]     Dear Colleague,    Thank you for referring your patient, Kerrie Patel, to the [facility] MULTIPLE SCLEROSIS at [facility]. Please see a copy of my visit note below.    THE [facility] MULTIPLE SCLEROSIS CLINIC  FOLLOW UP VISIT           PRINCIPAL NEUROLOGIC DIAGNOSIS: Optic Neuritis        HISTORY OF ILLNESS:    This is a follow visit for this 49 year old right handed genetic female  With a history of Anti-MOG syndrome. Who was last seen on  6-19.   At that time the patient was recommended to:     Continue prednisone 20 mg po every day  Start exercise and a natural diuretic such as watermelon.  Rituxamab in late August in [facility]  If new relapse consider Acthar gel insted of IV steroids  Keep Rx of Prednisone and if Sx start then call me but if not answer in 24 hrs then start first dose.  We will give letter to increase work hours to 12-24 a week.  CBD oil for headaches OK  We will start Topamax if headaches.     Since last visit, she continued to use CBD oil for headaches which have improved but she also started Topamax and is currently on 25 mg po BID, she has not had any for 1 month. She lost 4 lbs and is also eating watermelons.    She was doing well until last week when she develop balance issues, pain around her face, especially a prickly feeling around the jaw, numbness in the LLE x 1 day and tremors x 3 days, firs in her hands and later extend to her shoulders. It is not visible but she feels it. She is still working 28.5 hrs  On average 20 hours a week but she took the day off when she felt bad. She is scheduled to go from 8-1 PM in September. Yesterday during th storm her L eye was fussy which has been a pattern.    Current Symptoms:  1.  2.  3.              Current Outpatient Prescriptions:  Current Outpatient Medications   Medication     acetaminophen (TYLENOL) 500 MG tablet      albuterol (PROAIR HFA/PROVENTIL HFA/VENTOLIN HFA) 108 (90 Base) MCG/ACT inhaler     calcium carbonate (OS-LISA) 1500 (600 Ca) MG tablet     Cholecalciferol 5000 units TABS     Dextromethorphan HBr 3 MG/ML suspension     diphenhydrAMINE (BENADRYL) 25 MG capsule     melatonin 1 MG TABS tablet     omeprazole 20 MG tablet     pantoprazole (PROTONIX) 40 MG EC tablet     predniSONE (DELTASONE) 20 MG tablet     predniSONE (DELTASONE) 50 MG tablet     sulfamethoxazole-trimethoprim (BACTRIM DS/SEPTRA DS) 800-160 MG tablet     topiramate (TOPAMAX) 25 MG tablet     No current facility-administered medications for this visit.           ALLERGIES       Allergies   Allergen Reactions     Amoxicillin Nausea and Vomiting     Lactose      Other reaction(s): GI Bleeding     Methylprednisolone GI Disturbance     Pancreatitis when using high dose steroids, tolerates low dose steroids           REVIEW OF SYSTEMS:    Comprehensive review of systems otherwise was negative, including constitutional, head and neck, cardiovascular, pulmonary, gastrointestinal, endocrine, urologic, reproductive, rheumatic, hematologic, immunologic, dermatologic, and psychiatric.    Nutritional concerns: None  Driving issues: None   Safety concerns regarding living situations and safety at home: None  Risk of falls: None  Pain: None    PHYSICAL EXAM:    Hair, skin, nails, and joints were normal. Neck was supple without Lhermitte's phenomenon.  There was no percussion tenderness over the spine.     The patient was alert and oriented to person, place, and time with normal language, attention and concentration, recent and remote memory, praxis, and intellectual function. Affect was normal. The patient did not appear depressed.    Visual acuity:  OD 20/60  OS 20/20   Correction: without    Visual fields were full to confrontation.   Pupils were 3 mm and briskly reactive OU without a relative afferent pupillary defect.  Funduscopic examination was normal without  disc edema, erythema, or atrophy.  Extraocular movements: Intact without BINU  Facial sensation is normal. Normal strength of the muscles of mastication:   Muscles of facial expression were normal  Hearing was normal. Gag reflex and palatal movements were normal. Sternocleidomastoid and trapezius power were normal. Tongue movements were normal. There was no dysarthria.    Motor Examination:   There was pronator drift.       Motor    Upper      Right Left   Shoulder Abduction 5 5   Elbow Flexion 5 5   Elbow Extension 5 5   Wrist Extension 5 5   Digit Extension 5 5   Digit Flexion 5 5   APB 5 5   Tone 0 0   Lower       Right Left   Hip Flexion 5 5   Knee Extension 5 5   Knee Flexion 5 5   Foot Dorsiflexion 5 5   Foot Plantar Flexion 5 5   EH 5 5   Toe Flexion 5 5   Tone 0 0               Reflexes:     Reflexes       Right  Left   Biceps 1  1   Triceps 1  1   Brachioradialis 1  1   Patellar  1  1   Achilles 1  1   Babinski down  down         Coordination:     Right Left   RRM Normal Normal   ROSA Normal Normal   FTN Normal Normal   RRM Normal Normal   HKS Normal Normal         Sensory examination:    Light touch:  Intact in all extremities      Coordination and Gait        Gait Normal   Right Left   Romberg Normal  Heel Normal Normal   Tandem {Normal  Toe Normal Normal                   QUANTITATIVE SCORES:    Visual: 2<-2-Worse eye with scotoma with maximal visual acuity (corrected) of 20/30-20/59  Brainstem: 1-Signs only  Pyramidal: 1-Signs only  Cerebellar: 0-Normal  Sensory: 0-Normal  Bladder/Bowel: 0-Normal  Cerebral: 0-Normal  Ambulatory: 0-Unrestricted    EDSS: 2.0- minimal disability in one FS (one FS grade 2, others 0 or 1)        ASSESSMENT:    nti MOG syndrome with no evidence of relapse only worsening of old symptoms due to exertion and weather changes. CD 19 count, < 1 Rituxamab due end of September    PLAN:    CD 19 count before next Rituxamab dose end of September. Decrease Prednisone to 10 mg po every  day. Continue Topamax 25 mg po BID.    Finally I will follow the patient up in 2 month(s) as long as the patient is doing well. I instructed the patient to call or mychart my office with any concerns or questions.    I spent 35 minutes in this visit, with >50% direct patient time spent counseling about prognosis, treatment options, and coordination of care.     My recommendations will be communicated back to the patient's physician(s) by mail.  Follow-up is expected to be with me.      Lea Barragan MD  Chief, Multiple Sclerosis Division  Department of Neurology  [facility]      (Chart documentation was completed in part with Dragon voice-recognition software. Even though reviewed, some grammatical, spelling, and word errors may remain.)       Again, thank you for allowing me to participate in the care of your patient.      Sincerely,    Lea Barragan MD

## 2019-08-21 NOTE — TELEPHONE ENCOUNTER
Paperwork received from [facility], completed during visit today, copy given to patient as well as faxed back to facility. Rx fax confirmed receipt of paperwork  Copy scanned to patient chart  Aubree Cline MA

## 2019-08-21 NOTE — PROGRESS NOTES
THE [facility] MULTIPLE SCLEROSIS CLINIC  FOLLOW UP VISIT           PRINCIPAL NEUROLOGIC DIAGNOSIS: Optic Neuritis        HISTORY OF ILLNESS:    This is a follow visit for this 49 year old right handed genetic female  With a history of Anti-MOG syndrome. Who was last seen on  6-19.   At that time the patient was recommended to:     Continue prednisone 20 mg po every day  Start exercise and a natural diuretic such as watermelon.  Rituxamab in late August in [facility]  If new relapse consider Acthar gel insted of IV steroids  Keep Rx of Prednisone and if Sx start then call me but if not answer in 24 hrs then start first dose.  We will give letter to increase work hours to 12-24 a week.  CBD oil for headaches OK  We will start Topamax if headaches.     Since last visit, she continued to use CBD oil for headaches which have improved but she also started Topamax and is currently on 25 mg po BID, she has not had any for 1 month. She lost 4 lbs and is also eating watermelons.    She was doing well until last week when she develop balance issues, pain around her face, especially a prickly feeling around the jaw, numbness in the LLE x 1 day and tremors x 3 days, firs in her hands and later extend to her shoulders. It is not visible but she feels it. She is still working 28.5 hrs  On average 20 hours a week but she took the day off when she felt bad. She is scheduled to go from 8-1 PM in September. Yesterday during th storm her L eye was fussy which has been a pattern.    Current Symptoms:  1.  2.  3.              Current Outpatient Prescriptions:  Current Outpatient Medications   Medication     acetaminophen (TYLENOL) 500 MG tablet     albuterol (PROAIR HFA/PROVENTIL HFA/VENTOLIN HFA) 108 (90 Base) MCG/ACT inhaler     calcium carbonate (OS-LISA) 1500 (600 Ca) MG tablet     Cholecalciferol 5000 units TABS     Dextromethorphan HBr 3 MG/ML suspension     diphenhydrAMINE (BENADRYL) 25 MG capsule     melatonin 1 MG  TABS tablet     omeprazole 20 MG tablet     pantoprazole (PROTONIX) 40 MG EC tablet     predniSONE (DELTASONE) 20 MG tablet     predniSONE (DELTASONE) 50 MG tablet     sulfamethoxazole-trimethoprim (BACTRIM DS/SEPTRA DS) 800-160 MG tablet     topiramate (TOPAMAX) 25 MG tablet     No current facility-administered medications for this visit.           ALLERGIES       Allergies   Allergen Reactions     Amoxicillin Nausea and Vomiting     Lactose      Other reaction(s): GI Bleeding     Methylprednisolone GI Disturbance     Pancreatitis when using high dose steroids, tolerates low dose steroids           REVIEW OF SYSTEMS:    Comprehensive review of systems otherwise was negative, including constitutional, head and neck, cardiovascular, pulmonary, gastrointestinal, endocrine, urologic, reproductive, rheumatic, hematologic, immunologic, dermatologic, and psychiatric.    Nutritional concerns: None  Driving issues: None   Safety concerns regarding living situations and safety at home: None  Risk of falls: None  Pain: None    PHYSICAL EXAM:    Hair, skin, nails, and joints were normal. Neck was supple without Lhermitte's phenomenon.  There was no percussion tenderness over the spine.     The patient was alert and oriented to person, place, and time with normal language, attention and concentration, recent and remote memory, praxis, and intellectual function. Affect was normal. The patient did not appear depressed.    Visual acuity:  OD 20/60  OS 20/20   Correction: without    Visual fields were full to confrontation.   Pupils were 3 mm and briskly reactive OU without a relative afferent pupillary defect.  Funduscopic examination was normal without disc edema, erythema, or atrophy.  Extraocular movements: Intact without BINU  Facial sensation is normal. Normal strength of the muscles of mastication:   Muscles of facial expression were normal  Hearing was normal. Gag reflex and palatal movements were normal. Sternocleidomastoid  and trapezius power were normal. Tongue movements were normal. There was no dysarthria.    Motor Examination:   There was pronator drift.       Motor    Upper      Right Left   Shoulder Abduction 5 5   Elbow Flexion 5 5   Elbow Extension 5 5   Wrist Extension 5 5   Digit Extension 5 5   Digit Flexion 5 5   APB 5 5   Tone 0 0   Lower       Right Left   Hip Flexion 5 5   Knee Extension 5 5   Knee Flexion 5 5   Foot Dorsiflexion 5 5   Foot Plantar Flexion 5 5   EH 5 5   Toe Flexion 5 5   Tone 0 0               Reflexes:     Reflexes       Right  Left   Biceps 1  1   Triceps 1  1   Brachioradialis 1  1   Patellar  1  1   Achilles 1  1   Babinski down  down         Coordination:     Right Left   RRM Normal Normal   ROSA Normal Normal   FTN Normal Normal   RRM Normal Normal   HKS Normal Normal         Sensory examination:    Light touch:  Intact in all extremities      Coordination and Gait        Gait Normal   Right Left   Romberg Normal  Heel Normal Normal   Tandem {Normal  Toe Normal Normal                   QUANTITATIVE SCORES:    Visual: 2<-2-Worse eye with scotoma with maximal visual acuity (corrected) of 20/30-20/59  Brainstem: 1-Signs only  Pyramidal: 1-Signs only  Cerebellar: 0-Normal  Sensory: 0-Normal  Bladder/Bowel: 0-Normal  Cerebral: 0-Normal  Ambulatory: 0-Unrestricted    EDSS: 2.0- minimal disability in one FS (one FS grade 2, others 0 or 1)        ASSESSMENT:    nti MOG syndrome with no evidence of relapse only worsening of old symptoms due to exertion and weather changes. CD 19 count, < 1 Rituxamab due end of September    PLAN:    CD 19 count before next Rituxamab dose end of September. Decrease Prednisone to 10 mg po every day. Continue Topamax 25 mg po BID.    Finally I will follow the patient up in 2 month(s) as long as the patient is doing well. I instructed the patient to call or mychart my office with any concerns or questions.    I spent 35 minutes in this visit, with >50% direct patient time spent  counseling about prognosis, treatment options, and coordination of care.     My recommendations will be communicated back to the patient's physician(s) by mail.  Follow-up is expected to be with me.      Lea Barragan MD  Chief, Multiple Sclerosis Division  Department of Neurology  [facility] [place]      (Chart documentation was completed in part with Dragon voice-recognition software. Even though reviewed, some grammatical, spelling, and word errors may remain.)

## 2019-09-04 ENCOUNTER — TELEPHONE (OUTPATIENT)
Dept: NEUROLOGY | Facility: CLINIC | Age: 49
End: 2019-09-04

## 2019-09-04 DIAGNOSIS — G37.9 CNS DEMYELINATING DISEASE (H): Primary | ICD-10-CM

## 2019-09-04 NOTE — TELEPHONE ENCOUNTER
Paged by infusion specialist stating that patient's rituximab infusion appointment tomorrow has not been secured by insurance. After reviewing Dr. Barragan's documentation, infusion is not due until the end of September and the patient should have her CD19 count checked prior to that (mid-September). Infusion appointment tomorrow will be cancelled.  I will have [facility] contact them to set up an appointment at the end of September. I will send a future reminder to the nurse pool to remind patient to have lab checked in about 2 weeks.     I spoke to patient and her  and they are quite upset about this change as they were under the assumption the infusion was due and Dr. Barragan didn't mention anything about this at her recent visit. They want to know who will reimburse their hotel costs and lost wages for missing work. I apologized and stated I would have a  them to discuss these concerns.

## 2019-09-05 NOTE — TELEPHONE ENCOUNTER
"I received a message from Monalisa Lazcano that the patient and spouse have questions about any symptoms they should keep an eye out for with the fact that her Rituximab infusion is delayed; Dr. Barragan is out of the office, but Dr. Mazariegos is covering, who is also familiar with the patient's condition; The patient's most recent CD19 B cell count from 8/16/19 shows an absolute CD19 count of <1, and per Dr. Barragan's note from 8/21/19, the patient can get her next Rituximab infusion in late September, with another CD19 check prior to that; Patient was originally scheduled for infusion 9/4/19, however, insurance was not secured, therefore, the infusion was cancelled; Dr. Mazariegos, ultimately what I would like to know, to share with the patient and spouse, is what symptoms they should watch for and if she will \"be okay\" until she is able to get her next dose Rituximab; Thank you.    Sana Carcamo, MS RN Care Coordinator    "

## 2019-09-05 NOTE — TELEPHONE ENCOUNTER
I checked with the infusion center, and the prior auth process could take up to 14 days, and that does not include if the PA is denied and needs to be appealed; I called Ruperto and Kerrie to discuss this process and Dr. Mazariegos's input; I tried to comfort them to the best of my ability with the fact that since Kerrie's B cells were still depleted as of 3 weeks ago, that is a good thing given the fact that her infusion is being delayed; We agreed that she will check her CD19 B cell count on 9/16/19, and I have scheduled her infusion for 10/3/19; The patient would like to check her lab at [facility] (phone [phone] fax [phone]); Lab order placed per Dr. Mazariegos, on behalf of Dr. Barragan, and I will fax that once it has been cosigned; Ruperto and Kerrie are in agreement with this plan; Patient also stated that she will stay on the current prednisone 20mg daily dosing until she is able to get her Rituximab, at which time she is supposed to change to alternating prednisone 20mg-10mg every other day; She is also continuing the Bactrim three times weekly; I will forward this to Dr. Barragan for review for when she is back in the office.    Sana Carcamo, MS RN Care Coordinator

## 2019-09-05 NOTE — TELEPHONE ENCOUNTER
Her CD19 count was undetectable within the last three weeks. Generally we try to maintain the CD19 count at 2% or less, and it is highly unlikely that her counts would rebound from zero to this level within a few weeks. The plan to recheck her CD19 again in a month is entirely appropriate.    Regardless, any persistent changes in vision (particularly new unilateral or bilateral blurred vision associated with pain with eye movement) or new changes in strength or sensation that persist for more than 24 hours should be reported to us.

## 2019-09-05 NOTE — TELEPHONE ENCOUNTER
Lab order signed by Dr. Mazariegos; This has been faxed accordingly.    Sana Carcamo MS RN Care Coordinator

## 2019-09-16 ENCOUNTER — MEDICAL CORRESPONDENCE (OUTPATIENT)
Dept: HEALTH INFORMATION MANAGEMENT | Facility: OTHER | Age: 49
End: 2019-09-16

## 2019-09-16 ENCOUNTER — TRANSFERRED RECORDS (OUTPATIENT)
Dept: HEALTH INFORMATION MANAGEMENT | Facility: CLINIC | Age: 49
End: 2019-09-16

## 2019-09-16 DIAGNOSIS — G37.9 CNS DEMYELINATING DISEASE (H): ICD-10-CM

## 2019-09-16 PROCEDURE — 86355 B CELLS TOTAL COUNT: CPT | Mod: ZL | Performed by: PSYCHIATRY & NEUROLOGY

## 2019-09-16 PROCEDURE — 36415 COLL VENOUS BLD VENIPUNCTURE: CPT | Mod: ZL | Performed by: PSYCHIATRY & NEUROLOGY

## 2019-09-17 LAB
CD19 CELLS # BLD: <1 CELLS/UL (ref 107–698)
CD19 CELLS NFR BLD: <1 % (ref 6–27)

## 2019-10-03 ENCOUNTER — INFUSION THERAPY VISIT (OUTPATIENT)
Dept: INFUSION THERAPY | Facility: CLINIC | Age: 49
End: 2019-10-03
Attending: PSYCHIATRY & NEUROLOGY
Payer: COMMERCIAL

## 2019-10-03 VITALS
SYSTOLIC BLOOD PRESSURE: 130 MMHG | WEIGHT: 189.7 LBS | OXYGEN SATURATION: 97 % | BODY MASS INDEX: 35.84 KG/M2 | DIASTOLIC BLOOD PRESSURE: 84 MMHG | HEART RATE: 80 BPM | TEMPERATURE: 98 F | RESPIRATION RATE: 18 BRPM

## 2019-10-03 DIAGNOSIS — H46.9 OPTIC NEURITIS: Primary | ICD-10-CM

## 2019-10-03 PROCEDURE — 96375 TX/PRO/DX INJ NEW DRUG ADDON: CPT

## 2019-10-03 PROCEDURE — 96413 CHEMO IV INFUSION 1 HR: CPT

## 2019-10-03 PROCEDURE — 96415 CHEMO IV INFUSION ADDL HR: CPT

## 2019-10-03 PROCEDURE — 25800030 ZZH RX IP 258 OP 636: Mod: ZF | Performed by: PSYCHIATRY & NEUROLOGY

## 2019-10-03 PROCEDURE — 25000128 H RX IP 250 OP 636: Mod: ZF | Performed by: PSYCHIATRY & NEUROLOGY

## 2019-10-03 PROCEDURE — 40000556 ZZH STATISTIC PERIPHERAL IV START W US GUIDANCE: Mod: ZF

## 2019-10-03 PROCEDURE — 25000132 ZZH RX MED GY IP 250 OP 250 PS 637: Mod: ZF | Performed by: PSYCHIATRY & NEUROLOGY

## 2019-10-03 RX ORDER — DIPHENHYDRAMINE HCL 25 MG
50 CAPSULE ORAL ONCE
Status: COMPLETED | OUTPATIENT
Start: 2019-10-03 | End: 2019-10-03

## 2019-10-03 RX ORDER — DIPHENHYDRAMINE HCL 25 MG
50 CAPSULE ORAL ONCE
Status: CANCELLED
Start: 2019-10-03

## 2019-10-03 RX ORDER — ACETAMINOPHEN 325 MG/1
650 TABLET ORAL ONCE
Status: COMPLETED | OUTPATIENT
Start: 2019-10-03 | End: 2019-10-03

## 2019-10-03 RX ORDER — CYANOCOBALAMIN (VITAMIN B-12) 500 MCG
400 LOZENGE ORAL DAILY
COMMUNITY

## 2019-10-03 RX ORDER — HEPARIN SODIUM (PORCINE) LOCK FLUSH IV SOLN 100 UNIT/ML 100 UNIT/ML
5 SOLUTION INTRAVENOUS
Status: CANCELLED | OUTPATIENT
Start: 2019-10-03

## 2019-10-03 RX ORDER — METHYLPREDNISOLONE SODIUM SUCCINATE 125 MG/2ML
125 INJECTION, POWDER, LYOPHILIZED, FOR SOLUTION INTRAMUSCULAR; INTRAVENOUS ONCE
Status: CANCELLED | OUTPATIENT
Start: 2019-10-03

## 2019-10-03 RX ORDER — ACETAMINOPHEN 325 MG/1
650 TABLET ORAL ONCE
Status: CANCELLED
Start: 2019-10-03

## 2019-10-03 RX ORDER — METHYLPREDNISOLONE SODIUM SUCCINATE 125 MG/2ML
125 INJECTION, POWDER, LYOPHILIZED, FOR SOLUTION INTRAMUSCULAR; INTRAVENOUS ONCE
Status: COMPLETED | OUTPATIENT
Start: 2019-10-03 | End: 2019-10-03

## 2019-10-03 RX ORDER — HEPARIN SODIUM,PORCINE 10 UNIT/ML
5 VIAL (ML) INTRAVENOUS
Status: CANCELLED | OUTPATIENT
Start: 2019-10-03

## 2019-10-03 RX ADMIN — RITUXIMAB 1000 MG: 10 INJECTION, SOLUTION INTRAVENOUS at 10:29

## 2019-10-03 RX ADMIN — METHYLPREDNISOLONE SODIUM SUCCINATE 125 MG: 125 INJECTION, POWDER, FOR SOLUTION INTRAMUSCULAR; INTRAVENOUS at 09:28

## 2019-10-03 RX ADMIN — DIPHENHYDRAMINE HYDROCHLORIDE 50 MG: 25 CAPSULE ORAL at 09:28

## 2019-10-03 RX ADMIN — ACETAMINOPHEN 650 MG: 325 TABLET ORAL at 09:28

## 2019-10-03 NOTE — PROGRESS NOTES
Nursing Note  Kerrie Patel presents [facility] for:   Chief Complaint   Patient presents with     Infusion     Rituxan     During today's [facility] appointment, orders from Dr. Barragan were completed.  Frequency: once    Progress note:  Patient identification verified by name and date of birth.  Assessment completed.  Vitals recorded in Doc Flowsheets.  Patient was provided with education regarding infusion and possible side effects.  Patient verbalized understanding.  1. Elevated temperature, fever, chills, productive cough or abnormal vital signs, night sweats, coughing up blood or sputum, no appetite or abnormal vital signs, SOB : NO    2. Open wounds or new incisions: NO    3. Recent hospitalization: NO    4.  Recent surgeries:  NO    5. Any upcoming surgeries or dental procedures?:NO    6. Any current or recent bouts of illness or infection? On any antibiotics? : MD aware, prophylactic dosing.    7. Any new, sudden or worsening abdominal pain :NO    8. Vaccination within 4 weeks? Patient or someone in the household is scheduled to receive vaccination? No live virus vaccines prior to or during treatment :NO    9. Any nervous system diseases [i.e. multiple sclerosis, Guillain-[place], seizures, neurological  Changes]  Ask if on a biologic medication* : NO    10. Pregnant or breast feeding; or plans on pregnancy in the future: NO    11. Signs of worsening depression or suicidal ideations while taking benlysta:NO    12. New-onset medical symptoms: NO    13.  New cancer diagnosis or on chemotherapy or radiation NO    14.  Evaluate for any sign of active TB [Unexplained weight loss, Loss of appetite, Night sweats, Fever, Fatigue, Chills, Coughing for 3 weeks or longer, Hemoptysis (coughing up blood), Chest pain]: NO    **Note: If answered yes to any of the above, hold the infusion and contact ordering provider.     present during visit today:  Not Applicable.    Premedications: administered per order.    Infusion length and rate:  infusion given over approximately 3 hours.  infusion starts at 50 ml/hr, then increased by 30 ml/hr every 30 minutes to final rate of 350 ml/hr. Tolerated OK (Patient preferred this dose today, last infusion only tolerated 250 ml/hr.max rate)    Labs: were not ordered for this appointment.    Vascular access: peripheral IV was placed by vascular access RN.    Treatment Conditions: RN reviewed the following with patient: Medication hand-out provided to patient, Inform patient if any fever, chills or signs of infection, new symptoms, abdominal pain, heart palpitations, shortness of breath, reaction, weakness, neurological changes, seek medical attention immediately and should not receive infusions. No live virus vaccines prior to or during treatment or up to 6 months post infusion. If the patient has an upcoming procedure or surgery, this should be discussed with the rheumatologist and surgeon or provider.  patient denies fever, chills, signs of infection, recent illness, on antibiotics, productive cough or elevated temperature.    Post Infusion Assessment:  Patient tolerated infusion without incident.   Discharge Plan:   Follow up plan of care with: primary medical doctor.  Discharge instructions were reviewed with patient.  Patient/representative verbalized understanding of discharge instructions and all questions answered.  Patient discharged from [facility] in stable condition.    Linh Andre RN       Administrations This Visit     acetaminophen (TYLENOL) tablet 650 mg     Admin Date  10/03/2019 Action  Given Dose  650 mg Route  Oral Administered By  Linh Andre RN          diphenhydrAMINE (BENADRYL) capsule 50 mg     Admin Date  10/03/2019 Action  Given Dose  50 mg Route  Oral Administered By  Linh Andre RN          methylPREDNISolone sodium succinate (solu-MEDROL) injection  125 mg     Admin Date  10/03/2019 Action  Given Dose  125 mg Route  Intravenous Administered By  Linh Andre, RN          riTUXimab (RITUXAN) 1,000 mg in sodium chloride 0.9 % 1,000 mL non-oncology use     Admin Date  10/03/2019 Action  New Bag Dose  1000 mg Route  Intravenous Administered By  Linh Andre, RN                  BP (!) 153/89   Pulse 80   Temp 98  F (36.7  C) (Oral)   Resp 18   Wt 86 kg (189 lb 11.2 oz)   SpO2 97%   BMI 35.84 kg/m

## 2019-10-03 NOTE — PATIENT INSTRUCTIONS
Patient Education     Rituximab Solution for injection  What is this medicine?  RITUXIMAB (ri TUX i mab) is a monoclonal antibody. This medicine changes the way the body's immune system works. It is used commonly to treat non-Hodgkin lymphoma and other conditions. In cancer cells, this drug targets a specific protein within cancer cells and stops the cancer cells from growing. It is also used to treat rheumatoid arthritis (RA). In RA, this medicine slows the inflammatory process and help reduce joint pain and swelling. This medicine is often used with other cancer or arthritis medications.  This medicine may be used for other purposes; ask your health care provider or pharmacist if you have questions.  What should I tell my health care provider before I take this medicine?  They need to know if you have any of these conditions:    blood disorders    heart disease    history of hepatitis B    infection (especially a virus infection such as chickenpox, cold sores, or herpes)    irregular heartbeat    kidney disease    lung or breathing disease, like asthma    lupus    an unusual or allergic reaction to rituximab, mouse proteins, other medicines, foods, dyes, or preservatives    pregnant or trying to get pregnant    breast-feeding  How should I use this medicine?  This medicine is for infusion into a vein. It is administered in a hospital or clinic by a specially trained health care professional.  A special MedGuide will be given to you by the pharmacist with each prescription and refill. Be sure to read this information carefully each time.  Talk to your pediatrician regarding the use of this medicine in children. This medicine is not approved for use in children.  Overdosage: If you think you have taken too much of this medicine contact a poison control center or emergency room at once.  NOTE: This medicine is only for you. Do not share this medicine with others.  What if I miss a dose?  It is important not to miss  a dose. Call your doctor or health care professional if you are unable to keep an appointment.  What may interact with this medicine?    cisplatin    medicines for blood pressure    some other medicines for arthritis    vaccines  This list may not describe all possible interactions. Give your health care provider a list of all the medicines, herbs, non-prescription drugs, or dietary supplements you use. Also tell them if you smoke, drink alcohol, or use illegal drugs. Some items may interact with your medicine.  What should I watch for while using this medicine?  Report any side effects that you notice during your treatment right away, such as changes in your breathing, fever, chills, dizziness or lightheadedness. These effects are more common with the first dose.  Visit your prescriber or health care professional for checks on your progress. You will need to have regular blood work. Report any other side effects. The side effects of this medicine can continue after you finish your treatment. Continue your course of treatment even though you feel ill unless your doctor tells you to stop.  Call your doctor or health care professional for advice if you get a fever, chills or sore throat, or other symptoms of a cold or flu. Do not treat yourself. This drug decreases your body's ability to fight infections. Try to avoid being around people who are sick.  This medicine may increase your risk to bruise or bleed. Call your doctor or health care professional if you notice any unusual bleeding.  Be careful brushing and flossing your teeth or using a toothpick because you may get an infection or bleed more easily. If you have any dental work done, tell your dentist you are receiving this medicine.  Avoid taking products that contain aspirin, acetaminophen, ibuprofen, naproxen, or ketoprofen unless instructed by your doctor. These medicines may hide a fever.  Do not become pregnant while taking this medicine. Women should  inform their doctor if they wish to become pregnant or think they might be pregnant. There is a potential for serious side effects to an unborn child. Talk to your health care professional or pharmacist for more information. Do not breast-feed an infant while taking this medicine.  What side effects may I notice from receiving this medicine?  Side effects that you should report to your doctor or health care professional as soon as possible:    allergic reactions like skin rash, itching or hives, swelling of the face, lips, or tongue    low blood counts - this medicine may decrease the number of white blood cells, red blood cells and platelets. You may be at increased risk for infections and bleeding.    signs of infection - fever or chills, cough, sore throat, pain or difficulty passing urine    signs of decreased platelets or bleeding - bruising, pinpoint red spots on the skin, black, tarry stools, blood in the urine    signs of decreased red blood cells - unusually weak or tired, fainting spells, lightheadedness    breathing problems    confused, not responsive    chest pain    fast, irregular heartbeat    feeling faint or lightheaded, falls    mouth sores    redness, blistering, peeling or loosening of the skin, including inside the mouth    stomach pain    swelling of the ankles, feet, or hands    trouble passing urine or change in the amount of urine  Side effects that usually do not require medical attention (report to your doctor or other health care professional if they continue or are bothersome):    anxiety    headache    loss of appetite    muscle aches    nausea    night sweats  This list may not describe all possible side effects. Call your doctor for medical advice about side effects. You may report side effects to FDA at 8-910-FDA-7275.  Where should I keep my medicine?  This drug is given in a hospital or clinic and will not be stored at home.  NOTE:This sheet is a summary. It may not cover all  possible information. If you have questions about this medicine, talk to your doctor, pharmacist, or health care provider. Copyright  2016 Gold Standard

## 2019-10-24 NOTE — ADDENDUM NOTE
Encounter addended by: Sofía Okeefe PT on: 10/24/2019 10:09 AM   Actions taken: Episode resolved, Clinical Note Signed

## 2019-11-06 ENCOUNTER — TELEPHONE (OUTPATIENT)
Dept: FAMILY MEDICINE | Facility: OTHER | Age: 49
End: 2019-11-06

## 2019-11-06 ENCOUNTER — HOSPITAL ENCOUNTER (OUTPATIENT)
Dept: MAMMOGRAPHY | Facility: OTHER | Age: 49
Discharge: HOME | End: 2019-11-06
Attending: NURSE PRACTITIONER | Admitting: NURSE PRACTITIONER
Payer: COMMERCIAL

## 2019-11-06 DIAGNOSIS — Z12.31 VISIT FOR SCREENING MAMMOGRAM: ICD-10-CM

## 2019-11-06 PROCEDURE — 77063 BREAST TOMOSYNTHESIS BI: CPT

## 2019-11-06 NOTE — TELEPHONE ENCOUNTER
Please call Kerrie    I would suggest that Kerrie establish care with internal medicine--- she has been mostly managed by neurology  And co-managed here all the notes have been available for review from the [facility]--so I am certain internal medicine would be the best fit with her history and they have access to all of her records which will be helpful    Dr Ortiz, Dr Cespedes, Helen Higgins, [place]   November 6, 2019

## 2019-11-06 NOTE — TELEPHONE ENCOUNTER
Patient found out from another provider that Coby is leaving. She is concerned about having a provider who is familiar with her autoimmune disorder and would like to know who Coby recommends she establish care with.  Please contact patient.   Libby Chong on 11/6/2019 at 9:11 AM

## 2019-11-06 NOTE — TELEPHONE ENCOUNTER
Patient notified of Coby Higgins FNP response below.  Mona Lobo LPN........................11/6/2019  1:01 PM

## 2019-11-08 NOTE — ADDENDUM NOTE
Encounter addended by: Kerline Vazquez OTR on: 11/8/2019 9:03 AM   Actions taken: Pend clinical note, Flowsheet accepted, Clinical Note Signed, Episode resolved

## 2020-02-05 ENCOUNTER — MYC MEDICAL ADVICE (OUTPATIENT)
Dept: NEUROLOGY | Facility: CLINIC | Age: 50
End: 2020-02-05

## 2020-02-05 DIAGNOSIS — H46.9 OPTIC NEURITIS, RIGHT: ICD-10-CM

## 2020-02-05 DIAGNOSIS — G44.229 CHRONIC TENSION-TYPE HEADACHE, NOT INTRACTABLE: ICD-10-CM

## 2020-02-05 DIAGNOSIS — G44.219 EPISODIC TENSION-TYPE HEADACHE, NOT INTRACTABLE: ICD-10-CM

## 2020-02-06 NOTE — TELEPHONE ENCOUNTER
Patient's spouse sent [employer] message update; They are still living in [place], as they are trying to sell their house; Here are their following questions:    1) Kerrie was last seen by Dr. Barragan in August - Dr. Barragan, do you want to see her again for a follow up appointment to discuss treatment plan? Her last Rituximab infusion was in October.    2) Kerrie is looking in to disability halfway through her employer, which has paperwork that would need to be filled out. They are looking in to this due to ongoing symptoms (headache, blurry vision, fatigue) and inability to work full time. Dr. Barragan, is this something you would support?    3) Kerrie would like to reduce her steroids further. She is currently taking prednisone 10mg by mouth daily. Dr. Barragan, is this possible? Or should be discussed in person at an appointment? She would also like a refill of her topiramate.    Will route to Dr. Barragan for input/feedback.    Sana Carcamo, MS RN Care Coordinator

## 2020-02-10 RX ORDER — TOPIRAMATE 25 MG/1
25 TABLET, FILM COATED ORAL 2 TIMES DAILY
Qty: 60 TABLET | Refills: 3 | Status: SHIPPED | OUTPATIENT
Start: 2020-02-10

## 2020-02-10 NOTE — TELEPHONE ENCOUNTER
I received the following response from Dr. Barragan:    I can see her in April with CD19, I can only support the neurological findings documented, approving disability is not up to me is up to SSD based on my notes which will state facts.   OK to refill topiramate and decrease prednisone to 10 mg qod    MyChart message sent to patient/spouse with this information; Rx refilled per MS refill protocol.    Sana Carcamo, MS RN Care Coordinator

## 2020-02-11 NOTE — TELEPHONE ENCOUNTER
Patient will let us know when paperwork faxed, so we can keep an eye out for it; Ruperto confirmed that they understand the process for disability; Aubree, please call patient to schedule a follow up appointment with Dr. Barragan; Dr. Barragan stated April, but they may be moving in April, so they will likely want to schedule for March sometime, which is fine; Thank you.    Sana Carcamo, MS RN Care Coordinator

## 2020-02-11 NOTE — TELEPHONE ENCOUNTER
Scheduled for March 18th 2020 to see Dr Barragan. Patient explained that we might not be getting paperwork until her  gets back from out of town    Aubree Cline MA

## 2020-03-11 ENCOUNTER — HEALTH MAINTENANCE LETTER (OUTPATIENT)
Age: 50
End: 2020-03-11

## 2020-03-12 ENCOUNTER — MYC MEDICAL ADVICE (OUTPATIENT)
Dept: NEUROLOGY | Facility: CLINIC | Age: 50
End: 2020-03-12

## 2020-03-12 DIAGNOSIS — G36.0 NEUROMYELITIS OPTICA SPECTRUM DISORDER (H): ICD-10-CM

## 2020-03-12 RX ORDER — SULFAMETHOXAZOLE/TRIMETHOPRIM 800-160 MG
1 TABLET ORAL
Qty: 30 TABLET | Refills: 1 | Status: SHIPPED | OUTPATIENT
Start: 2020-03-13

## 2020-03-12 NOTE — TELEPHONE ENCOUNTER
Patient's spouse sent [employer] message asking about the coronavirus and her upcoming appointment; I called Ruperto per his request to discuss; I advised him that I will send a [employer] message with our standard coronavirus response, and he is fine with that; He had questions about a referral and billing; I advised him to call their insurance company and I provided him the phone number for [employer] billing; They are set to move on 4/17/20, and based on the CD19 results from the appointment on 3/25/20, if needed, they would like to get another Rituximab infusion before moving; Ruperto is also asking about getting the Bactrim refilled, just to be sure she doesn't run out; I will pend the prescription to Dr. Barragan for review and signature; I will also advise the patient sign a GABRIELA when she is here for her appointment in a couple weeks.    Sana Carcamo, MS RN Care Coordinator

## 2020-03-25 ENCOUNTER — VIRTUAL VISIT (OUTPATIENT)
Dept: NEUROLOGY | Facility: CLINIC | Age: 50
End: 2020-03-25
Attending: PSYCHIATRY & NEUROLOGY
Payer: COMMERCIAL

## 2020-03-25 DIAGNOSIS — G36.0 NEUROMYELITIS OPTICA SPECTRUM DISORDER (H): Primary | ICD-10-CM

## 2020-03-25 NOTE — PROGRESS NOTES
"Kerrie Patel is a 49 year old female who is being evaluated via a billable telephone visit.      The patient has been notified of following:     \"This telephone visit will be conducted via a call between you and your physician/provider. We have found that certain health care needs can be provided without the need for a physical exam.  This service lets us provide the care you need with a short phone conversation.  If a prescription is necessary we can send it directly to your pharmacy.  If lab work is needed we can place an order for that and you can then stop by our lab to have the test done at a later time.    If during the course of the call the physician/provider feels a telephone visit is not appropriate, you will not be charged for this service.\"     Kerrie Patel complains of:  Doing well until the past 3 days when she developed headaches an pain behind eyes  With blurry vision worsening as well, they last about an hour without treatment or CBD oil which makes her sleepy, the headache is accompanied by face swelling. She is not working since we provided the supporting letter.    Her Rituxamab infusion is scheduled for April but we will get CD 19 and if B cell still suppressed we will postpone the infusion on a month by month basis.  Chief Complaint   Patient presents with     Follow Up     Follow up MS       I have reviewed and updated the patient's Past Medical History, Social History, Family History and Medication List.    ALLERGIES  Amoxicillin; Lactose; and Methylprednisolone    Additional provider notes: The patient and her  are scheduled to move to [place] on 4-17 and will follow up with Dr. Ashok Deleon from the [place] [facility]    Assessment/Plan:  1. Neuromyelitis optica spectrum disorder (H)  Her Rituxamab infusion is scheduled for April but we will get CD 19 early April and if B cells are still suppressed we will postpone the infusion on a month by month basis. Continue " prednisone 10 mg every other day.    - CD19 B Cell Count; Future    Phone call duration: 20 minutes    Lea Barragan MD

## 2020-03-30 DIAGNOSIS — G36.0 NEUROMYELITIS OPTICA SPECTRUM DISORDER (H): ICD-10-CM

## 2020-03-30 PROCEDURE — 86355 B CELLS TOTAL COUNT: CPT | Mod: ZL | Performed by: PSYCHIATRY & NEUROLOGY

## 2020-03-30 PROCEDURE — 36415 COLL VENOUS BLD VENIPUNCTURE: CPT | Mod: ZL | Performed by: PSYCHIATRY & NEUROLOGY

## 2020-04-01 DIAGNOSIS — G36.0 NEUROMYELITIS OPTICA SPECTRUM DISORDER (H): ICD-10-CM

## 2020-04-01 PROCEDURE — 86355 B CELLS TOTAL COUNT: CPT | Mod: ZL | Performed by: PSYCHIATRY & NEUROLOGY

## 2020-04-01 PROCEDURE — 36415 COLL VENOUS BLD VENIPUNCTURE: CPT | Mod: ZL | Performed by: PSYCHIATRY & NEUROLOGY

## 2020-04-02 ENCOUNTER — MYC MEDICAL ADVICE (OUTPATIENT)
Dept: NEUROLOGY | Facility: CLINIC | Age: 50
End: 2020-04-02

## 2020-04-02 LAB
CD19 CELLS # BLD: <1 CELLS/UL (ref 107–698)
CD19 CELLS NFR BLD: <1 % (ref 6–27)

## 2020-04-02 NOTE — TELEPHONE ENCOUNTER
Patient noted Dr. Barragan's input regarding the lab results; I instructed the patient and spouse how to print out a release of information and send it in.    Sana Carcamo, MS RN Care Coordinator

## 2020-04-02 NOTE — TELEPHONE ENCOUNTER
Ruperto sent a [employer] message asking for me to give him a call; He told me that when he called the [facility] (phone [phone] fax [phone]), he was advised that the patient will need a referral to see Dr. Ashok Deleon; Ruperto is going to get the GABRIELA sent in to medical records as well; I called the [facility] and was advised that they would like the last office visit note and imaging sent along with the referral; Dr. Barragan, are you okay with me placing a referral order on your behalf? Thank you.    Sana Carcamo, MS RN Care Coordinator

## 2020-04-02 NOTE — TELEPHONE ENCOUNTER
I received the following message from Dr. Barragan:    Please let her know her B cells are still suppressed < 1 and there is no need for infusion in April, she  can repeat CD 19 count again in May.     [employer] message sent to patient advising this.    Sana Carcamo, MS RN Care Coordinator

## 2020-04-09 NOTE — TELEPHONE ENCOUNTER
Patient's spouse sent [employer] message asking about the GABRIELA; Questions answered and notified that Dr. Barragan talked with MS specialist in [place] and that they will be contacted to schedule.    Sana Carcamo, MS RN Care Coordinator

## 2020-05-11 NOTE — TELEPHONE ENCOUNTER
Patient sent MyChart message stating that they are having a hard time getting the patient's medical records from us over to the new clinic in [place]; I called our film room, and they are mailing an MRI disc out today to the referral clinic; I have faxed office visit notes, discharge summaries, lab results, MRI reports and OCT results accordingly; [email or web address] message sent to patient advising this; Dr. Barragan, would you like to check a CD19 count in the meantime, while they are trying to establish care? Thank you.    Sana Carcamo, MS RN Care Coordinator

## 2020-05-14 NOTE — TELEPHONE ENCOUNTER
I received the following response from Dr. Barragan:    No,     I spoke with Dr Deleon in [place], lack of records should not delay the visit with him.    I called the [facility], and had a chart created for the patient; I advised that an MRI disc was mailed out on Monday, as well as records faxed that day as well; [place] message sent to patient advising this and to contact the clinic to schedule.    Sana Carcamo, MS RN Care Coordinator

## 2020-05-18 NOTE — TELEPHONE ENCOUNTER
Ruperto sent MyChart message stating that the clinic still would not let Kerrie be scheduled with Dr. Deleon until Dr. Barragan talked with him again for the referral; Dr. Barragan has already spoken with Dr. Deleon directly; I believe what is needed is an official referral order; Dr. Barragan, I presume I can place a neurology referral order under a verbal order from you, correct? Thank you.    Sana Carcamo, MS RN Care Coordinator

## 2020-05-20 ENCOUNTER — VIRTUAL VISIT (OUTPATIENT)
Dept: NEUROLOGY | Facility: CLINIC | Age: 50
End: 2020-05-20
Attending: PSYCHIATRY & NEUROLOGY
Payer: COMMERCIAL

## 2020-05-20 DIAGNOSIS — G36.0 NEUROMYELITIS OPTICA SPECTRUM DISORDER (H): Primary | ICD-10-CM

## 2020-05-20 NOTE — PROGRESS NOTES
"Kerrie Patel is a 50 year old female who is being evaluated via a billable video visit.      The patient has been notified of following:     \"This video visit will be conducted via a call between you and your physician/provider. We have found that certain health care needs can be provided without the need for an in-person physical exam.  This service lets us provide the care you need with a video conversation.  If a prescription is necessary we can send it directly to your pharmacy.  If lab work is needed we can place an order for that and you can then stop by our lab to have the test done at a later time.    Video visits are billed at different rates depending on your insurance coverage.  Please reach out to your insurance provider with any questions.    If during the course of the call the physician/provider feels a video visit is not appropriate, you will not be charged for this service.\"    Patient has given verbal consent for Video visit? Yes    How would you like to obtain your AVS? [place]    Patient would like the video invitation sent by: Text to cell phone: [phone]    Will anyone else be joining your video visit? No        Video-Visit Details    Type of service:  Video Visit    Video Start Time: 1:35  Video End Time: 1:57    Originating Location (pt. Location): Home     Doximity video was used for this encounter    Distant Location (provider location):  [facility] MULTIPLE SCLEROSIS       THE [facility] MULTIPLE SCLEROSIS CLINIC  FOLLOW UP VISIT           PRINCIPAL NEUROLOGIC DIAGNOSIS: Neuromyelitis Optica SD + anti MOG        HISTORY OF ILLNESS:    This is a follow visit for this 50 year old . Who was last seen on  3-24.    Since last visit patient reports that she has not moved to [place] as originally planned, partly because they have been unable to sell their house and partly because they cannot get an appointment with Dr. Deleon whom I referred him to.  Apparently they called " the [facility] center and spoke to Linda told him that they needed a referral, they proceeded to tell her that I spoke with Dr. Deleon and he had agreed to see her but she still said an appointment could not be made at that time.  She also requested records which we have sent with the patient and her  are unclear if they have been received by the [facility].    Her last CD19 count was in early April and her B cells were still suppressed, at this point it will be important to repeat the test now and make sure she does not need the rituximab infusion before going to [place].    She reports that since last night she started experiencing some left upper extremity weakness which she thinks is similar to the symptoms she developed right before needing the past infusion.  She is also very anxious person that gets symptoms when she becomes stressed out which is currently the case.    I reassured her and her  that I will touch base with Dr. Deleon and see how he can help her get an appointment and we will proceed with infusion if her CD19 count shows B cell reconstitution      Current Outpatient Prescriptions:  Current Outpatient Medications   Medication     calcium carbonate (OS-LISA) 1500 (600 Ca) MG tablet     Cholecalciferol 5000 units TABS     omeprazole 20 MG tablet     PREDNISONE PO     Specialty Vitamins Products (VITAMINS FOR HAIR PO)     topiramate (TOPAMAX) 25 MG tablet     TURMERIC PO     vitamin E 400 units TABS     acetaminophen (TYLENOL) 500 MG tablet     albuterol (PROAIR HFA/PROVENTIL HFA/VENTOLIN HFA) 108 (90 Base) MCG/ACT inhaler     Dextromethorphan HBr 3 MG/ML suspension     diphenhydrAMINE (BENADRYL) 25 MG capsule     melatonin 1 MG TABS tablet     pantoprazole (PROTONIX) 40 MG EC tablet     predniSONE (DELTASONE) 50 MG tablet     sulfamethoxazole-trimethoprim (BACTRIM DS) 800-160 MG tablet     No current facility-administered medications for this visit.            ALLERGIES       Allergies   Allergen Reactions     Amoxicillin Nausea and Vomiting     Lactose      Other reaction(s): GI Bleeding     Methylprednisolone GI Disturbance     Pancreatitis when using high dose steroids, tolerates low dose steroids           REVIEW OF SYSTEMS:    Comprehensive review of systems otherwise was negative, including constitutional, head and neck, cardiovascular, pulmonary, gastrointestinal, endocrine, urologic, reproductive, rheumatic, hematologic, immunologic, dermatologic, and psychiatric.    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  Minimal symptoms of left upper extremity weakness and visual abnormalities likely related to stress and fatigue.    PLAN:    Plan is to obtain a CD 19 count as soon as possible and based on the results proceed to the rituximab infusion if needed.  I will also contact Dr. Deleon via email see how he can assist us in getting an appointment for her in the next 3 to 4 weeks.    They understand and agree with the plan, in the meantime she has been advised to rest and not worry and to call us to let us know what we should send the order for the blood work.    I spent 20 minutes in this visit, with >50% direct patient time spent counseling about prognosis, treatment options, and coordination of care.           Lea Barragan MD  Chief, Multiple Sclerosis Division  Department of Neurology  [facility]

## 2020-05-21 ENCOUNTER — TRANSFERRED RECORDS (OUTPATIENT)
Dept: HEALTH INFORMATION MANAGEMENT | Facility: CLINIC | Age: 50
End: 2020-05-21

## 2020-12-27 ENCOUNTER — HEALTH MAINTENANCE LETTER (OUTPATIENT)
Age: 50
End: 2020-12-27

## 2021-02-24 NOTE — NURSING NOTE
Bed: 03  Expected date:   Expected time:   Means of arrival: Self  Comments:   Patient Information     Patient Name MRN Sex Kerrie Sanderson [id number] Female 1970      Nursing Note by Zaynab Ventura at 2017 12:45 PM     Author:  Zaynab Ventura Service:  (none) Author Type:  NURS- Student Practical Nurse     Filed:  2017 12:56 PM Encounter Date:  2017 Status:  Signed     :  Zaynab Ventura (NURS- Student Practical Nurse)            This patient presents today for a Preoperative exam for this procedure: Hysterectomy   Date of Surgery: 2017   Surgeon:  Dr. Hassan  Facility:  [facility] LPN............................ 2017 12:49 PM

## 2021-03-06 ENCOUNTER — HEALTH MAINTENANCE LETTER (OUTPATIENT)
Age: 51
End: 2021-03-06

## 2021-04-25 ENCOUNTER — HEALTH MAINTENANCE LETTER (OUTPATIENT)
Age: 51
End: 2021-04-25

## 2021-10-09 ENCOUNTER — HEALTH MAINTENANCE LETTER (OUTPATIENT)
Age: 51
End: 2021-10-09

## 2022-01-31 NOTE — PROGRESS NOTES
"[facility], [place]   Neurology Daily Note  Kerrie Patel  [id number]  05/29/2019    Subjective: Reports that abdominal pain has improved. No nausea, no vomiting, no diarrhea. Tolerating diet and will advance to regular and she will try eating some toast. Right eye blurriness slightly improved, able to see blues and greens but not red.    Objective   /77 (BP Location: Left arm)   Pulse 62   Temp 96.3  F (35.7  C) (Oral)   Resp 18   Ht 1.55 m (5' 1.02\")   Wt 80.8 kg (178 lb 1.6 oz)   SpO2 96%   BMI 33.63 kg/m     General: Sitting in bed, appears comfortable.   HEENT: Normocephalic atraumatic   Cardiac: RRR  Chest: No respiratory distress  Abdomen: Soft, non-tender to palpation  Extremities: No bilateral lower extremity edema   Skin:  No lesions.   Psych:  Mood and affect stable.     Neuro:  Mental status: Alert, attentive, oriented. Follows commands. Speech fluent and comprehension intact.   Cranial nerves:  Eyes conjugate, relative afferent pupillary defect on the right, patient still unable to distinguish red but able to distinguish blue and green, VFF, no facial asymmetry, facial sensation intact, tongue midline, shoulder shrug and SCM strong  Motor: Tone normal. No atrophy. Subtle left pronator drift otherwise strength testing 5/5 throughout.   Reflexes: 2+ reflexes in biceps, brachioradialis and knees. Toes downgoing bilaterally.  Sensory: Intact to light touch throughout   Coordination: FNF intact  Gait: Not tested     Investigations    CMP   Recent Labs   Lab 05/28/19  0553 05/27/19  0453 05/26/19  0940 05/25/19  0604 05/24/19  0552  05/22/19  1357    138  --  140 140   < > 140   POTASSIUM 4.0 4.4  --  4.2 4.3   < > 4.1   CHLORIDE 105 105  --  106 106   < > 107   CO2 29 28  --  26 27   < > 28   ANIONGAP 8 5  --  7 7   < > 6   GLC 87 148*  --  175* 167*   < > 128*   BUN 15 27  --  28 24   < > 15   CR 0.67 0.68  --  0.69 0.62   < > 0.63   GFRESTIMATED >90 >90  --  " >90 >90   < > >90   GFRESTBLACK >90 >90  --  >90 >90   < > >90   LISA 8.4* 8.1*  --  8.6 8.7   < > 9.1   MAG  --   --   --   --   --   --  2.6*   PROTTOTAL 5.6* 4.7* 5.4*  --   --   --  6.5*   ALBUMIN 3.7 3.5 4.1  --   --   --  3.5   BILITOTAL 0.6 0.6 0.7  --   --   --  0.2   ALKPHOS 36* 14* 14*  --   --   --  59   AST 15 6 6  --   --   --  13   ALT 21 17 16  --   --   --  31    < > = values in this interval not displayed.      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  Exam is notable for a right afferent pupillary defect, reduced right visual acuity, and a left pronator drift in the LUE.  She was admitted as a direct admit for five rounds of PLEX and high dose steroids. She has had prior complications of pancreatitis and hallucinations with Solu-Medrol 1000 mg daily and developed panreatitis again during this hospitalization. Therefore Solu-Medrol was discontinued on 5/26.      #MOG-opathy   #Recurrent right optic neuritis  #Mild left-sided weakness  - Discontinued solu-medrol on 5/26 due to pancreatitis, she has had a previous history of pancreatitis following IV pulsed steroids.   - Started on prednisone 20mg daily given that she has been on long-term oral steroids prior to admission to prevent adrenal insufficiency   - GI prophylaxis with pantoprazole while on steroids  - Continue PCP prophylaxis with Bactrim MWF  - Plasma exchange 4/5 today with daily INR and  fibrinogen, appreciate transfusion medicine assistance, last round of PLEX on 5/31  - Ophthalmology following, appreciate assistance  - Tylenol for headaches  - Zofran and Compazine for nausea  - Continue vitamin D 5000 units daily  - Continue calcium carbonate 500 mg daily  - S/p MRI C and T spine with and without contrast, stable and no new lesions     #Pancreatitis  Likely 2/2 the high dose steroids. Lipase significantly increased with abdominal pain. Abdominal pain, nausea and diarrhea improved today.   - U/S abdomen on 5/28 showed no evidence of biliary obstruction.   - ADAT, advanced to regular today  - Monitor CBC, BMP and Lipase  - Medicine team recommending GI outpatient follow up at discharge  - Tramadol 50 mg every 6 hours PRN for pain     #Leukocytosis, improving   Suspected secondary to high dose steroids, down-trended after discontinuing IV steroids. Will monitor for signs and symptoms of infection.   -If fever, will obtain UA, blood cultures, and CT A/P w/IV contrast    #Insomnia    - Melatonin 3 mg nightly     FEN: Regular diet   PPx: SCDs, ambulation   Code: Full      Patient was seen and discussed with Dr. Lord.    Christi Domingo  Neurology Resident, PGY2  Pager: [phone]    Attending physician: I saw and evaluated the patient with the resident team and I agree with the findings and plan of care as per Dr. Domingo above.    This morning the patient reports some subjective improvement in vision in the right eye in that she has been able to distinguish green and blue colors, which is encouraging. Plan is for plasma exchange #4 of planned 5 today.    Abdominal pain is better and she is tolerating advancement of diet.    Disposition: Likely discharge after last plasma exchange 5/31/2019.    Robbin Mazariegos MD   of Neurlogy     [Joint Pain] : joint pain [Negative] : Heme/Lymph [FreeTextEntry9] : bilateral knee pain, left hip pain

## 2022-03-26 ENCOUNTER — HEALTH MAINTENANCE LETTER (OUTPATIENT)
Age: 52
End: 2022-03-26

## 2022-05-21 ENCOUNTER — HEALTH MAINTENANCE LETTER (OUTPATIENT)
Age: 52
End: 2022-05-21

## 2022-09-17 ENCOUNTER — HEALTH MAINTENANCE LETTER (OUTPATIENT)
Age: 52
End: 2022-09-17

## 2023-05-06 ENCOUNTER — HEALTH MAINTENANCE LETTER (OUTPATIENT)
Age: 53
End: 2023-05-06

## 2023-06-04 ENCOUNTER — HEALTH MAINTENANCE LETTER (OUTPATIENT)
Age: 53
End: 2023-06-04

## 2023-07-21 NOTE — PROGRESS NOTES
Detail Level: Detailed Outpatient Occupational Therapy Discharge Note     Patient: Kerrie Patel  : 1970    Beginning/End Dates of Reporting Period:  2019 to 2019    Referring Provider: Dr. Dali Diaz    Therapy Diagnosis: Decreased independence with self-cares, IADLs, and work tasks.    Client Self Report: Kerrie has PT first this date.  No new conerns at this time.  Kerrie does report that she was able to keep a  on her dogs leash while walking for about 15 minutes yesterday for the first time since her diagnosis.      Objective Measurements:  Pain Measure   4/15: No pain today and no numbness.       Strength (pounds)    3/15/19 4/16/19   Right 26 44   Left 21 43       Goals:      Goal Identifier STG 1   Goal Description Patient will report increased functional independence during showering from current rating of 3/10 to 7/10 or higher on PSFS.(Goal Met- 4/15: Patient reports 10/10)   Target Date 19   Date Met   04/15/19   Progress: Goal Met.  Patient reports that she has returned to baseline with functional independence for showering.       Goal Identifier STG 2   Goal Description Patient will report decreased difficulty using a knife to cut food from current rating of Moderate Difficulty to Mild-No Difficulty as measured by QuickDASH.(Goal Met- 4/15: Mild difficulty)   Target Date 19   Date Met   04/15/19   Progress: Goal Met.  Patient reports mild difficulty cutting food with knife.  She reports difficulty is more from decreased depth perception than BUE dysfunction.       Goal Identifier STG 3   Goal Description Patient will increase bilateral  strength by 5 lbs to increase functional use of BUE.   Target Date 19   Date Met   04/15/19   Progress: Goal Met.  She has increased bilateral  strength by 18 lbs on RUE and 22 lbs on LUE.       Goal Identifier LTG 1   Goal Description Patient will increase overall functional independence as measured by scoring 25% impairment or  less on the QuickDASH.   Target Date 05/10/19   Date Met      Progress: Goal Not Met.  QuickDASH not re-administered prior to discharge.       Goal Identifier LTG 2   Goal Description Patient will report increased independence in meal preparation, specifically chopping vegetables from current rating of 4/10 to 7/10 or higher on PSFS.   Target Date 05/10/19   Date Met      Progress: Goal Not Met.  PSFS not re-administered prior to discharge.      Goal Identifier LTG 3   Goal Description Patient will report return to work without increased pain or decreased functional abilities for completing household tasks.     Target Date 05/14/19   Date Met      Progress: Goal Not Met.  Patient discharged prior to return to work.       Goal Identifier LTG 4   Goal Description Patient will complete informal community mobility assessment in order to make safe determination about attempting to return to driving.    Target Date 05/14/19   Date Met      Progress: Goal Not Met.  Patient discharged prior to administration of informal community mobility screen.       Progress Toward Goals:   Progress this reporting period: Patient has made good progress towards occupational therapy goals during course of therapy.  She increased her overall strength and endurance.  She increased her functional independence with self-cares and household tasks.  She chose to discontinue occupational therapy appointments upon return visit with referring provider.      Plan:  Discharge from therapy.    Discharge:    Reason for Discharge: Patient has failed to schedule further appointments.    Equipment Issued: HEP    Discharge Plan: Patient to continue home program.   Detail Level: Generalized Detail Level: Zone

## 2024-07-23 NOTE — PLAN OF CARE
"0700-1930: AVSS on RA, intermittent bradycardia at rest. \"Gas bubble\" discomfort managed adequately with simethicone x1. Otherwise denies pain or nausea. Abdominal ultrasound completed, see results review. Pt went to eye clinic today, pupils currently remain dilated. R cheek rash resolved today. Able to tolerate clear liquids today, advanced to full liquid for dinner. Up independently. Neurology team to work with eye clinic to reschedule appointment on 05/31. PLEX scheduled for 1230 tomorrow, to be done at bedside. Continue with POC.  " Sent letter

## 2024-10-03 NOTE — PROGRESS NOTES
Outpatient Physical Therapy Discharge Note     Patient: Kerrie Patel  : 1970    Beginning/End Dates of Reporting Period:  3/15/19 to 10/24/2019    Referring Provider: Jetter Diaz    Therapy Diagnosis: impaired mobility     Client Self Report: Kerrie reports going for a mile walk yesterday and took a half hour break and then vacuumed the house. Noticed her legs feel a little more shakey but denies much soreness. She is impressed with her improved stamina.    Objective Measurements:        Outcome Measures (most recent score):      Goals:  Goal Identifier strength   Goal Description Pt will have improved strength by at least 1 muscle grade for ability to negotiate stairs and stand for showering at least 10 minutes.   Target Date 05/10/19   Date Met      Progress:     Goal Identifier balance   Goal Description Pt will have improved score on mCTSIB by at least 10 seconds for improved stability during transitions.   Target Date 05/10/19   Date Met      Progress:     Goal Identifier HEP   Goal Description Pt will be independent and consistent with performance of a customized home exercise program for self management of symptoms.   Target Date 19   Date Met      Progress:       Progress Toward Goals:   Not assessed this period.    Plan:  Discharge from therapy.    Discharge:    Reason for Discharge: Patient chooses to discontinue therapy.  Patient has failed to schedule further appointments.    Equipment Issued: n/a    Discharge Plan: Patient to continue home program.   Detail Level: Detailed Show Aperture Variable?: Yes Consent: Verbal consent was obtained including but not limited to risks of crusting, scabbing, blistering, scarring, darker or lighter pigmentary change, recurrence, incomplete removal and infection. Number Of Freeze-Thaw Cycles: 2 freeze-thaw cycles Post-Care Instructions: I reviewed with the patient in detail post-care instructions. Patient is to wear sunprotection, and avoid picking at any of the treated lesions. Pt may apply Aquaphor to crusted or scabbing areas. Duration Of Freeze Thaw-Cycle (Seconds): 3 Render Note In Bullet Format When Appropriate: No

## (undated) RX ORDER — TETRACAINE HYDROCHLORIDE 5 MG/ML
SOLUTION OPHTHALMIC
Status: DISPENSED
Start: 2019-01-18

## (undated) RX ORDER — SODIUM CHLORIDE 9 MG/ML
INJECTION, SOLUTION INTRAVENOUS
Status: DISPENSED
Start: 2019-01-23

## (undated) RX ORDER — SODIUM CHLORIDE 9 MG/ML
INJECTION, SOLUTION INTRAVENOUS
Status: DISPENSED
Start: 2019-02-11

## (undated) RX ORDER — METHYLPREDNISOLONE SODIUM SUCCINATE 125 MG/2ML
INJECTION, POWDER, LYOPHILIZED, FOR SOLUTION INTRAMUSCULAR; INTRAVENOUS
Status: DISPENSED
Start: 2019-01-18

## (undated) RX ORDER — ONDANSETRON 2 MG/ML
INJECTION INTRAMUSCULAR; INTRAVENOUS
Status: DISPENSED
Start: 2019-02-11

## (undated) RX ORDER — METOCLOPRAMIDE HYDROCHLORIDE 5 MG/ML
INJECTION INTRAMUSCULAR; INTRAVENOUS
Status: DISPENSED
Start: 2019-02-11

## (undated) RX ORDER — KETOROLAC TROMETHAMINE 30 MG/ML
INJECTION, SOLUTION INTRAMUSCULAR; INTRAVENOUS
Status: DISPENSED
Start: 2019-02-11

## (undated) RX ORDER — METOCLOPRAMIDE HYDROCHLORIDE 5 MG/ML
INJECTION INTRAMUSCULAR; INTRAVENOUS
Status: DISPENSED
Start: 2019-01-18

## (undated) RX ORDER — KETOROLAC TROMETHAMINE 30 MG/ML
INJECTION, SOLUTION INTRAMUSCULAR; INTRAVENOUS
Status: DISPENSED
Start: 2019-01-23

## (undated) RX ORDER — ONDANSETRON 2 MG/ML
INJECTION INTRAMUSCULAR; INTRAVENOUS
Status: DISPENSED
Start: 2019-01-23

## (undated) RX ORDER — DIPHENHYDRAMINE HYDROCHLORIDE 50 MG/ML
INJECTION INTRAMUSCULAR; INTRAVENOUS
Status: DISPENSED
Start: 2019-01-18

## (undated) RX ORDER — DIPHENHYDRAMINE HYDROCHLORIDE 50 MG/ML
INJECTION INTRAMUSCULAR; INTRAVENOUS
Status: DISPENSED
Start: 2019-02-11

## (undated) RX ORDER — ONDANSETRON 4 MG/1
TABLET, ORALLY DISINTEGRATING ORAL
Status: DISPENSED
Start: 2019-01-23

## (undated) RX ORDER — LIDOCAINE HYDROCHLORIDE 10 MG/ML
INJECTION, SOLUTION EPIDURAL; INFILTRATION; INTRACAUDAL; PERINEURAL
Status: DISPENSED
Start: 2019-05-22